# Patient Record
Sex: MALE | Race: BLACK OR AFRICAN AMERICAN | Employment: FULL TIME | URBAN - METROPOLITAN AREA
[De-identification: names, ages, dates, MRNs, and addresses within clinical notes are randomized per-mention and may not be internally consistent; named-entity substitution may affect disease eponyms.]

---

## 2017-03-30 DIAGNOSIS — B00.9 HSV-1 INFECTION: ICD-10-CM

## 2017-03-30 DIAGNOSIS — E55.9 VITAMIN D DEFICIENCY: ICD-10-CM

## 2017-03-30 RX ORDER — VALACYCLOVIR HYDROCHLORIDE 500 MG/1
500 TABLET, FILM COATED ORAL 2 TIMES DAILY
Qty: 30 TAB | Refills: 1 | Status: CANCELLED | OUTPATIENT
Start: 2017-03-30

## 2017-03-30 RX ORDER — ERGOCALCIFEROL 1.25 MG/1
50000 CAPSULE ORAL
Qty: 12 CAP | Refills: 2 | Status: CANCELLED | OUTPATIENT
Start: 2017-03-30

## 2017-03-30 NOTE — TELEPHONE ENCOUNTER
Requested Prescriptions     Pending Prescriptions Disp Refills    ergocalciferol (ERGOCALCIFEROL) 50,000 unit capsule 12 Cap 2     Sig: Take 1 Cap by mouth every seven (7) days.  valACYclovir (VALTREX) 500 mg tablet 30 Tab 1     Sig: Take 1 Tab by mouth two (2) times a day. As needed during outbreaks.    Last Filled 1/27/16  Last office visit was  9/06/2016  Next office visit is     None schedule     Please assist.

## 2017-03-30 NOTE — TELEPHONE ENCOUNTER
Please call and notify pt that no further refills will be granted unless he makes a f/u appointment.

## 2017-06-01 LAB
25(OH)D3 SERPL-MCNC: 26.3 NG/ML (ref 32–100)
A-G RATIO,AGRAT: 1.7 RATIO (ref 1.1–2.6)
ABSOLUTE LYMPHOCYTE COUNT, 10803: 1.8 K/UL (ref 1–4.8)
ALBUMIN SERPL-MCNC: 4.6 G/DL (ref 3.5–5)
ALP SERPL-CCNC: 64 U/L (ref 25–115)
ALT SERPL-CCNC: 35 U/L (ref 5–40)
ANION GAP SERPL CALC-SCNC: 15 MMOL/L
AST SERPL W P-5'-P-CCNC: 21 U/L (ref 10–37)
BASOPHILS # BLD: 0 K/UL (ref 0–0.2)
BASOPHILS NFR BLD: 1 % (ref 0–2)
BILIRUB SERPL-MCNC: 0.2 MG/DL (ref 0.2–1.2)
BUN SERPL-MCNC: 10 MG/DL (ref 6–22)
CALCIUM SERPL-MCNC: 9.5 MG/DL (ref 8.4–10.4)
CHLORIDE SERPL-SCNC: 99 MMOL/L (ref 98–110)
CHOLEST SERPL-MCNC: 170 MG/DL (ref 110–200)
CO2 SERPL-SCNC: 29 MMOL/L (ref 20–32)
CREAT SERPL-MCNC: 1 MG/DL (ref 0.5–1.2)
EOSINOPHIL # BLD: 0.1 K/UL (ref 0–0.5)
EOSINOPHIL NFR BLD: 3 % (ref 0–6)
ERYTHROCYTE [DISTWIDTH] IN BLOOD BY AUTOMATED COUNT: 14.2 % (ref 10–16)
GFRAA, 66117: >60
GFRNA, 66118: >60
GLOBULIN,GLOB: 2.7 G/DL (ref 2–4)
GLUCOSE SERPL-MCNC: 115 MG/DL (ref 65–99)
GRANULOCYTES,GRANS: 45 % (ref 40–75)
HCT VFR BLD AUTO: 44.7 % (ref 36.6–51.9)
HDLC SERPL-MCNC: 47 MG/DL (ref 40–59)
HGB BLD-MCNC: 14.1 G/DL (ref 13.2–17.3)
LDLC SERPL CALC-MCNC: 106 MG/DL (ref 50–99)
LYMPHOCYTES, LYMLT: 46 % (ref 27–45)
MCH RBC QN AUTO: 31 PG (ref 26–34)
MCHC RBC AUTO-ENTMCNC: 32 G/DL (ref 32–36)
MCV RBC AUTO: 97 FL (ref 80–95)
MONOCYTES # BLD: 0.2 K/UL (ref 0.1–0.9)
MONOCYTES NFR BLD: 5 % (ref 3–9)
NEUTROPHILS # BLD AUTO: 1.8 K/UL (ref 1.8–7.7)
PLATELET # BLD AUTO: 216 K/UL (ref 140–440)
PMV BLD AUTO: 9.7 FL (ref 6–10.8)
POTASSIUM SERPL-SCNC: 4.3 MMOL/L (ref 3.5–5.5)
PROT SERPL-MCNC: 7.3 G/DL (ref 6.4–8.3)
RBC # BLD AUTO: 4.61 M/UL (ref 3.8–5.8)
SODIUM SERPL-SCNC: 143 MMOL/L (ref 133–145)
T4 FREE SERPL-MCNC: 1 NG/DL (ref 0.9–1.8)
TRIGL SERPL-MCNC: 83 MG/DL (ref 40–149)
TSH SERPL DL<=0.005 MIU/L-ACNC: 1.88 MCU/ML (ref 0.27–4.2)
VLDLC SERPL CALC-MCNC: 17 MG/DL (ref 8–30)
WBC # BLD AUTO: 3.9 K/UL (ref 4–11)

## 2017-06-05 ENCOUNTER — OFFICE VISIT (OUTPATIENT)
Dept: INTERNAL MEDICINE CLINIC | Age: 32
End: 2017-06-05

## 2017-06-05 VITALS
WEIGHT: 228.6 LBS | SYSTOLIC BLOOD PRESSURE: 110 MMHG | TEMPERATURE: 98.3 F | HEART RATE: 69 BPM | BODY MASS INDEX: 34.65 KG/M2 | HEIGHT: 68 IN | RESPIRATION RATE: 18 BRPM | DIASTOLIC BLOOD PRESSURE: 78 MMHG | OXYGEN SATURATION: 98 %

## 2017-06-05 DIAGNOSIS — E55.9 VITAMIN D DEFICIENCY: ICD-10-CM

## 2017-06-05 DIAGNOSIS — E66.9 OBESITY (BMI 30.0-34.9): ICD-10-CM

## 2017-06-05 DIAGNOSIS — G47.33 OBSTRUCTIVE SLEEP APNEA: ICD-10-CM

## 2017-06-05 DIAGNOSIS — B00.9 HSV-1 INFECTION: ICD-10-CM

## 2017-06-05 DIAGNOSIS — Z79.899 ENCOUNTER FOR LONG-TERM (CURRENT) USE OF MEDICATIONS: ICD-10-CM

## 2017-06-05 DIAGNOSIS — E78.5 DYSLIPIDEMIA: Primary | ICD-10-CM

## 2017-06-05 RX ORDER — ERGOCALCIFEROL 1.25 MG/1
50000 CAPSULE ORAL
Qty: 12 CAP | Refills: 5 | Status: SHIPPED | OUTPATIENT
Start: 2017-06-05 | End: 2017-11-21 | Stop reason: SDUPTHER

## 2017-06-05 RX ORDER — VALACYCLOVIR HYDROCHLORIDE 500 MG/1
500 TABLET, FILM COATED ORAL 2 TIMES DAILY
Qty: 30 TAB | Refills: 1 | Status: SHIPPED | OUTPATIENT
Start: 2017-06-05 | End: 2018-10-23

## 2017-06-05 NOTE — PROGRESS NOTES
Chief Complaint   Patient presents with    Insomnia   Patient is her today for F/U for Insomnia and snoring. Pt sts that he has a CPAP machine and is now sleeping better. Pt also sts that he has right hand pain in the fifth digit finger. 1. Have you been to the ER, urgent care clinic since your last visit? Hospitalized since your last visit? Yes In [facility] Stomach virus. 2. Have you seen or consulted any other health care providers outside of the [address] [place] [address] since your last visit? Include any pap smears or colon screening.  No

## 2017-06-05 NOTE — PATIENT INSTRUCTIONS
Snoring: Care Instructions  Your Care Instructions  Snoring is a noise that you may make while breathing during sleep. You snore when the flow of air from your mouth or nose to your lungs makes the tissues of your throat vibrate while you sleep. This usually is caused by a blockage or narrowing in your nose, mouth, or throat (airway). Snoring can be soft, loud, raspy, harsh, hoarse, or fluttering. Your bed partner may notice that you sleep with your mouth open and that you are restless while sleeping. If snoring interferes with your or your bed partner's sleep, either or both of you may feel tired during the day. You may be able to help reduce your snoring by making changes in your activities and in the way you sleep. Follow-up care is a key part of your treatment and safety. Be sure to make and go to all appointments, and call your doctor if you are having problems. It's also a good idea to know your test results and keep a list of the medicines you take. How can you care for yourself at home? · Lose weight, if needed. Many people who snore are overweight. Weight loss can help reduce the narrowing of the airway and might reduce or stop snoring. · Limit the use of alcohol and medicines. Drinking a lot of alcohol or taking certain medicines, especially sleeping pills or tranquilizers, before sleep may make snoring worse. · Go to bed at the same time each night, and get plenty of sleep. You may snore more when you have not had enough sleep. · Sleep on your side. Sleeping on your side may stop snoring. Try sewing a pocket in the middle of the back of your [employer] top, putting a tennis ball into the pocket, and stitching it closed. This will help keep you from sleeping on your back. · Treat breathing problems. Breathing problems caused by colds or allergies can disturb airflow. This can lead to snoring. · Use a device that helps keep your airway open during sleep.  This could be a device that you put in your mouth. Other examples include strips or disks that you use on your nose. · Do not smoke. Smoking can make snoring worse. If you need help quitting, talk to your doctor about stop-smoking programs and medicines. These can increase your chances of quitting for good. · Raise the head of your bed 4 to 6 inches by putting bricks under the legs of the bed. This may prevent your tongue from falling toward the back of the throat, which can make a blocked or narrow airway worse. Putting pillows under your head will not help. When should you call for help? Watch closely for changes in your health, and be sure to contact your doctor if:  · You snore, and you feel sleepy during the day. · Your sleeping partner or you notice that you gasp, choke, or stop breathing during sleep. · You do not get better as expected. Where can you learn more? Go to http://paulina-cale.info/. Enter V090 in the search box to learn more about \"Snoring: Care Instructions. \"  Current as of: May 23, 2016  Content Version: 11.2  © 5514-7177 [employer]. Care instructions adapted under license by [employer] (which disclaims liability or warranty for this information). If you have questions about a medical condition or this instruction, always ask your healthcare professional. [address] any warranty or liability for your use of this information. Hyperlipidemia: After Your Visit  Your Care Instructions  Hyperlipidemia is too much fat in your blood. The body has several kinds of fat, including cholesterol and triglycerides. Your body needs fat for many things, such as making new cells. But too much fat in your blood increases your chances of having a heart attack or stroke. You may be able to lower your cholesterol and triglycerides with a heart-healthy diet, exercise, and if needed, medicine.  Your doctor may want you to try lifestyle changes first to see whether they lower the fat in your blood. You may need to take medicine if lifestyle changes do not lower the fat in your blood enough. Follow-up care is a key part of your treatment and safety. Be sure to make and go to all appointments, and call your doctor if you are having problems. Its also a good idea to know your test results and keep a list of the medicines you take. How can you care for yourself at home? Take your medicines  · Take your medicines exactly as prescribed. Call your doctor if you think you are having a problem with your medicine. · If you take medicine to lower your cholesterol, go to follow-up visits. You will need to have blood tests. · Do not take large doses of niacin, which is a B vitamin, while taking medicine called statins. It may increase the chance of muscle pain and liver problems. · Talk to your doctor about avoiding grapefruit juice if you are taking statins. Grapefruit juice can raise the level of this medicine in your blood. This could increase side effects. Eat more fruits, vegetables, and fiber  · Fruits and vegetables have lots of nutrients that help protect against heart disease, and they have little--if any--fat. Try to eat at least five servings a day. Dark green, deep orange, or yellow fruits and vegetables are healthy choices. · Keep carrots, celery, and other veggies handy for snacks. Buy fruit that is in season and store it where you can see it so that you will be tempted to eat it. Cook dishes that have a lot of veggies in them, such as stir-fries and soups. · Foods high in fiber may reduce your cholesterol and provide important vitamins and minerals. High-fiber foods include whole-grain cereals and breads, oatmeal, beans, brown rice, citrus fruits, and apples. · Buy whole-grain breads and cereals instead of white bread and pastries. Limit saturated fat  · Read food labels and try to avoid saturated fat and trans fat. They increase your risk of heart disease.   · Use olive or canola oil when you cook. Try cholesterol-lowering spreads, such as Benecol or Take Control. · Bake, broil, grill, or steam foods instead of frying them. · Limit the amount of high-fat meats you eat, including hot dogs and sausages. Cut out all visible fat when you prepare meat. · Eat fish, skinless poultry, and soy products such as tofu instead of high-fat meats. Soybeans may be especially good for your heart. Eat at least two servings of fish a week. Certain fish, such as salmon, contain omega-3 fatty acids, which may help reduce your risk of heart attack. · Choose low-fat or fat-free milk and dairy products. Get exercise, limit alcohol, and quit smoking  · Get more exercise. Work with your doctor to set up an exercise program. Even if you can do only a small amount, exercise will help you get stronger, have more energy, and manage your weight and your stress. Walking is an easy way to get exercise. Gradually increase the amount you walk every day. Aim for at least 30 minutes on most days of the week. You also may want to swim, bike, or do other activities. · Limit alcohol to no more than 2 drinks a day for men and 1 drink a day for women. · Do not smoke. If you need help quitting, talk to your doctor about stop-smoking programs and medicines. These can increase your chances of quitting for good. When should you call for help? Call 911 anytime you think you may need emergency care. For example, call if:  · You have symptoms of a heart attack. These may include:  ¨ Chest pain or pressure, or a strange feeling in the chest.  ¨ Sweating. ¨ Shortness of breath. ¨ Nausea or vomiting. ¨ Pain, pressure, or a strange feeling in the back, neck, jaw, or upper belly or in one or both shoulders or arms. ¨ Lightheadedness or sudden weakness. ¨ A fast or irregular heartbeat. After you call 911, the  may tell you to chew 1 adult-strength or 2 to 4 low-dose aspirin. Wait for an ambulance.  Do not try to drive yourself. · You have signs of a stroke. These may include:  ¨ Sudden numbness, paralysis, or weakness in your face, arm, or leg, especially on only one side of your body. ¨ New problems with walking or balance. ¨ Sudden vision changes. ¨ Drooling or slurred speech. ¨ New problems speaking or understanding simple statements, or feeling confused. ¨ A sudden, severe headache that is different from past headaches. · You passed out (lost consciousness). Call your doctor now or seek immediate medical care if:  · You have muscle pain or weakness. Watch closely for changes in your health, and be sure to contact your doctor if:  · You are very tired. · You have an upset stomach, gas, constipation, or belly pain or cramps. Where can you learn more? Go to [employer].be  Enter C406 in the search box to learn more about \"Hyperlipidemia: After Your Visit. \"   © 5968-0751 [employer]. Care instructions adapted under license by [employer] (which disclaims liability or warranty for this information). This care instruction is for use with your licensed healthcare professional. If you have questions about a medical condition or this instruction, always ask your healthcare professional. [place] [address] any warranty or liability for your use of this information. Content Version: 2.2.487059; Last Revised: October 13, 2011                 Starting a Weight Loss Plan: Care Instructions  Your Care Instructions  If you are thinking about losing weight, it can be hard to know where to start. Your doctor can help you set up a weight loss plan that best meets your needs. You may want to take a class on nutrition or exercise, or join a weight loss support group. If you have questions about how to make changes to your eating or exercise habits, ask your doctor about seeing a registered dietitian or an exercise specialist.  It can be a big challenge to lose weight.  But you do not have to make huge changes at once. Make small changes, and stick with them. When those changes become habit, add a few more changes. If you do not think you are ready to make changes right now, try to pick a date in the future. Make an appointment to see your doctor to discuss whether the time is right for you to start a plan. Follow-up care is a key part of your treatment and safety. Be sure to make and go to all appointments, and call your doctor if you are having problems. Its also a good idea to know your test results and keep a list of the medicines you take. How can you care for yourself at home? · Set realistic goals. Many people expect to lose much more weight than is likely. A weight loss of 5% to 10% of your body weight may be enough to improve your health. · Get family and friends involved to provide support. Talk to them about why you are trying to lose weight, and ask them to help. They can help by participating in exercise and having meals with you, even if they may be eating something different. · Find what works best for you. If you do not have time or do not like to cook, a program that offers meal replacement bars or shakes may be better for you. Or if you like to prepare meals, finding a plan that includes daily menus and recipes may be best.  · Ask your doctor about other health professionals who can help you achieve your weight loss goals. ¨ A dietitian can help you make healthy changes in your diet. ¨ An exercise specialist or  can help you develop a safe and effective exercise program.  ¨ A counselor or psychiatrist can help you cope with issues such as depression, anxiety, or family problems that can make it hard to focus on weight loss. · Consider joining a support group for people who are trying to lose weight. Your doctor can suggest groups in your area. Where can you learn more? Go to http://paulina-cale.info/.   Enter M379 in the search box to learn more about \"Starting a Weight Loss Plan: Care Instructions. \"  Current as of: October 13, 2016  Content Version: 11.2  © 3139-8935 [employer]. Care instructions adapted under license by [employer] (which disclaims liability or warranty for this information). If you have questions about a medical condition or this instruction, always ask your healthcare professional. [place] [address] any warranty or liability for your use of this information.

## 2017-06-05 NOTE — PROGRESS NOTES
Chief Complaint   Patient presents with    F/U Vit D Def and Dyslipidemia         HPI:  Patient is a 36year old  male with medical problems listed below presents today for follow up of Dyslipidemia and Vit D def. He has been feeling well and voices no complaints today. He is complaint with his medications with no adverse effects reported. He has obstructive sleep apnea and endorsed that sleep has been improved with CPAP use when sleeping. He has been eating more and not exercising and has gained 14 pounds in the last year. He is requesting refill of Valtrex that he takes for Herpetic infection. Past Medical History:   Diagnosis Date    Condyloma acuminatum     Environmental allergies     Groin pain     Plantar fasciitis     Tinea pedis        Allergies   Allergen Reactions    Penicillins Rash, Swelling and Hives    Mold Extracts Rash    Peanut Rash and Swelling       Current Outpatient Prescriptions   Medication Sig Dispense Refill    traZODone (DESYREL) 50 mg tablet Take 1 Tab by mouth nightly. 30 Tab 2    naproxen (NAPROSYN) 500 mg tablet Take 1 Tab by mouth two (2) times daily (with meals). 28 Tab 1    ergocalciferol (ERGOCALCIFEROL) 50,000 unit capsule Take 1 Cap by mouth every seven (7) days. 12 Cap 2    valACYclovir (VALTREX) 500 mg tablet Take 1 Tab by mouth two (2) times a day. As needed during outbreaks. 30 Tab 1    clotrimazole (LOTRIMIN) 1 % topical cream Apply  to affected area two (2) times a day. 15 g 3    diphenhydrAMINE (BENADRYL) 25 mg capsule Take 25 mg by mouth every six (6) hours as needed.  Indications: INSOMNIA            ROS:  Constitutional: Negative for fever, chills, or fatigue  Neurological: Negative for headache, dizziness, visual disturbance, or loss of consciousness  Respiratory: Negative for SOB, hemoptysis, or wheezing  Cardiovascular: Negative for chest pain, palpitation, or leg swelling  Gastrointestinal: Negative for abdominal pain, nausea, vomiting, diarrhea, blood in stool, melena, or heartburn  Musculoskeletal: Negative for falls        Physical Exam:  Visit Vitals    /78 (BP 1 Location: Left arm, BP Patient Position: Sitting)    Pulse 69    Temp 98.3 °F (36.8 °C) (Oral)    Resp 18    Ht 5' 8\" (1.727 m)    Wt 228 lb 9.6 oz (103.7 kg)    SpO2 98%    BMI 34.76 kg/m2     General: a & o x 3, afebrile, well-nourished, interacting appropriately, in no acute distress  Skin: warm and dry, no rashes , no bruises  Neck: supple, symmetrical, no thyromegaly  Respiratory: symmetrical chest expansion, lung sounds clear bilaterally, good air entry, good respiratory effort, no wheezes or crackles  Cardiovascular: normal S1S2, regular rate and rhythm, no murmurs, pulses palpable, no thrill, no carotid or abdominal bruits, no peripheral edema, no JVD  Abdomen: non-distended, normoactive bowel sounds x 4 quadrants, soft, non-tender to palpation  Musculoskeletal: normal ROM on all joints, no swelling or deformity, no perilumbar tenderness, steady gait      Assessment/Plan:    ICD-10-CM ICD-9-CM    1. Dyslipidemia E78.5 272.4 Recent lipid panel done 5/31/17 reviewed with pt and revealed Cho 170 and  - he was counseled on low fat diet. LIPID PANEL   2. Vitamin D deficiency E55.9 268.9 Recent Vit D is low at 26.3  ergocalciferol (ERGOCALCIFEROL) 50,000 unit capsule Q week sent to pharmacy. VITAMIN D, 25 HYDROXY   3. Obesity (BMI 30.0-34. 9) E66.9 278.00 I have reviewed/discussed the above normal BMI with the patient. I have recommended the following interventions: dietary management education, guidance, and counseling, encourage exercise and monitor weight . .     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 Encounter for long-term (current) use of medications Z79.899 V58.69 CBC WITH AUTOMATED DIFF      HEMOGLOBIN A1C W/O EAG      METABOLIC PANEL, COMPREHENSIVE      T4, FREE      TSH 3RD GENERATION   6.  Obstructive sleep apnea G47.33 327.23 He was advised to continue use of CPAP when sleeping. Orders Placed This Encounter    CBC WITH AUTOMATED DIFF     Standing Status:   Future     Standing Expiration Date:   1/1/2018    HEMOGLOBIN A1C W/O EAG     Standing Status:   Future     Standing Expiration Date:   6/6/2018    LIPID PANEL     Standing Status:   Future     Standing Expiration Date:   5/7/5087    METABOLIC PANEL, COMPREHENSIVE     Standing Status:   Future     Standing Expiration Date:   1/1/2018    T4, FREE     Standing Status:   Future     Standing Expiration Date:   1/1/2018    TSH 3RD GENERATION     Standing Status:   Future     Standing Expiration Date:   1/1/2018    VITAMIN D, 25 HYDROXY     Standing Status:   Future     Standing Expiration Date:   12/8/2017    valACYclovir (VALTREX) 500 mg tablet     Sig: Take 1 Tab by mouth two (2) times a day. As needed during outbreaks. Dispense:  30 Tab     Refill:  1    ergocalciferol (ERGOCALCIFEROL) 50,000 unit capsule     Sig: Take 1 Cap by mouth every seven (7) days. Dispense:  12 Cap     Refill:  5         Recent labs reviewed with pt      Additional Notes: Discussed today's diagnosis, treatment plans. Discussed medication indications and side effects. After Visit Summary: Discussed provided printed patient instructions. Answered questions accordingly. Follow-up Disposition:  In 6 months with labs 1 week prior        Angela Strong DO, MPH  Internal Medicine

## 2017-06-05 NOTE — MR AVS SNAPSHOT
Visit Information Date & Time Provider Department Dept. Phone Encounter #  
 6/5/2017  7:45  Nisa Strong DO [facility]ists at [facility]  Follow-up Instructions Return in about 6 months (around 12/5/2017) for Labs 1 week before. Upcoming Health Maintenance Date Due DTaP/Tdap/Td series (1 - Tdap) 9/2/2014 INFLUENZA AGE 9 TO ADULT 8/1/2017 Allergies as of 6/5/2017  Review Complete On: 6/5/2017 By: Fritz Wilks LPN Severity Noted Reaction Type Reactions Penicillins High 02/13/2017    Rash, Swelling, Hives Mold Extracts  01/16/2015    Rash Peanut  01/16/2015    Rash, Swelling Current Immunizations  Never Reviewed Name Date Influenza Vaccine 9/1/2015 Td 9/1/2014 Not reviewed this visit You Were Diagnosed With   
  
 Codes Comments Obesity (BMI 30.0-34.9)    -  Primary ICD-10-CM: W45.2 ICD-9-CM: 278.00 HSV-1 infection     ICD-10-CM: B00.9 ICD-9-CM: 054.9 Vitamin D deficiency     ICD-10-CM: E55.9 ICD-9-CM: 268.9 Dyslipidemia     ICD-10-CM: E78.5 ICD-9-CM: 272.4 Encounter for long-term (current) use of medications     ICD-10-CM: Z79.899 ICD-9-CM: V58.69 Vitals BP Pulse Temp Resp Height(growth percentile) Weight(growth percentile) 110/78 (BP 1 Location: Left arm, BP Patient Position: Sitting) 69 98.3 °F (36.8 °C) (Oral) 18 5' 8\" (1.727 m) 228 lb 9.6 oz (103.7 kg) SpO2 BMI Smoking Status 98% 34.76 kg/m2 Never Smoker Vitals History BMI and BSA Data Body Mass Index Body Surface Area 34.76 kg/m 2 2.23 m 2 Preferred Pharmacy Pharmacy Name Phone [id number] [phone] Your Updated Medication List  
  
   
This list is accurate as of: 6/5/17  8:21 AM.  Always use your most recent med list.  
  
  
  
  
 clotrimazole 1 % topical cream  
Commonly known as:  Carmita Vicente  
 Apply  to affected area two (2) times a day. diphenhydrAMINE 25 mg capsule Commonly known as:  BENADRYL Take 25 mg by mouth every six (6) hours as needed. Indications: INSOMNIA  
  
 ergocalciferol 50,000 unit capsule Commonly known as:  ERGOCALCIFEROL Take 1 Cap by mouth every seven (7) days. naproxen 500 mg tablet Commonly known as:  NAPROSYN Take 1 Tab by mouth two (2) times daily (with meals). traZODone 50 mg tablet Commonly known as:  Mordecai Amado Take 1 Tab by mouth nightly. valACYclovir 500 mg tablet Commonly known as:  VALTREX Take 1 Tab by mouth two (2) times a day. As needed during outbreaks. Prescriptions Sent to Pharmacy Refills  
 valACYclovir (VALTREX) 500 mg tablet 1 Sig: Take 1 Tab by mouth two (2) times a day. As needed during outbreaks. Class: Normal  
 Pharmacy: [address] [place] [address] Ph #: [phone] Route: Oral  
 ergocalciferol (ERGOCALCIFEROL) 50,000 unit capsule 5 Sig: Take 1 Cap by mouth every seven (7) days. Class: Normal  
 Pharmacy: [address] [place] [address] Ph #: [phone] Route: Oral  
  
Follow-up Instructions Return in about 6 months (around 12/5/2017) for Labs 1 week before. To-Do List   
 12/02/2017 Lab:  CBC WITH AUTOMATED DIFF   
  
 12/02/2017 Lab:  HEMOGLOBIN A1C W/O EAG   
  
 12/02/2017 Lab:  LIPID PANEL   
  
 12/02/2017 Lab:  METABOLIC PANEL, COMPREHENSIVE   
  
 12/02/2017 Lab:  T4, FREE   
  
 12/02/2017 Lab:  TSH 3RD GENERATION   
  
 12/05/2017 Lab:  VITAMIN D, 25 HYDROXY Patient Instructions Snoring: Care Instructions Your Care Instructions Snoring is a noise that you may make while breathing during sleep. You snore when the flow of air from your mouth or nose to your lungs makes the tissues of your throat vibrate while you sleep.  This usually is caused by a blockage or narrowing in your nose, mouth, or throat (airway). Snoring can be soft, loud, raspy, harsh, hoarse, or fluttering. Your bed partner may notice that you sleep with your mouth open and that you are restless while sleeping. If snoring interferes with your or your bed partner's sleep, either or both of you may feel tired during the day. You may be able to help reduce your snoring by making changes in your activities and in the way you sleep. Follow-up care is a key part of your treatment and safety. Be sure to make and go to all appointments, and call your doctor if you are having problems. It's also a good idea to know your test results and keep a list of the medicines you take. How can you care for yourself at home? · Lose weight, if needed. Many people who snore are overweight. Weight loss can help reduce the narrowing of the airway and might reduce or stop snoring. · Limit the use of alcohol and medicines. Drinking a lot of alcohol or taking certain medicines, especially sleeping pills or tranquilizers, before sleep may make snoring worse. · Go to bed at the same time each night, and get plenty of sleep. You may snore more when you have not had enough sleep. · Sleep on your side. Sleeping on your side may stop snoring. Try sewing a pocket in the middle of the back of your pajama top, putting a tennis ball into the pocket, and stitching it closed. This will help keep you from sleeping on your back. · Treat breathing problems. Breathing problems caused by colds or allergies can disturb airflow. This can lead to snoring. · Use a device that helps keep your airway open during sleep. This could be a device that you put in your mouth. Other examples include strips or disks that you use on your nose. · Do not smoke. Smoking can make snoring worse. If you need help quitting, talk to your doctor about stop-smoking programs and medicines. These can increase your chances of quitting for good. · Raise the head of your bed 4 to 6 inches by putting bricks under the legs of the bed. This may prevent your tongue from falling toward the back of the throat, which can make a blocked or narrow airway worse. Putting pillows under your head will not help. When should you call for help? Watch closely for changes in your health, and be sure to contact your doctor if: 
· You snore, and you feel sleepy during the day. · Your sleeping partner or you notice that you gasp, choke, or stop breathing during sleep. · You do not get better as expected. Where can you learn more? Go to http://paulina-cale.info/. Enter J570 in the search box to learn more about \"Snoring: Care Instructions. \" Current as of: May 23, 2016 Content Version: 11.2 © 9664-4138 [employer]. Care instructions adapted under license by [employer] (which disclaims liability or warranty for this information). If you have questions about a medical condition or this instruction, always ask your healthcare professional. [place] [address] any warranty or liability for your use of this information. Hyperlipidemia: After Your Visit Your Care Instructions Hyperlipidemia is too much fat in your blood. The body has several kinds of fat, including cholesterol and triglycerides. Your body needs fat for many things, such as making new cells. But too much fat in your blood increases your chances of having a heart attack or stroke. You may be able to lower your cholesterol and triglycerides with a heart-healthy diet, exercise, and if needed, medicine. Your doctor may want you to try lifestyle changes first to see whether they lower the fat in your blood. You may need to take medicine if lifestyle changes do not lower the fat in your blood enough. Follow-up care is a key part of your treatment and safety.  Be sure to make and go to all appointments, and call your doctor if you are having problems. Its also a good idea to know your test results and keep a list of the medicines you take. How can you care for yourself at home? Take your medicines · Take your medicines exactly as prescribed. Call your doctor if you think you are having a problem with your medicine. · If you take medicine to lower your cholesterol, go to follow-up visits. You will need to have blood tests. · Do not take large doses of niacin, which is a B vitamin, while taking medicine called statins. It may increase the chance of muscle pain and liver problems. · Talk to your doctor about avoiding grapefruit juice if you are taking statins. Grapefruit juice can raise the level of this medicine in your blood. This could increase side effects. Eat more fruits, vegetables, and fiber · Fruits and vegetables have lots of nutrients that help protect against heart disease, and they have littleif anyfat. Try to eat at least five servings a day. Dark green, deep orange, or yellow fruits and vegetables are healthy choices. · Keep carrots, celery, and other veggies handy for snacks. Buy fruit that is in season and store it where you can see it so that you will be tempted to eat it. Cook dishes that have a lot of veggies in them, such as stir-fries and soups. · Foods high in fiber may reduce your cholesterol and provide important vitamins and minerals. High-fiber foods include whole-grain cereals and breads, oatmeal, beans, brown rice, citrus fruits, and apples. · Buy whole-grain breads and cereals instead of white bread and pastries. Limit saturated fat · Read food labels and try to avoid saturated fat and trans fat. They increase your risk of heart disease. · Use olive or canola oil when you cook. Try cholesterol-lowering spreads, such as Benecol or Take Control. · Bake, broil, grill, or steam foods instead of frying them.  
· Limit the amount of high-fat meats you eat, including hot dogs and sausages. Cut out all visible fat when you prepare meat. · Eat fish, skinless poultry, and soy products such as tofu instead of high-fat meats. Soybeans may be especially good for your heart. Eat at least two servings of fish a week. Certain fish, such as salmon, contain omega-3 fatty acids, which may help reduce your risk of heart attack. · Choose low-fat or fat-free milk and dairy products. Get exercise, limit alcohol, and quit smoking · Get more exercise. Work with your doctor to set up an exercise program. Even if you can do only a small amount, exercise will help you get stronger, have more energy, and manage your weight and your stress. Walking is an easy way to get exercise. Gradually increase the amount you walk every day. Aim for at least 30 minutes on most days of the week. You also may want to swim, bike, or do other activities. · Limit alcohol to no more than 2 drinks a day for men and 1 drink a day for women. · Do not smoke. If you need help quitting, talk to your doctor about stop-smoking programs and medicines. These can increase your chances of quitting for good. When should you call for help? Call 911 anytime you think you may need emergency care. For example, call if: 
· You have symptoms of a heart attack. These may include: ¨ Chest pain or pressure, or a strange feeling in the chest. 
¨ Sweating. ¨ Shortness of breath. ¨ Nausea or vomiting. ¨ Pain, pressure, or a strange feeling in the back, neck, jaw, or upper belly or in one or both shoulders or arms. ¨ Lightheadedness or sudden weakness. ¨ A fast or irregular heartbeat. After you call 911, the  may tell you to chew 1 adult-strength or 2 to 4 low-dose aspirin. Wait for an ambulance. Do not try to drive yourself. · You have signs of a stroke. These may include: 
¨ Sudden numbness, paralysis, or weakness in your face, arm, or leg, especially on only one side of your body. ¨ New problems with walking or balance. ¨ Sudden vision changes. ¨ Drooling or slurred speech. ¨ New problems speaking or understanding simple statements, or feeling confused. ¨ A sudden, severe headache that is different from past headaches. · You passed out (lost consciousness). Call your doctor now or seek immediate medical care if: 
· You have muscle pain or weakness. Watch closely for changes in your health, and be sure to contact your doctor if: 
· You are very tired. · You have an upset stomach, gas, constipation, or belly pain or cramps. Where can you learn more? Go to Level[employer]be Enter C406 in the search box to learn more about \"Hyperlipidemia: After Your Visit. \"  
© 7330-5790 [employer]. Care instructions adapted under license by [employer] (which disclaims liability or warranty for this information). This care instruction is for use with your licensed healthcare professional. If you have questions about a medical condition or this instruction, always ask your healthcare professional. [place] [address] any warranty or liability for your use of this information. Content Version: 1.2.288544; Last Revised: October 13, 2011 Starting a Weight Loss Plan: Care Instructions Your Care Instructions If you are thinking about losing weight, it can be hard to know where to start. Your doctor can help you set up a weight loss plan that best meets your needs. You may want to take a class on nutrition or exercise, or join a weight loss support group. If you have questions about how to make changes to your eating or exercise habits, ask your doctor about seeing a registered dietitian or an exercise specialist. 
It can be a big challenge to lose weight. But you do not have to make huge changes at once. Make small changes, and stick with them. When those changes become habit, add a few more changes.  
If you do not think you are ready to make changes right now, try to pick a date in the future. Make an appointment to see your doctor to discuss whether the time is right for you to start a plan. Follow-up care is a key part of your treatment and safety. Be sure to make and go to all appointments, and call your doctor if you are having problems. Its also a good idea to know your test results and keep a list of the medicines you take. How can you care for yourself at home? · Set realistic goals. Many people expect to lose much more weight than is likely. A weight loss of 5% to 10% of your body weight may be enough to improve your health. · Get family and friends involved to provide support. Talk to them about why you are trying to lose weight, and ask them to help. They can help by participating in exercise and having meals with you, even if they may be eating something different. · Find what works best for you. If you do not have time or do not like to cook, a program that offers meal replacement bars or shakes may be better for you. Or if you like to prepare meals, finding a plan that includes daily menus and recipes may be best. 
· Ask your doctor about other health professionals who can help you achieve your weight loss goals. ¨ A dietitian can help you make healthy changes in your diet. ¨ An exercise specialist or  can help you develop a safe and effective exercise program. 
¨ A counselor or psychiatrist can help you cope with issues such as depression, anxiety, or family problems that can make it hard to focus on weight loss. · Consider joining a support group for people who are trying to lose weight. Your doctor can suggest groups in your area. Where can you learn more? Go to http://paulina-cale.info/. Enter I176 in the search box to learn more about \"Starting a Weight Loss Plan: Care Instructions. \" Current as of: October 13, 2016 Content Version: 11.2 © 5286-0366 [employer].  Care instructions adapted under license by [address] (which disclaims liability or warranty for this information). If you have questions about a medical condition or this instruction, always ask your healthcare professional. [address] any warranty or liability for your use of this information. Introducing [facility]! Juan Baltazar introduces [employer] patient portal. Now you can access parts of your medical record, email your doctor's office, and request medication refills online. 1. In your internet browser, go to https://[employer]. [employer] 2. Click on the First Time User? Click Here link in the Sign In box. You will see the New Member Sign Up page. 3. Enter your [employer] Access Code exactly as it appears below. You will not need to use this code after youve completed the sign-up process. If you do not sign up before the expiration date, you must request a new code. · [employer] Access Code: [email or web address] Expires: 9/3/2017  8:20 AM 
 
4. Enter the last four digits of your Social Security Number (xxxx) and Date of Birth (mm/dd/yyyy) as indicated and click Submit. You will be taken to the next sign-up page. 5. Create a [employer]pe ID. This will be your [employer] login ID and cannot be changed, so think of one that is secure and easy to remember. 6. Create a [employer] password. You can change your password at any time. 7. Enter your Password Reset Question and Answer. This can be used at a later time if you forget your password. 8. Enter your e-mail address. You will receive e-mail notification when new information is available in [address] 9. Click Sign Up. You can now view and download portions of your medical record. 10. Click the Download Summary menu link to download a portable copy of your medical information. If you have questions, please visit the Frequently Asked Questions section of the [employer]pe website.  Remember, [employer] is NOT to be used for urgent needs. For medical emergencies, dial 911. Now available from your iPhone and Android! Please provide this summary of care documentation to your next provider. Your primary care clinician is listed as Florin Corrales. If you have any questions after today's visit, please call [phone].

## 2017-11-21 DIAGNOSIS — E55.9 VITAMIN D DEFICIENCY: ICD-10-CM

## 2017-11-22 RX ORDER — ERGOCALCIFEROL 1.25 MG/1
50000 CAPSULE ORAL
Qty: 12 CAP | Refills: 3 | Status: SHIPPED | OUTPATIENT
Start: 2017-11-22 | End: 2019-01-07 | Stop reason: SDUPTHER

## 2017-12-02 DIAGNOSIS — E78.5 DYSLIPIDEMIA: ICD-10-CM

## 2017-12-02 DIAGNOSIS — Z79.899 ENCOUNTER FOR LONG-TERM (CURRENT) USE OF MEDICATIONS: ICD-10-CM

## 2017-12-05 DIAGNOSIS — E55.9 VITAMIN D DEFICIENCY: ICD-10-CM

## 2018-08-15 ENCOUNTER — OFFICE VISIT (OUTPATIENT)
Dept: FAMILY MEDICINE CLINIC | Age: 33
End: 2018-08-15

## 2018-08-15 VITALS
BODY MASS INDEX: 36.43 KG/M2 | RESPIRATION RATE: 18 BRPM | OXYGEN SATURATION: 97 % | TEMPERATURE: 98.5 F | HEART RATE: 79 BPM | WEIGHT: 240.4 LBS | SYSTOLIC BLOOD PRESSURE: 121 MMHG | DIASTOLIC BLOOD PRESSURE: 77 MMHG | HEIGHT: 68 IN

## 2018-08-15 DIAGNOSIS — R51.9 HEADACHE, UNSPECIFIED HEADACHE TYPE: ICD-10-CM

## 2018-08-15 DIAGNOSIS — R11.0 NAUSEA: Primary | ICD-10-CM

## 2018-08-15 DIAGNOSIS — E78.5 DYSLIPIDEMIA: ICD-10-CM

## 2018-08-15 DIAGNOSIS — Z79.899 ENCOUNTER FOR LONG-TERM (CURRENT) USE OF MEDICATIONS: ICD-10-CM

## 2018-08-15 DIAGNOSIS — E55.9 VITAMIN D DEFICIENCY: ICD-10-CM

## 2018-08-15 DIAGNOSIS — E66.9 OBESITY (BMI 30.0-34.9): ICD-10-CM

## 2018-08-15 RX ORDER — BUTALBITAL, ACETAMINOPHEN, CAFFEINE AND CODEINE PHOSPHATE 50; 325; 40; 30 MG/1; MG/1; MG/1; MG/1
1 CAPSULE ORAL
Qty: 30 CAP | Refills: 0 | Status: SHIPPED | OUTPATIENT
Start: 2018-08-15 | End: 2018-10-23

## 2018-08-15 RX ORDER — ONDANSETRON 4 MG/1
4 TABLET, ORALLY DISINTEGRATING ORAL
Qty: 30 TAB | Refills: 0 | Status: SHIPPED | OUTPATIENT
Start: 2018-08-15 | End: 2018-10-23

## 2018-08-15 NOTE — PATIENT INSTRUCTIONS
Learning About the [facility] Diet  What is the Mediterranean diet? The Mediterranean diet is a style of eating rather than a diet plan. It features foods eaten in [place], [place], [place], and other countries along the [facility]. It emphasizes eating foods like fish, fruits, vegetables, beans, high-fiber breads and whole grains, nuts, and olive oil. This style of eating includes limited red meat, cheese, and sweets. Why choose the Mediterranean diet? A Mediterranean-style diet may improve heart health. It contains more fat than other heart-healthy diets. But the fats are mainly from nuts, unsaturated oils (such as fish oils and olive oil), and certain nut or seed oils (such as canola, soybean, or flaxseed oil). These fats may help protect the heart and blood vessels. How can you get started on the Mediterranean diet? Here are some things you can do to switch to a more Mediterranean way of eating. What to eat  · Eat a variety of fruits and vegetables each day, such as grapes, blueberries, tomatoes, broccoli, peppers, figs, olives, spinach, eggplant, beans, lentils, and chickpeas. · Eat a variety of whole-grain foods each day, such as oats, brown rice, and whole wheat bread, pasta, and couscous. · Eat fish at least 2 times a week. Try tuna, salmon, mackerel, lake trout, herring, or sardines. · Eat moderate amounts of low-fat dairy products, such as milk, cheese, or yogurt. · Eat moderate amounts of poultry and eggs. · Choose healthy (unsaturated) fats, such as nuts, olive oil, and certain nut or seed oils like canola, soybean, and flaxseed. · Limit unhealthy (saturated) fats, such as butter, palm oil, and coconut oil. And limit fats found in animal products, such as meat and dairy products made with whole milk. Try to eat red meat only a few times a month in very small amounts. · Limit sweets and desserts to only a few times a week.  This includes sugar-sweetened drinks like soda. The Mediterranean diet may also include red wine with your meal-1 glass each day for women and up to 2 glasses a day for men. Tips for eating at home  · Use herbs, spices, garlic, lemon zest, and citrus juice instead of salt to add flavor to foods. · Add avocado slices to your sandwich instead of barnett. · Have fish for lunch or dinner instead of red meat. Brush the fish with olive oil, and broil or grill it. · Sprinkle your salad with seeds or nuts instead of cheese. · Cook with olive or canola oil instead of butter or oils that are high in saturated fat. · Switch from 2% milk or whole milk to 1% or fat-free milk. · Dip raw vegetables in a vinaigrette dressing or hummus instead of dips made from mayonnaise or sour cream.  · Have a piece of fruit for dessert instead of a piece of cake. Try baked apples, or have some dried fruit. Tips for eating out  · Try broiled, grilled, baked, or poached fish instead of having it fried or breaded. · Ask your  to have your meals prepared with olive oil instead of butter. · Order dishes made with marinara sauce or sauces made from olive oil. Avoid sauces made from cream or mayonnaise. · Choose whole-grain breads, whole wheat pasta and pizza crust, brown rice, beans, and lentils. · Cut back on butter or margarine on bread. Instead, you can dip your bread in a small amount of olive oil. · Ask for a side salad or grilled vegetables instead of french fries or chips. Where can you learn more? Go to http://paulina-cale.info/. Enter [phone] in the search box to learn more about \"Learning About the Mediterranean Diet. \"  Current as of: May 12, 2017  Content Version: 11.7  © 2048-1462 [employer]. Care instructions adapted under license by [employer] (which disclaims liability or warranty for this information).  If you have questions about a medical condition or this instruction, always ask your healthcare professional. [employer], [facility] disclaims any warranty or liability for your use of this information. Hyperlipidemia: After Your Visit  Your Care Instructions  Hyperlipidemia is too much fat in your blood. The body has several kinds of fat, including cholesterol and triglycerides. Your body needs fat for many things, such as making new cells. But too much fat in your blood increases your chances of having a heart attack or stroke. You may be able to lower your cholesterol and triglycerides with a heart-healthy diet, exercise, and if needed, medicine. Your doctor may want you to try lifestyle changes first to see whether they lower the fat in your blood. You may need to take medicine if lifestyle changes do not lower the fat in your blood enough. Follow-up care is a key part of your treatment and safety. Be sure to make and go to all appointments, and call your doctor if you are having problems. Its also a good idea to know your test results and keep a list of the medicines you take. How can you care for yourself at home? Take your medicines  · Take your medicines exactly as prescribed. Call your doctor if you think you are having a problem with your medicine. · If you take medicine to lower your cholesterol, go to follow-up visits. You will need to have blood tests. · Do not take large doses of niacin, which is a B vitamin, while taking medicine called statins. It may increase the chance of muscle pain and liver problems. · Talk to your doctor about avoiding grapefruit juice if you are taking statins. Grapefruit juice can raise the level of this medicine in your blood. This could increase side effects. Eat more fruits, vegetables, and fiber  · Fruits and vegetables have lots of nutrients that help protect against heart disease, and they have littleif anyfat. Try to eat at least five servings a day. Dark green, deep orange, or yellow fruits and vegetables are healthy choices.   · Keep carrots, celery, and other veggies handy for snacks. Buy fruit that is in season and store it where you can see it so that you will be tempted to eat it. Cook dishes that have a lot of veggies in them, such as stir-fries and soups. · Foods high in fiber may reduce your cholesterol and provide important vitamins and minerals. High-fiber foods include whole-grain cereals and breads, oatmeal, beans, brown rice, citrus fruits, and apples. · Buy whole-grain breads and cereals instead of white bread and pastries. Limit saturated fat  · Read food labels and try to avoid saturated fat and trans fat. They increase your risk of heart disease. · Use olive or canola oil when you cook. Try cholesterol-lowering spreads, such as Benecol or Take Control. · Bake, broil, grill, or steam foods instead of frying them. · Limit the amount of high-fat meats you eat, including hot dogs and sausages. Cut out all visible fat when you prepare meat. · Eat fish, skinless poultry, and soy products such as tofu instead of high-fat meats. Soybeans may be especially good for your heart. Eat at least two servings of fish a week. Certain fish, such as salmon, contain omega-3 fatty acids, which may help reduce your risk of heart attack. · Choose low-fat or fat-free milk and dairy products. Get exercise, limit alcohol, and quit smoking  · Get more exercise. Work with your doctor to set up an exercise program. Even if you can do only a small amount, exercise will help you get stronger, have more energy, and manage your weight and your stress. Walking is an easy way to get exercise. Gradually increase the amount you walk every day. Aim for at least 30 minutes on most days of the week. You also may want to swim, bike, or do other activities. · Limit alcohol to no more than 2 drinks a day for men and 1 drink a day for women. · Do not smoke. If you need help quitting, talk to your doctor about stop-smoking programs and medicines.  These can increase your chances of quitting for good. When should you call for help? Call 911 anytime you think you may need emergency care. For example, call if:  · You have symptoms of a heart attack. These may include:  ¨ Chest pain or pressure, or a strange feeling in the chest.  ¨ Sweating. ¨ Shortness of breath. ¨ Nausea or vomiting. ¨ Pain, pressure, or a strange feeling in the back, neck, jaw, or upper belly or in one or both shoulders or arms. ¨ Lightheadedness or sudden weakness. ¨ A fast or irregular heartbeat. After you call 911, the  may tell you to chew 1 adult-strength or 2 to 4 low-dose aspirin. Wait for an ambulance. Do not try to drive yourself. · You have signs of a stroke. These may include:  ¨ Sudden numbness, paralysis, or weakness in your face, arm, or leg, especially on only one side of your body. ¨ New problems with walking or balance. ¨ Sudden vision changes. ¨ Drooling or slurred speech. ¨ New problems speaking or understanding simple statements, or feeling confused. ¨ A sudden, severe headache that is different from past headaches. · You passed out (lost consciousness). Call your doctor now or seek immediate medical care if:  · You have muscle pain or weakness. Watch closely for changes in your health, and be sure to contact your doctor if:  · You are very tired. · You have an upset stomach, gas, constipation, or belly pain or cramps. Where can you learn more? Go to Chairish.be  Enter C406 in the search box to learn more about \"Hyperlipidemia: After Your Visit. \"   © 4186-9147 [employer]. Care instructions adapted under license by [employer] (which disclaims liability or warranty for this information).  This care instruction is for use with your licensed healthcare professional. If you have questions about a medical condition or this instruction, always ask your healthcare professional. [place] [address] any warranty or liability for your use of this information.   Content Version: 0.1.218482; Last Revised: October 13, 2011

## 2018-08-15 NOTE — MR AVS SNAPSHOT
[facility] 
 
 
 [address], [place] [address] Berta Cornelius [address] 
[phone] Patient: Micheal Vail MRN: [id number] DYN:[id number] Visit Information Date & Time Provider Department Dept. Phone Encounter #  
 8/15/2018 10:30  [address] [phone] [id number] Follow-up Instructions Return in about 1 week (around 8/22/2018) for to review labs. Upcoming Health Maintenance Date Due DTaP/Tdap/Td series (1 - Tdap) 9/2/2014 Influenza Age 5 to Adult 8/1/2018 Allergies as of 8/15/2018  Review Complete On: 8/15/2018 By: Stanislaw Cruz Severity Noted Reaction Type Reactions Penicillins High 02/13/2017    Rash, Swelling, Hives Mold Extracts  01/16/2015    Rash Peanut  01/16/2015    Rash, Swelling Current Immunizations  Never Reviewed Name Date Influenza Vaccine 9/1/2015 Td 9/1/2014 Not reviewed this visit You Were Diagnosed With   
  
 Codes Comments Nausea    -  Primary ICD-10-CM: R11.0 ICD-9-CM: 787.02 Vitamin D deficiency     ICD-10-CM: E55.9 ICD-9-CM: 268.9 Dyslipidemia     ICD-10-CM: E78.5 ICD-9-CM: 272.4 Obesity (BMI 30.0-34.9)     ICD-10-CM: U31.6 ICD-9-CM: 278.00 Headache, unspecified headache type     ICD-10-CM: R51 ICD-9-CM: 784.0 Encounter for long-term (current) use of medications     ICD-10-CM: Z79.899 ICD-9-CM: V58.69 Vitals BP Pulse Temp Resp Height(growth percentile) Weight(growth percentile) 121/77 79 98.5 °F (36.9 °C) (Oral) 18 5' 8\" (1.727 m) 240 lb 6.4 oz (109 kg) SpO2 BMI Smoking Status 97% 36.55 kg/m2 Never Smoker BMI and BSA Data Body Mass Index Body Surface Area  
 36.55 kg/m 2 2.29 m 2 Preferred Pharmacy Pharmacy Name Phone [phone] [facility] [place] [address] [phone] Your Updated Medication List  
  
   
 This list is accurate as of 8/15/18 11:18 AM.  Always use your most recent med list.  
  
  
  
  
 clotrimazole 1 % topical cream  
Commonly known as:  Adrianne Cash Apply  to affected area two (2) times a day. codeine-butalbital-acetaminophen-caffeine -86-30 mg capsule Commonly known as:  FIORICET WITH CODEINE Take 1 Cap by mouth every six (6) hours as needed for Headache. Max Daily Amount: 4 Caps. diphenhydrAMINE 25 mg capsule Commonly known as:  BENADRYL Take 25 mg by mouth every six (6) hours as needed. Indications: INSOMNIA  
  
 ergocalciferol 50,000 unit capsule Commonly known as:  ERGOCALCIFEROL Take 1 Cap by mouth every seven (7) days. naproxen 500 mg tablet Commonly known as:  NAPROSYN Take 1 Tab by mouth two (2) times daily (with meals). ondansetron 4 mg disintegrating tablet Commonly known as:  ZOFRAN ODT Take 1 Tab by mouth every eight (8) hours as needed for Nausea. traZODone 50 mg tablet Commonly known as:  Vincent Oh Take 1 Tab by mouth nightly. valACYclovir 500 mg tablet Commonly known as:  VALTREX Take 1 Tab by mouth two (2) times a day. As needed during outbreaks. Prescriptions Printed Refills  
 codeine-butalbital-acetaminophen-caffeine (FIORICET WITH CODEINE) -77-30 mg capsule 0 Sig: Take 1 Cap by mouth every six (6) hours as needed for Headache. Max Daily Amount: 4 Caps. Class: Print Route: Oral  
  
Prescriptions Sent to Pharmacy Refills  
 ondansetron (ZOFRAN ODT) 4 mg disintegrating tablet 0 Sig: Take 1 Tab by mouth every eight (8) hours as needed for Nausea. Class: Normal  
 Pharmacy: [address] [place] [address] #: [phone] Route: Oral  
  
Follow-up Instructions Return in about 1 week (around 8/22/2018) for to review labs. To-Do List   
 08/15/2018 Lab:  CBC WITH AUTOMATED DIFF   
  
 08/15/2018   Lab:  LIPID PANEL   
  
 08/15/2018 Lab:  METABOLIC PANEL, COMPREHENSIVE   
  
 08/15/2018 Lab:  TSH 3RD GENERATION   
  
 08/15/2018 Lab:  VITAMIN D, 25 HYDROXY   
  
 11/13/2018 Lab:  HEMOGLOBIN A1C W/O EAG Patient Instructions Learning About the [address] Diet What is the Mediterranean diet? The Mediterranean diet is a style of eating rather than a diet plan. It features foods eaten in [place], [place], [place] and [place], and other countries along the [facility]. It emphasizes eating foods like fish, fruits, vegetables, beans, high-fiber breads and whole grains, nuts, and olive oil. This style of eating includes limited red meat, cheese, and sweets. Why choose the Mediterranean diet? A Mediterranean-style diet may improve heart health. It contains more fat than other heart-healthy diets. But the fats are mainly from nuts, unsaturated oils (such as fish oils and olive oil), and certain nut or seed oils (such as canola, soybean, or flaxseed oil). These fats may help protect the heart and blood vessels. How can you get started on the Mediterranean diet? Here are some things you can do to switch to a more Mediterranean way of eating. What to eat · Eat a variety of fruits and vegetables each day, such as grapes, blueberries, tomatoes, broccoli, peppers, figs, olives, spinach, eggplant, beans, lentils, and chickpeas. · Eat a variety of whole-grain foods each day, such as oats, brown rice, and whole wheat bread, pasta, and couscous. · Eat fish at least 2 times a week. Try tuna, salmon, mackerel, lake trout, herring, or sardines. · Eat moderate amounts of low-fat dairy products, such as milk, cheese, or yogurt. · Eat moderate amounts of poultry and eggs. · Choose healthy (unsaturated) fats, such as nuts, olive oil, and certain nut or seed oils like canola, soybean, and flaxseed.  
· Limit unhealthy (saturated) fats, such as butter, palm oil, and coconut oil. And limit fats found in animal products, such as meat and dairy products made with whole milk. Try to eat red meat only a few times a month in very small amounts. · Limit sweets and desserts to only a few times a week. This includes sugar-sweetened drinks like soda. The Mediterranean diet may also include red wine with your meal-1 glass each day for women and up to 2 glasses a day for men. Tips for eating at home · Use herbs, spices, garlic, lemon zest, and citrus juice instead of salt to add flavor to foods. · Add avocado slices to your sandwich instead of barnett. · Have fish for lunch or dinner instead of red meat. Brush the fish with olive oil, and broil or grill it. · Sprinkle your salad with seeds or nuts instead of cheese. · Cook with olive or canola oil instead of butter or oils that are high in saturated fat. · Switch from 2% milk or whole milk to 1% or fat-free milk. · Dip raw vegetables in a vinaigrette dressing or hummus instead of dips made from mayonnaise or sour cream. 
· Have a piece of fruit for dessert instead of a piece of cake. Try baked apples, or have some dried fruit. Tips for eating out · Try broiled, grilled, baked, or poached fish instead of having it fried or breaded. · Ask your  to have your meals prepared with olive oil instead of butter. · Order dishes made with marinara sauce or sauces made from olive oil. Avoid sauces made from cream or mayonnaise. · Choose whole-grain breads, whole wheat pasta and pizza crust, brown rice, beans, and lentils. · Cut back on butter or margarine on bread. Instead, you can dip your bread in a small amount of olive oil. · Ask for a side salad or grilled vegetables instead of french fries or chips. Where can you learn more? Go to http://paulina-cale.info/. Enter 103-670-3117 in the search box to learn more about \"Learning About the Mediterranean Diet. \" Current as of: May 12, 2017 Content Version: 11.7 © 3004-7891 Healthwise, [employer]. Care instructions adapted under license by [employer] (which disclaims liability or warranty for this information). If you have questions about a medical condition or this instruction, always ask your healthcare professional. [address] any warranty or liability for your use of this information. Hyperlipidemia: After Your Visit Your Care Instructions Hyperlipidemia is too much fat in your blood. The body has several kinds of fat, including cholesterol and triglycerides. Your body needs fat for many things, such as making new cells. But too much fat in your blood increases your chances of having a heart attack or stroke. You may be able to lower your cholesterol and triglycerides with a heart-healthy diet, exercise, and if needed, medicine. Your doctor may want you to try lifestyle changes first to see whether they lower the fat in your blood. You may need to take medicine if lifestyle changes do not lower the fat in your blood enough. Follow-up care is a key part of your treatment and safety. Be sure to make and go to all appointments, and call your doctor if you are having problems. Its also a good idea to know your test results and keep a list of the medicines you take. How can you care for yourself at home? Take your medicines · Take your medicines exactly as prescribed. Call your doctor if you think you are having a problem with your medicine. · If you take medicine to lower your cholesterol, go to follow-up visits. You will need to have blood tests. · Do not take large doses of niacin, which is a B vitamin, while taking medicine called statins. It may increase the chance of muscle pain and liver problems. · Talk to your doctor about avoiding grapefruit juice if you are taking statins. Grapefruit juice can raise the level of this medicine in your blood. This could increase side effects. Eat more fruits, vegetables, and fiber · Fruits and vegetables have lots of nutrients that help protect against heart disease, and they have littleif anyfat. Try to eat at least five servings a day. Dark green, deep orange, or yellow fruits and vegetables are healthy choices. · Keep carrots, celery, and other veggies handy for snacks. Buy fruit that is in season and store it where you can see it so that you will be tempted to eat it. Cook dishes that have a lot of veggies in them, such as stir-fries and soups. · Foods high in fiber may reduce your cholesterol and provide important vitamins and minerals. High-fiber foods include whole-grain cereals and breads, oatmeal, beans, brown rice, citrus fruits, and apples. · Buy whole-grain breads and cereals instead of white bread and pastries. Limit saturated fat · Read food labels and try to avoid saturated fat and trans fat. They increase your risk of heart disease. · Use olive or canola oil when you cook. Try cholesterol-lowering spreads, such as Benecol or Take Control. · Bake, broil, grill, or steam foods instead of frying them. · Limit the amount of high-fat meats you eat, including hot dogs and sausages. Cut out all visible fat when you prepare meat. · Eat fish, skinless poultry, and soy products such as tofu instead of high-fat meats. Soybeans may be especially good for your heart. Eat at least two servings of fish a week. Certain fish, such as salmon, contain omega-3 fatty acids, which may help reduce your risk of heart attack. · Choose low-fat or fat-free milk and dairy products. Get exercise, limit alcohol, and quit smoking · Get more exercise. Work with your doctor to set up an exercise program. Even if you can do only a small amount, exercise will help you get stronger, have more energy, and manage your weight and your stress. Walking is an easy way to get exercise. Gradually increase the amount you walk every day. Aim for at least 30 minutes on most days of the week.  You also may want to swim, bike, or do other activities. · Limit alcohol to no more than 2 drinks a day for men and 1 drink a day for women. · Do not smoke. If you need help quitting, talk to your doctor about stop-smoking programs and medicines. These can increase your chances of quitting for good. When should you call for help? Call 911 anytime you think you may need emergency care. For example, call if: 
· You have symptoms of a heart attack. These may include: ¨ Chest pain or pressure, or a strange feeling in the chest. 
¨ Sweating. ¨ Shortness of breath. ¨ Nausea or vomiting. ¨ Pain, pressure, or a strange feeling in the back, neck, jaw, or upper belly or in one or both shoulders or arms. ¨ Lightheadedness or sudden weakness. ¨ A fast or irregular heartbeat. After you call 911, the  may tell you to chew 1 adult-strength or 2 to 4 low-dose aspirin. Wait for an ambulance. Do not try to drive yourself. · You have signs of a stroke. These may include: 
¨ Sudden numbness, paralysis, or weakness in your face, arm, or leg, especially on only one side of your body. ¨ New problems with walking or balance. ¨ Sudden vision changes. ¨ Drooling or slurred speech. ¨ New problems speaking or understanding simple statements, or feeling confused. ¨ A sudden, severe headache that is different from past headaches. · You passed out (lost consciousness). Call your doctor now or seek immediate medical care if: 
· You have muscle pain or weakness. Watch closely for changes in your health, and be sure to contact your doctor if: 
· You are very tired. · You have an upset stomach, gas, constipation, or belly pain or cramps. Where can you learn more? Go to Apieron.be Enter C406 in the search box to learn more about \"Hyperlipidemia: After Your Visit. \"  
© 3311-9195 Healthwise, Incorporated.  Care instructions adapted under license by [employer] (which disclaims liability or warranty for this information). This care instruction is for use with your licensed healthcare professional. If you have questions about a medical condition or this instruction, always ask your healthcare professional. [address] any warranty or liability for your use of this information. Content Version: 9.5.313045; Last Revised: October 13, 2011 Introducing [facility]! [employer] introduces B[employer]eno patient portal. Now you can access parts of your medical record, email your doctor's office, and request medication refills online. 1. In your internet browser, go to [email or web address] 2. Click on the First Time User? Click Here link in the Sign In box. You will see the New Member Sign Up page. 3. Enter your Bueeno Access Code exactly as it appears below. You will not need to use this code after youve completed the sign-up process. If you do not sign up before the expiration date, you must request a new code. · B[employer]eno Access Code: [email or web address] Expires: 11/13/2018 10:52 AM 
 
4. Enter the last four digits of your Social Security Number (xxxx) and Date of Birth (mm/dd/yyyy) as indicated and click Submit. You will be taken to the next sign-up page. 5. Create a Bueeno ID. This will be your Bueeno login ID and cannot be changed, so think of one that is secure and easy to remember. 6. Create a Bueeno password. You can change your password at any time. 7. Enter your Password Reset Question and Answer. This can be used at a later time if you forget your password. 8. Enter your e-mail address. You will receive e-mail notification when new information is available in [address] 9. Click Sign Up. You can now view and download portions of your medical record. 10. Click the Download Summary menu link to download a portable copy of your medical information. If you have questions, please visit the Frequently Asked Questions section of the [facility] website. Remember, [facility] is NOT to be used for urgent needs. For medical emergencies, dial 911. Now available from your iPhone and Android! Please provide this summary of care documentation to your next provider. Your primary care clinician is listed as [address] If you have any questions after today's visit, please call [phone].

## 2018-08-15 NOTE — PROGRESS NOTES
Rubin Fuentes is a  [address] [facility] presents today for office visit for possible HTN and weight gain. 1. Have you been to the ER, urgent care clinic or hospitalized since your last visit? YES [facility] 13 Aug 2018    2. Have you seen or consulted any other health care providers outside of the [address] [place] [address] since your last visit (Include any pap smears or colon screening)? NO

## 2018-08-15 NOTE — PROGRESS NOTES
Chief Complaint   Patient presents with    Hypertension     patient concern father had a stroke high readings at home    Weight Gain     patient concern of weight gain         HPI:  Patient is a 34year old obese  male with medical problems listed below presents today for follow up with concern of hypertension. He was seen at [facility] on Monday for headache, mild dizziness, body aches, and had blood work and EKG done that was okay. Overall he feels better but still has mild nausea and headache, but denies vomiting, CP, or SOB. He has gained 12 pounds in the last two months and blood pressure is well controlled at the office today. Past Medical History:   Diagnosis Date    Condyloma acuminatum     Environmental allergies     Groin pain     Plantar fasciitis     Tinea pedis        Allergies   Allergen Reactions    Penicillins Rash, Swelling and Hives    Mold Extracts Rash    Peanut Rash and Swelling       Current Outpatient Prescriptions   Medication Sig Dispense Refill    ergocalciferol (ERGOCALCIFEROL) 50,000 unit capsule Take 1 Cap by mouth every seven (7) days. 12 Cap 3    traZODone (DESYREL) 50 mg tablet Take 1 Tab by mouth nightly. 30 Tab 2    valACYclovir (VALTREX) 500 mg tablet Take 1 Tab by mouth two (2) times a day. As needed during outbreaks. 30 Tab 1    naproxen (NAPROSYN) 500 mg tablet Take 1 Tab by mouth two (2) times daily (with meals). 28 Tab 1    clotrimazole (LOTRIMIN) 1 % topical cream Apply  to affected area two (2) times a day. 15 g 3    diphenhydrAMINE (BENADRYL) 25 mg capsule Take 25 mg by mouth every six (6) hours as needed. Indications: INSOMNIA            ROS:  Constitutional: Negative for fever, chills, or fatigue  Neurological: Positive for mild headache.  Negative for dizziness, visual disturbance, or loss of consciousness  Respiratory: Negative for SOB, hemoptysis, or wheezing  Cardiovascular: Negative for chest pain, palpitation, or leg swelling  Gastrointestinal: Positive for nausea. Negative for abdominal pain, vomiting, diarrhea, blood in stool, melena, or heartburn  Musculoskeletal: Negative for falls       Physical Exam:  Visit Vitals    /77    Pulse 79    Temp 98.5 °F (36.9 °C) (Oral)    Resp 18    Ht 5' 8\" (1.727 m)    Wt 240 lb 6.4 oz (109 kg)    SpO2 97%    BMI 36.55 kg/m2     General: a & o x 3, afebrile, well-nourished, interacting appropriately, in no acute distress  Skin: warm and dry, no rashes , no bruises  Neck: supple, symmetrical, no thyromegaly  Respiratory: symmetrical chest expansion, lung sounds clear bilaterally, good air entry, good respiratory effort, no wheezes or crackles  Cardiovascular: normal S1S2, regular rate and rhythm, no murmurs, pulses palpable, no thrill, no carotid or abdominal bruits, no peripheral edema, no JVD  Abdomen: non-distended, normoactive bowel sounds x 4 quadrants, soft, non-tender to palpation  Musculoskeletal: normal ROM on all joints, no swelling or deformity, no perilumbar tenderness, steady gait      Assessment/Plan:    ICD-10-CM ICD-9-CM    1. Nausea R11.0 787.02 ondansetron (ZOFRAN ODT) 4 mg disintegrating tablet   2. Vitamin D deficiency E55.9 268.9 VITAMIN D, 25 HYDROXY   3. Dyslipidemia E78.5 272.4 He was counseled on low fat diet. LIPID PANEL   4. Obesity (BMI 30.0-34. 9) E66.9 278.00 I have reviewed/discussed the above normal BMI with the patient. I have recommended the following interventions: dietary management education, guidance, and counseling, encourage exercise and monitor weight . .     5. Headache, unspecified headache type R51 784.0 codeine-butalbital-acetaminophen-caffeine (FIORICET WITH CODEINE) -59-30 mg capsule   6.  Encounter for long-term (current) use of medications O82.752 Q24.34 METABOLIC PANEL, COMPREHENSIVE      CBC WITH AUTOMATED DIFF      TSH 3RD GENERATION      HEMOGLOBIN A1C W/O EAG      METABOLIC PANEL, COMPREHENSIVE      CBC WITH AUTOMATED DIFF      TSH 3RD GENERATION      HEMOGLOBIN A1C W/O EAG         Orders Placed This Encounter    METABOLIC PANEL, COMPREHENSIVE     Standing Status:   Future     Number of Occurrences:   1     Standing Expiration Date:   8/16/2019    CBC WITH AUTOMATED DIFF     Standing Status:   Future     Number of Occurrences:   1     Standing Expiration Date:   8/16/2019    LIPID PANEL     Standing Status:   Future     Number of Occurrences:   1     Standing Expiration Date:   8/16/2019    VITAMIN D, 25 HYDROXY     Standing Status:   Future     Number of Occurrences:   1     Standing Expiration Date:   8/10/2019    TSH 3RD GENERATION     Standing Status:   Future     Number of Occurrences:   1     Standing Expiration Date:   8/16/2019    HEMOGLOBIN A1C W/O EAG     Standing Status:   Future     Number of Occurrences:   1     Standing Expiration Date:   8/16/2019    LIPID PANEL     Standing Status:   Future     Standing Expiration Date:   2/11/2019    VITAMIN D, 25 HYDROXY     Standing Status:   Future     Standing Expiration Date:   2/11/2019    ondansetron (ZOFRAN ODT) 4 mg disintegrating tablet     Sig: Take 1 Tab by mouth every eight (8) hours as needed for Nausea. Dispense:  30 Tab     Refill:  0    codeine-butalbital-acetaminophen-caffeine (FIORICET WITH CODEINE) -73-30 mg capsule     Sig: Take 1 Cap by mouth every six (6) hours as needed for Headache. Max Daily Amount: 4 Caps. Dispense:  30 Cap     Refill:  0         Additional Notes: Discussed today's diagnosis, treatment plans. Discussed medication indications and side effects. After Visit Summary: Discussed provided printed patient instructions. Answered questions accordingly. Follow-up Disposition:  In 3 months with labs 1 week prior        Angela Strong DO, MPH  Internal Medicine

## 2018-08-16 LAB
25(OH)D3 SERPL-MCNC: 48 NG/ML (ref 32–100)
A-G RATIO,AGRAT: 1.5 RATIO (ref 1.1–2.6)
ABSOLUTE LYMPHOCYTE COUNT, 10803: 2 K/UL (ref 1–4.8)
ALBUMIN SERPL-MCNC: 4.7 G/DL (ref 3.5–5)
ALP SERPL-CCNC: 73 U/L (ref 25–115)
ALT SERPL-CCNC: 36 U/L (ref 5–40)
ANION GAP SERPL CALC-SCNC: 20 MMOL/L
AST SERPL W P-5'-P-CCNC: 27 U/L (ref 10–37)
AVG GLU, 10930: 120 MG/DL (ref 91–123)
BASOPHILS # BLD: 0 K/UL (ref 0–0.2)
BASOPHILS NFR BLD: 1 % (ref 0–2)
BILIRUB SERPL-MCNC: 0.4 MG/DL (ref 0.2–1.2)
BUN SERPL-MCNC: 13 MG/DL (ref 6–22)
CALCIUM SERPL-MCNC: 10.2 MG/DL (ref 8.4–10.4)
CHLORIDE SERPL-SCNC: 96 MMOL/L (ref 98–110)
CHOLEST SERPL-MCNC: 229 MG/DL (ref 110–200)
CO2 SERPL-SCNC: 26 MMOL/L (ref 20–32)
CREAT SERPL-MCNC: 1.1 MG/DL (ref 0.5–1.2)
EOSINOPHIL # BLD: 0.1 K/UL (ref 0–0.5)
EOSINOPHIL NFR BLD: 2 % (ref 0–6)
ERYTHROCYTE [DISTWIDTH] IN BLOOD BY AUTOMATED COUNT: 13.2 % (ref 10–15.5)
GFRAA, 66117: >60
GFRNA, 66118: >60
GLOBULIN,GLOB: 3.1 G/DL (ref 2–4)
GLUCOSE SERPL-MCNC: 104 MG/DL (ref 70–99)
GRANULOCYTES,GRANS: 41 % (ref 40–75)
HBA1C MFR BLD HPLC: 5.8 % (ref 4.8–5.9)
HCT VFR BLD AUTO: 44.1 % (ref 36.6–51.9)
HDLC SERPL-MCNC: 37 MG/DL (ref 40–59)
HDLC SERPL-MCNC: 6.2 MG/DL (ref 0–5)
HGB BLD-MCNC: 14.8 G/DL (ref 13.2–17.3)
LDLC SERPL CALC-MCNC: 165 MG/DL (ref 50–99)
LYMPHOCYTES, LYMLT: 47 % (ref 20–45)
MCH RBC QN AUTO: 31 PG (ref 26–34)
MCHC RBC AUTO-ENTMCNC: 34 G/DL (ref 31–36)
MCV RBC AUTO: 93 FL (ref 80–95)
MONOCYTES # BLD: 0.4 K/UL (ref 0.1–1)
MONOCYTES NFR BLD: 10 % (ref 3–12)
NEUTROPHILS # BLD AUTO: 1.7 K/UL (ref 1.8–7.7)
PLATELET # BLD AUTO: 218 K/UL (ref 140–440)
PMV BLD AUTO: 9.9 FL (ref 9–13)
POTASSIUM SERPL-SCNC: 4.9 MMOL/L (ref 3.5–5.5)
PROT SERPL-MCNC: 7.8 G/DL (ref 6.4–8.3)
RBC # BLD AUTO: 4.77 M/UL (ref 3.8–5.8)
SODIUM SERPL-SCNC: 142 MMOL/L (ref 133–145)
TRIGL SERPL-MCNC: 137 MG/DL (ref 40–149)
TSH SERPL DL<=0.005 MIU/L-ACNC: 2.04 MCU/ML (ref 0.27–4.2)
VLDLC SERPL CALC-MCNC: 27 MG/DL (ref 8–30)
WBC # BLD AUTO: 4.2 K/UL (ref 4–11)

## 2018-10-23 ENCOUNTER — HOSPITAL ENCOUNTER (EMERGENCY)
Age: 33
Discharge: HOME | End: 2018-10-23
Attending: EMERGENCY MEDICINE
Payer: COMMERCIAL

## 2018-10-23 ENCOUNTER — APPOINTMENT (OUTPATIENT)
Dept: GENERAL RADIOLOGY | Age: 33
End: 2018-10-23
Attending: EMERGENCY MEDICINE
Payer: COMMERCIAL

## 2018-10-23 VITALS
DIASTOLIC BLOOD PRESSURE: 74 MMHG | WEIGHT: 220 LBS | TEMPERATURE: 97.8 F | HEIGHT: 69 IN | OXYGEN SATURATION: 99 % | SYSTOLIC BLOOD PRESSURE: 132 MMHG | RESPIRATION RATE: 18 BRPM | BODY MASS INDEX: 32.58 KG/M2 | HEART RATE: 88 BPM

## 2018-10-23 DIAGNOSIS — S93.402A SPRAIN OF LEFT ANKLE, UNSPECIFIED LIGAMENT, INITIAL ENCOUNTER: Primary | ICD-10-CM

## 2018-10-23 DIAGNOSIS — S83.92XA SPRAIN OF LEFT KNEE, UNSPECIFIED LIGAMENT, INITIAL ENCOUNTER: ICD-10-CM
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
## 2018-10-23 PROCEDURE — 73564 X-RAY EXAM KNEE 4 OR MORE: CPT

## 2018-10-23 PROCEDURE — 99284 EMERGENCY DEPT VISIT MOD MDM: CPT

## 2018-10-23 PROCEDURE — 74011250637 HC RX REV CODE- 250/637: Performed by: EMERGENCY MEDICINE

## 2018-10-23 PROCEDURE — L4350 ANKLE CONTROL ORTHO PRE OTS: HCPCS

## 2018-10-23 PROCEDURE — 73610 X-RAY EXAM OF ANKLE: CPT

## 2018-10-23 RX ORDER — IBUPROFEN 600 MG/1
600 TABLET ORAL ONCE
Status: COMPLETED | OUTPATIENT
Start: 2018-10-23 | End: 2018-10-23

## 2018-10-23 RX ORDER — IBUPROFEN 600 MG/1
600 TABLET ORAL
Qty: 12 TAB | Refills: 0 | Status: SHIPPED | OUTPATIENT
Start: 2018-10-23 | End: 2020-01-25

## 2018-10-23 RX ADMIN — IBUPROFEN 600 MG: 600 TABLET ORAL at 08:50

## 2018-10-23 NOTE — ED PROVIDER NOTES
EMERGENCY DEPARTMENT HISTORY AND PHYSICAL EXAM 
 
8:16 AM 
 
 
Date: 10/23/2018 Patient Name: Cathie Mendoza History of Presenting Illness Chief Complaint Patient presents with  Knee Injury  
  left  Ankle Injury  
  left History Provided By: Patient Chief Complaint: Ankle Pain Duration: \"this morning\" Hours Timing:  Acute Location: Left Ankle Quality: Aching Severity: Severe Modifying Factors: Worse after fall at work this morning Associated Symptoms: knee pain Additional History (Context): Cathie Mendoza is a 34 y.o. male with no PMH who presents with an acute onset of severe aching left ankle pain that started \"this morning\" after a fall while at work. Associated Sx include left knee pain. Pt states he was at his work complex when he slipped on the steps. States his ankle hit the steps during the fall. He is unable to place weight on the ankle. Denies any further complaints or symptoms at the moment. PCP: Aryan Nelson, DO 
 
Current Outpatient Medications Medication Sig Dispense Refill  ibuprofen (MOTRIN) 600 mg tablet Take 1 Tab by mouth every six (6) hours as needed for Pain. 12 Tab 0  
 ergocalciferol (ERGOCALCIFEROL) 50,000 unit capsule Take 1 Cap by mouth every seven (7) days. 12 Cap 3 Past History Past Medical History: 
Past Medical History:  
Diagnosis Date  Condyloma acuminatum  Environmental allergies  Groin pain  Plantar fasciitis  Tinea pedis Past Surgical History: 
Past Surgical History:  
Procedure Laterality Date  HX OTHER SURGICAL    
 dental  
 
 
Family History: 
Family History Problem Relation Age of Onset  Hypertension Mother  Diabetes Mother  Hypertension Father Social History: 
Social History Tobacco Use  Smoking status: Never Smoker  Smokeless tobacco: Never Used Substance Use Topics  Alcohol use: No  
 Drug use: No  
 
 
Allergies: Allergies Allergen Reactions  Penicillins Rash, Swelling and Hives  Mold Extracts Rash  Peanut Rash and Swelling Review of Systems Review of Systems Constitutional: Negative for fever. Respiratory: Negative for shortness of breath. Cardiovascular: Negative for chest pain. Musculoskeletal:  
     Positive for knee pain Positive for ankle pain Neurological: Negative for weakness, light-headedness and headaches. All other systems reviewed and are negative. Physical Exam  
 
Visit Vitals /74 (BP 1 Location: Left arm, BP Patient Position: At rest) Pulse 88 Temp 97.8 °F (36.6 °C) Resp 18 Ht 5' 9\" (1.753 m) Wt 99.8 kg (220 lb) SpO2 99% BMI 32.49 kg/m² Physical Exam  
Constitutional: He appears well-developed and well-nourished. No distress. HENT:  
Head: Normocephalic and atraumatic. Neck: Normal range of motion. Neck supple. Abdominal: He exhibits no distension. There is no tenderness. Musculoskeletal:  
Positive for left knee tenderness with no effusion or deformity  
positive for tenderness to medial anterior joint No laxity Positive for tenderness, with manipulation over medial meniscus Positive for left ankle swelling over lateral malleolus, and tenderness over lateral malleolus 
no deformity  
pulses and sensation  intact Negative Roger's test  
Neurological: He is alert. Skin: Skin is warm and dry. No rash noted. Psychiatric: He has a normal mood and affect. Diagnostic Study Results Labs - No results found for this or any previous visit (from the past 12 hour(s)). Radiologic Studies -  
XR KNEE LT MIN 4 V Final Result IMPRESSION: 
 
No acute bony injuries or joint effusion. XR ANKLE LT MIN 3 V Final Result IMPRESSION:   No acute bony injuries. Medical Decision Making I am the first provider for this patient.  
 
I reviewed the vital signs, available nursing notes, past medical history, past surgical history, family history and social history. Vital Signs-Reviewed the patient's vital signs. Pulse Oximetry Analysis - 99 % on RA, normal  
 
Records Reviewed: Nursing Notes (Time of Review: 8:16 AM) 
 
ED Course: Progress Notes, Reevaluation, and Consults: 
 
Provider Notes (Medical Decision Making): Suspect ankle sprain and possible meniscal injury to left knee. No evidence of fracture, dislocation, or tendon/ligament tear. Provided brace, crutches, NSAID and refer to ortho for further eval outpatient Diagnosis Clinical Impression: 1. Sprain of left ankle, unspecified ligament, initial encounter 2. Sprain of left knee, unspecified ligament, initial encounter 3. Fall, initial encounter Disposition: discharged. Follow-up Information Follow up With Specialties Details Why Contact Info Steve Fowler MD Orthopedic Surgery In 3 days  [address] [place] [address] [address] [facility] 
[phone] [address] [facility] EMERGENCY DEPT Emergency Medicine Go to As needed, If symptoms worsen [address] [place] [address] Natalee Sai [address] 
[phone] Medication List  
  
START taking these medications   
ibuprofen 600 mg tablet Commonly known as:  MOTRIN Take 1 Tab by mouth every six (6) hours as needed for Pain. ASK your doctor about these medications   
ergocalciferol 50,000 unit capsule Commonly known as:  ERGOCALCIFEROL Take 1 Cap by mouth every seven (7) days. Where to Get Your Medications Information about where to get these medications is not yet available Ask your nurse or doctor about these medications · ibuprofen 600 mg tablet 
  
 
_______________________________ Attestations: 
Scribe Attestation Emily Ortiz (Aj) acting as a scribe for and in the presence of Domitila Gomez MD     
October 23, 2018 at 8:16 AM 
    
Provider Attestation: I personally performed the services described in the documentation, reviewed the documentation, as recorded by the scribe in my presence, and it accurately and completely records my words and actions. October 23, 2018 at 8:16 AM - Nate Long MD   
_______________________________

## 2018-10-23 NOTE — LETTER
[address] [place] [address] Dr [facility] EMERGENCY DEPT 
[address] [facility] [address] [phone] Work/School Note Date: 10/23/2018 To Whom It May concern: 
 
Fronie Mohs was seen and treated today in the emergency room by the following provider(s): 
Attending Provider: Becka Quiñonez MD. Fronie Mohs may return to work on 10/25/18.  
 
Sincerely, 
 
 
 
 
Becka Quiñonez MD

## 2018-10-23 NOTE — ED TRIAGE NOTES
Patient slipped going down the stairs (approximately 13) this morning. States his left leg \"got stuck behind me\". C/o left knee and left ankle pain and swelling. No obvious deformity. No head or neck injury.

## 2018-10-23 NOTE — DISCHARGE INSTRUCTIONS
Ankle Sprain: Care Instructions  Your Care Instructions    An ankle sprain can happen when you twist your ankle. The ligaments that support the ankle can get stretched and torn. Often the ankle is swollen and painful. Ankle sprains may take from several weeks to several months to heal. Usually, the more pain and swelling you have, the more severe your ankle sprain is and the longer it will take to heal. You can heal faster and regain strength in your ankle with good home treatment. It is very important to give your ankle time to heal completely, so that you do not easily hurt your ankle again. Follow-up care is a key part of your treatment and safety. Be sure to make and go to all appointments, and call your doctor if you are having problems. It's also a good idea to know your test results and keep a list of the medicines you take. How can you care for yourself at home? · Prop up your foot on pillows as much as possible for the next 3 days. Try to keep your ankle above the level of your heart. This will help reduce the swelling. · Follow your doctor's directions for wearing a splint or elastic bandage. Wrapping the ankle may help reduce or prevent swelling. · Your doctor may give you a splint, a brace, an air stirrup, or another form of ankle support to protect your ankle until it is healed. Wear it as directed while your ankle is healing. Do not remove it unless your doctor tells you to. After your ankle has healed, ask your doctor whether you should wear the brace when you exercise. · Put ice or cold packs on your injured ankle for 10 to 20 minutes at a time. Try to do this every 1 to 2 hours for the next 3 days (when you are awake) or until the swelling goes down. Put a thin cloth between the ice and your skin. · You may need to use crutches until you can walk without pain. If you do use crutches, try to bear some weight on your injured ankle if you can do so without pain.  This helps the ankle heal.  · Take pain medicines exactly as directed. ? If the doctor gave you a prescription medicine for pain, take it as prescribed. ? If you are not taking a prescription pain medicine, ask your doctor if you can take an over-the-counter medicine. · If you have been given ankle exercises to do at home, do them exactly as instructed. These can promote healing and help prevent lasting weakness. When should you call for help? Call your doctor now or seek immediate medical care if:    · Your pain is getting worse.     · Your swelling is getting worse.     · Your splint feels too tight or you are unable to loosen it.    Watch closely for changes in your health, and be sure to contact your doctor if:    · You are not getting better after 1 week. Where can you learn more? Go to http://paulina-cale.info/. Enter G304 in the search box to learn more about \"Ankle Sprain: Care Instructions. \"  Current as of: November 29, 2017  Content Version: 11.8  © 7076-1731 [employer]. Care instructions adapted under license by [employer] (which disclaims liability or warranty for this information). If you have questions about a medical condition or this instruction, always ask your healthcare professional. [place] [address] any warranty or liability for your use of this information. Knee Sprain: Care Instructions  Your Care Instructions    A knee sprain is one or more stretched, partly torn, or completely torn knee ligaments. Ligaments are bands of ropelike tissue that connect bone to bone and make the knee stable. The knee has four main ligaments. Knee sprains often happen because of a twisting or bending injury from sports such as skiing, basketball, soccer, or football. The knee turns one way while the lower or upper leg goes another way. A sprain also can happen when the knee is hit from the side or the front.   If a knee ligament is slightly stretched, you will probably need only home treatment. You may need a splint or brace (immobilizer) for a partly torn ligament. A complete tear may need surgery. A minor knee sprain may take up to 6 weeks to heal, while a severe sprain may take months. Follow-up care is a key part of your treatment and safety. Be sure to make and go to all appointments, and call your doctor if you are having problems. It's also a good idea to know your test results and keep a list of the medicines you take. How can you care for yourself at home? · Follow instructions about how much weight you can put on your leg and how to walk with crutches. · Prop up your leg on a pillow when you ice it or anytime you sit or lie down for the next 3 days. Try to keep it above the level of your heart. This will help reduce swelling. · Put ice or a cold pack on your knee for 10 to 20 minutes at a time. Try to do this every 1 to 2 hours for the next 3 days (when you are awake) or until the swelling goes down. Put a thin cloth between the ice and your skin. Do not get the splint wet. · If you have an elastic bandage, make sure it is snug but not so tight that your leg is numb, tingles, or swells below the bandage. You can loosen the bandage if it is too tight. · Your doctor may recommend a brace (immobilizer) to support your knee while it heals. Wear it as directed. · Ask your doctor if you can take an over-the-counter pain medicine, such as acetaminophen (Tylenol), ibuprofen (Advil, Motrin), or naproxen (Aleve). Be safe with medicines. Read and follow all instructions on the label. When should you call for help? Call 911 anytime you think you may need emergency care. For example, call if:    · You have sudden chest pain and shortness of breath, or you cough up blood.    Call your doctor now or seek immediate medical care if:    · You have increased or severe pain.     · You cannot move your toes or ankle.     · Your foot is cool or pale or changes color.   · You have tingling, weakness, or numbness in your foot or leg.     · Your splint or brace feels too tight.     · You are unable to straighten the knee, or the knee \"locks. \"     · You have signs of a blood clot in your leg, such as:  ? Pain in your calf, back of the knee, thigh, or groin. ? Redness and swelling in your leg.    Watch closely for changes in your health, and be sure to contact your doctor if:    · Your pain is not getting better or is getting worse. Where can you learn more? Go to http://paulinaAnalytics Enginescale.info/. Enter N406 in the search box to learn more about \"Knee Sprain: Care Instructions. \"  Current as of: November 29, 2017  Content Version: 11.8  © 1777-4018 [employer] Care instructions adapted under license by [employer] (which disclaims liability or warranty for this information). If you have questions about a medical condition or this instruction, always ask your healthcare professional. [place] [address] any warranty or liability for your use of this information. Learning About RICE (Rest, Ice, Compression, and Elevation)  What is RICE? RICE is a way to care for an injury. RICE helps relieve pain and swelling. It may also help with healing and flexibility. RICE stands for:  · Rest and protect the injured or sore area. · Ice or a cold pack used as soon as possible. · Compression, or wrapping the injured or sore area with an elastic bandage. · Elevation (propping up) the injured or sore area. How do you do RICE? You can use RICE for home treatment when you have general aches and pains or after an injury or surgery. Rest  · Do not put weight on the injury for at least 24 to 48 hours. · Use crutches for a badly sprained knee or ankle. · Support a sprained wrist, elbow, or shoulder with a sling. Ice  · Put ice or a cold pack on the injury right away to reduce pain and swelling.  Frozen vegetables will also work as an ice pack. Put a thin cloth between the ice or cold pack and your skin. The cloth protects the injured area from getting too cold. · Use ice for 10 to 15 minutes at a time for the first 48 to 72 hours. Compression  · Use compression for sprains, strains, and surgeries of the arms and legs. · Wrap the injured area with an elastic bandage or compression sleeve to reduce swelling. · Don't wrap it too tightly. If the area below it feels numb, tingles, or feels cool, loosen the wrap. Elevation  · Use elevation for areas of the body that can be propped up, such as arms and legs. · Prop up the injured area on pillows whenever you use ice. Keep it propped up anytime you sit or lie down. · Try to keep the injured area at or above the level of your heart. This will help reduce swelling and bruising. Where can you learn more? Go to http://paulina-cale.info/. Enter Z801 in the search box to learn more about \"Learning About RICE (Rest, Ice, Compression, and Elevation). \"  Current as of: November 29, 2017  Content Version: 11.8  © 4226-6769 [employer]. Care instructions adapted under license by [employer] (which disclaims liability or warranty for this information). If you have questions about a medical condition or this instruction, always ask your healthcare professional. [place] [address] any warranty or liability for your use of this information.

## 2018-10-30 ENCOUNTER — OFFICE VISIT (OUTPATIENT)
Dept: ORTHOPEDIC SURGERY | Age: 33
End: 2018-10-30

## 2018-10-30 VITALS
SYSTOLIC BLOOD PRESSURE: 133 MMHG | TEMPERATURE: 97.4 F | BODY MASS INDEX: 32.58 KG/M2 | OXYGEN SATURATION: 99 % | HEIGHT: 69 IN | RESPIRATION RATE: 16 BRPM | HEART RATE: 69 BPM | WEIGHT: 220 LBS | DIASTOLIC BLOOD PRESSURE: 90 MMHG

## 2018-10-30 DIAGNOSIS — S93.402A SPRAIN OF LEFT ANKLE, UNSPECIFIED LIGAMENT, INITIAL ENCOUNTER: Primary | ICD-10-CM

## 2018-10-30 DIAGNOSIS — S83.92XA SPRAIN OF LEFT KNEE, UNSPECIFIED LIGAMENT, INITIAL ENCOUNTER: ICD-10-CM

## 2018-10-30 NOTE — PROGRESS NOTES
Edel Lynch  1985   Chief Complaint   Patient presents with    Knee Pain     Left knee pain    Ankle Pain     left ankle pain        HISTORY OF PRESENT ILLNESS  Edel Lynch is a 34 y.o. male who presents today for evaluation of left knee and left ankle  injuries and subsequent pain. He injured his left knee while stepping in his cat's throw up on 10/23/18. He reports coming down and striking his left ankle and left knee. He has been experiencing swelling and pain since the injury. He rates his pain 10/10 today. Pain has been present for the past week since injury on 10/23/18. Patient describes the pain as sharp that is Intermittent in nature. Symptoms are worse with Activity, Exercise, Work and is better with  Rest, Ice, Elevation. Associated symptoms include Swelling. Since problem started, it: is unchanged. Pain does wake patient up at night. Has taken no meds for the problem.    Has tried following treatments: Injections:NO; Brace:YES; Therapy:NO; Cane/Crutch:YES       Allergies   Allergen Reactions    Penicillins Rash, Swelling and Hives    Mold Extracts Rash    Peanut Rash and Swelling        Past Medical History:   Diagnosis Date    Condyloma acuminatum     Environmental allergies     Groin pain     Plantar fasciitis     Tinea pedis       Social History     Socioeconomic History    Marital status:      Spouse name: Not on file    Number of children: Not on file    Years of education: Not on file    Highest education level: Not on file   Social Needs    Financial resource strain: Not on file    Food insecurity - worry: Not on file    Food insecurity - inability: Not on file   Obviousidea needs - medical: Not on file   Obviousidea needs - non-medical: Not on file   Occupational History    Not on file   Tobacco Use    Smoking status: Never Smoker    Smokeless tobacco: Never Used   Substance and Sexual Activity    Alcohol use: No    Drug use: No    Sexual activity: Not on file   Other Topics Concern    Not on file   Social History Narrative    Not on file      Past Surgical History:   Procedure Laterality Date    HX OTHER SURGICAL      dental      Family History   Problem Relation Age of Onset    Hypertension Mother     Diabetes Mother     Hypertension Father       Current Outpatient Medications   Medication Sig    ibuprofen (MOTRIN) 600 mg tablet Take 1 Tab by mouth every six (6) hours as needed for Pain.  ergocalciferol (ERGOCALCIFEROL) 50,000 unit capsule Take 1 Cap by mouth every seven (7) days. No current facility-administered medications for this visit. REVIEW OF SYSTEM   Patient denies: Weight loss, Fever/Chills, HA, Visual changes, Fatigue, Chest pain, SOB, Abdominal pain, N/V/D/C, Blood in stool or urine, Edema. Pertinent positive as above in HPI. All others were negative    PHYSICAL EXAM:   Visit Vitals  /90   Pulse 69   Temp 97.4 °F (36.3 °C) (Oral)   Resp 16   Ht 5' 9\" (1.753 m)   Wt 220 lb (99.8 kg)   SpO2 99%   BMI 32.49 kg/m²     The patient is a well-developed, well-nourished male   in no acute distress. The patient is alert and oriented times three. The patient is alert and oriented times three. Mood and affect are normal.  LYMPHATIC: lymph nodes are not enlarged and are within normal limits  SKIN: normal in color and non tender to palpation. There are no bruises or abrasions noted. NEUROLOGICAL: Motor sensory exam is within normal limits. Reflexes are equal bilaterally.  There is normal sensation to pinprick and light touch  MUSCULOSKELETAL:    Examination Right Ankle/Foot   Skin Intact   Swelling +   Dorsiflexion -5   Plantarflexion 20   Deformity -   Inversion laxity -   Anterior drawer -   Medial malleolus tenderness -   Lateral malleolus tenderness -   Heel cord Intact   Heel cord Tightness -   Roger's Test -   Kenyetta's Test -   Sensation Intact   Bunion -   Capillary refill Normal IMAGING: left ankle dated 10/23/18 was reviewed and read: IMPRESSION:   No acute bony injuries. XR LEFT KNEE dated 10/23/18 was reviewed and read: IMPRESSION:  No acute bony injuries or joint effusion. IMPRESSION:      ICD-10-CM ICD-9-CM    1. Sprain of left ankle, unspecified ligament, initial encounter S93.402A 845.00 REFERRAL TO PHYSICAL THERAPY      AMB SUPPLY ORDER   2. Sprain of left knee, unspecified ligament, initial encounter S83. 92XA 844.9 REFERRAL TO PHYSICAL THERAPY        PLAN:  1. Patient presents with left ankle and knee pain related to left ankle and left knee injuries. I think it will benefit him to use a long fracture walker to ambulate. Risk factors include: n/a  2. No ultrasound exam indicated today  3. No cortisone injection indicated today   4. Yes Physical/Occupational Therapy indicated today LEFT ANKLE LEFT KNEE  5. No diagnostic test indicated today:   6. Yes durable medical equipment indicated today LONG FRACTURE BOOT  7. No referral indicated today   8. No medications indicated today:   9. No Narcotic indicated today     RTC 4 weeks PRN  Follow-up Disposition: Not on File    Scribed by Emmy Bauman [address]) as dictated by Amee Zarate MD    I, Dr. Amee Zarate, confirm that all documentation is accurate.     Amee Zarate M.D.   [facility]

## 2018-11-05 ENCOUNTER — HOSPITAL ENCOUNTER (OUTPATIENT)
Dept: PHYSICAL THERAPY | Age: 33
Discharge: HOME | End: 2018-11-05
Payer: COMMERCIAL

## 2018-11-05 PROCEDURE — 97530 THERAPEUTIC ACTIVITIES: CPT

## 2018-11-05 PROCEDURE — 97161 PT EVAL LOW COMPLEX 20 MIN: CPT

## 2018-11-05 PROCEDURE — 97110 THERAPEUTIC EXERCISES: CPT

## 2018-11-05 NOTE — PROGRESS NOTES
PT DAILY TREATMENT NOTE/KNEE EVAL 10-18    Patient Name: Elvira Covarrubias  Date:2018  : 1985  [x]  Patient  Verified  Payor: Cande Irwin / Plan: [address] [employer] [employer] / Product Type: HMO /    In time:8:40  Out time:9:30  Total Treatment Time (min): 50  Visit #: 1 of     Treatment Area: Sprain of unspecified ligament of left ankle, initial encounter [B85.643I]  Sprain of unspecified site of left knee, initial encounter [S83. 92XA]    SUBJECTIVE  Pain Level (0-10 scale): 0/10  []constant []intermittent []improving []worsening []no change since onset    Any medication changes, allergies to medications, adverse drug reactions, diagnosis change, or new procedure performed?: [x] No    [] Yes (see summary sheet for update)  Subjective functional status/changes:        Pt is a 35 yo M who presents with left knee/ankle pain after falling down the stairs at his apartment complex 2 weeks ago after he slipped on his cat's vomit. Pt fell on his right side, inverting and then everting his ankle. He had an xray that was negative for fractures. He has not had an MRI. Pt had an ankle brace and B axillary crutches initially. Knee pain is on the medial aspect of knee and is increased with lifting leg from a sidelying position for positioning in bed and with pivoting as well as mild discomfort with standing/ambulation. Subjective reports of ankle pain with walking/standing initially.         Barriers: []pain [x]financial []time []transportation []other  Motivation: high  Substance use: []Alcohol []Tobacco []other:   FABQ Score: []low []elevate  Cognition: A & O x 4    Other:    OBJECTIVE/EXAMINATION      28 min [x]Eval                  []Re-Eval       12 min Therapeutic Exercise:  [x] See flow sheet : see HEP   Rationale: increase ROM and increase strength to improve the patients ability to return to work    10 min Therapeutic Activity:  []  See flow sheet :   Rationale: Educated patient regarding findings of evaluation, BP norms, importance of lowering BP, salt and dehydration contribution to BP, advised monitoring BP 4x per day and keeping a BP log, ice/heat use 10-15 minutes, new therex technique and purpose, purpose of therapy, and therapy plan in order to ensure pt understanding/compliance with therapy             With   [] TE   [] TA   [] neuro   [] other: Patient Education: [x] Review HEP    [] Progressed/Changed HEP based on:   [] positioning   [] body mechanics   [] transfers   [] heat/ice application    [] other:      Other Objective/Functional Measures:     /96 taken manually prior to therapy   /90 taken manually prior to therapy     Physical Therapy Evaluation - Knee    Posture: [] Varus    [] Valgus    [] Recurvatum        [] Tibial Torsion    [] Foot Supination    [] Foot Pronation    Describe:    Gait:  [] Normal    [x] Abnormal    [] Antalgic    [] NWB    Device:    Describe:    ROM / Strength  [] Unable to assess                  AROM                      PROM                   Strength (1-5)    Left Right Left Right Left Right   Hip Flexion     4+ 4+    Extension     4 4    Abduction     4 4    Adduction         Knee Flexion [facility] WFL   4 p! 4+    Extension 2 WFL   4 p! 4+   Ankle Plantarflexion 42 WFL        Dorsiflexion 11 WLF   4 p! 4+     Left Ankle AROM:  Inversion: 21 dgrs  Eversion: 14 dgrs    Right Ankle AROM WFL    Ankle MMT:  Inversion: Right 4+/5, Left 4/5 p! Eversion: Right 4+/5, Left 4/5 p!     Flexibility: [] Unable to assess at this time  Hamstrings:    (L) Tightness= [] WNL   [x] Min   [] Mod   [] Severe    (R) Tightness= [] WNL   [] Min   [] Mod   [] Severe  Soleus:    (L) Tightness= [] WNL   [x] Min   [] Mod   [] Severe    (R) Tightness= [] WNL   [] Min   [] Mod   [] Severe    Palpation:   Neg/Pos  Neg/Pos  Neg/Pos   Joint Line pos Quad tendon neg Patellar ligament neg   Patella neg Fibular head neg Pes Anserinus neg   Tibial tubercle neg Hamstring tendons neg Infrapatellar fat pad neg     Optional Tests:  Lachmans  [] Neg    [] Pos Posterior Drawer [x] Neg    [] Pos  Pivot Shift  [] Neg    [] Pos Posterior Sag  [] Neg    [] Pos  FRANCHESCA   [] Neg    [] Pos Diana's Test [] Neg    [] Pos  ALRI   [] Neg    [] Pos Squat   [] Neg    [] Pos  Valgus@ 0 Degrees [] Neg    [x] Pos Aba-Bijan [] Neg    [x] Pos  Valgus@ 30 Degrees [] Neg    [x] Pos Patellar Apprehension [] Neg    [] Pos  Varus@ 0 Degrees [x] Neg    [] Pos Bates's Compression [] Neg    [] Pos  Varus@ 30 Degrees [x] Neg    [] Pos Ely's Test  [] Neg    [] Pos  Apley's Compression [] Neg    [] Pos Gricelda's Test  [] Neg    [] Pos  Apley's Distraction [] Neg    [] Pos Stroke Test  [] Neg    [] Pos   Anterior Drawer [x] Neg    [] Pos Fluctuation Test [] Neg    [] Pos  Other:                  [] Neg    [] Pos                  Other tests/comments: Ankle  Anterior Drawer: [x] Neg    [] Pos  Posterior Drawer:  [x] Neg    [] Pos  Inversion Stress:  [] Neg    [x] Pos  Talar Tilt:   [] Neg    [] Pos  Eversion Stress:  [x] Neg    [] Pos    Discomfort with quad set, hamstring stretch, and gastroc/soleus stretch during HEP  Cues to perform stretches in pain-free range  Increased pain always subsided immediately upon release        Pain Level (0-10 scale) post treatment: 0/10    ASSESSMENT/Changes in Function: see POC    Patient will continue to benefit from skilled PT services to modify and progress therapeutic interventions, address functional mobility deficits, address ROM deficits, address strength deficits, analyze and address soft tissue restrictions, analyze and cue movement patterns, assess and modify postural abnormalities and instruct in home and community integration to attain remaining goals.      [x]  See Plan of Care  []  See progress note/recertification  []  See Discharge Summary         Progress towards goals / Updated goals:  See POC    PLAN  [x]  Upgrade activities as tolerated     []  Continue plan of care  [x]  Update interventions per flow sheet       []  Discharge due to:_  []  Other:_      Mignon Coyle, PT 11/5/2018  8:41 AM

## 2018-11-05 NOTE — PROGRESS NOTES
[facility]  [address] [place] [address]  [phone] fax    Plan of Care/ Statement of Necessity for Physical Therapy Services    Patient name: Renae Trevino Start of Care: 2018   Referral source: Marshall Lee,* : 1985    Medical Diagnosis: Sprain of unspecified ligament of left ankle, initial encounter [S93.402A]  Sprain of unspecified site of left knee, initial encounter [S83. 92XA]  Payor: Derian Cleaning / Plan: [place] [employer] / Product Type: HMO /  Onset Date:2 weeks ago     Treatment Diagnosis: Left Knee/Ankle Pain   Prior Hospitalization: see medical history Provider#: [id number]   Medications: Verified on Patient summary List    Comorbidities: none   Prior Level of Function:  (running, pivoting, fighting), lives with family, Ind with ambulation      The Plan of Care and following information is based on the information from the initial evaluation. Assessment/ key information: Pt is a 35 yo M who presents with left knee/ankle pain after falling down the stairs at his apartment complex 2 weeks ago after he slipped on his cat's vomit. Pt fell on his right side, inverting and then everting his ankle. He had an xray that was negative for fractures. He has not had an MRI. Pt had an ankle brace and B axillary crutches initially and currently presents in CAM boot without AD. Knee pain is on the medial aspect of knee and is increased with lifting leg from a sidelying position for positioning in bed and with pivoting as well as mild discomfort with standing/ambulation. Subjective reports of lateral ankle pain with walking/standing immediately. Pt is TTP over medial joint line of left knee, left distal fibularis musculature, left anterior talofibular ligament, left Astrid's tendon, and left dorsal aspect of 5th metatarsal.  Left knee extension AROM was 2 dgrs, with pain with quad set and hamstring stretch. Minor AROM deficits were evident in left ankle, and left knee/ankle strength were decreased, limited by pain. (+) valgus stress test and (+)Carole's test are indicative of MCL and medial meniscus involvement respectively. Pain over lateral muscular/ligamentous structures and (+) inversion stress test at left ankle are consistent with lateral ankle sprain. Pt will benefit from skilled PT to address strength, ROM, and flexibility deficits in order to perform work tasks and return to [facility]. Evaluation Complexity History LOW Complexity : Zero comorbidities / personal factors that will impact the outcome / POC; Examination MEDIUM Complexity : 3 Standardized tests and measures addressing body structure, function, activity limitation and / or participation in recreation  ;Presentation MEDIUM Complexity : Evolving with changing characteristics  ; Clinical Decision Making MEDIUM Complexity : FOTO score of 26-74  Overall Complexity Rating: LOW   Problem List: pain affecting function, decrease ROM, decrease strength, edema affecting function, impaired gait/ balance, decrease ADL/ functional abilitiies, decrease activity tolerance, decrease flexibility/ joint mobility and decrease transfer abilities   Treatment Plan may include any combination of the following: Therapeutic exercise, Therapeutic activities, Neuromuscular re-education, Physical agent/modality, Gait/balance training, Manual therapy, Patient education, Self Care training, Functional mobility training, Home safety training and Stair training  Patient / Family readiness to learn indicated by: asking questions, trying to perform skills and interest  Persons(s) to be included in education: patient (P)  Barriers to Learning/Limitations: Financial - High copays limiting therapy attendance   Patient Goal (s): strengthen my ankle and knee and hopefully rid of pain  Patient Self Reported Health Status: fair  Rehabilitation Potential: good    Short Term Goals:  To be accomplished in 1 weeks:  1. Pt will be compliant with initial HEP to improve therapy outcomes   Long Term Goals: To be accomplished in 6 weeks:  1. Pt will improve FOTO by 24 points in order to demonstrate functional improvement. 2. Pt will improve left knee extension to 0 dgrs and left ankle DF to 15 dgrs in order to return to running  3. Pt will improve left knee/ankle strength to 4+/5 in order to complete work tasks  4. Pt will report 50% improvement in sx in order to improve QOL. Frequency / Duration: Patient to be seen 2-3 times per week for 6 weeks. Patient/ CarPatient/ Caregiver education and instruction: Diagnosis, prognosis, exercises   [x]  Plan of care has been reviewed with KIMO Rodríguez, PT 11/5/2018 8:40 AM    ________________________________________________________________________    I certify that the above Therapy Services are being furnished while the patient is under my care. I agree with the treatment plan and certify that this therapy is necessary.     Physician's Signature:____________Date:_________TIME:________    ** Signature, Date and Time must be completed for valid certification **    Please sign and return to [facility]  [address] [place] [address]  [phone] fax

## 2018-11-09 ENCOUNTER — DOCUMENTATION ONLY (OUTPATIENT)
Dept: ORTHOPEDIC SURGERY | Age: 33
End: 2018-11-09

## 2018-11-09 NOTE — PROGRESS NOTES
Pt dropped off forms at [facility] location for completion--FMLA FORMS 11PGS    Please fax to f# on forms and call pt to  originals when ready.     Pt p#[phone]

## 2018-11-13 ENCOUNTER — OFFICE VISIT (OUTPATIENT)
Dept: ORTHOPEDIC SURGERY | Age: 33
End: 2018-11-13

## 2018-11-13 VITALS
WEIGHT: 220 LBS | HEIGHT: 69 IN | RESPIRATION RATE: 16 BRPM | BODY MASS INDEX: 32.58 KG/M2 | SYSTOLIC BLOOD PRESSURE: 139 MMHG | OXYGEN SATURATION: 98 % | TEMPERATURE: 97.3 F | HEART RATE: 78 BPM | DIASTOLIC BLOOD PRESSURE: 86 MMHG

## 2018-11-13 DIAGNOSIS — E55.9 VITAMIN D DEFICIENCY: ICD-10-CM

## 2018-11-13 DIAGNOSIS — S93.402D SPRAIN OF LEFT ANKLE, UNSPECIFIED LIGAMENT, SUBSEQUENT ENCOUNTER: Primary | ICD-10-CM

## 2018-11-13 DIAGNOSIS — E78.5 DYSLIPIDEMIA: ICD-10-CM

## 2018-11-13 NOTE — LETTER
NOTIFICATION RETURN TO WORK / SCHOOL 
 
11/13/2018 12:08 PM 
 
Mr. Tonie Frankel [facility] [id number] To Whom It May Concern: 
 
Tonie Frankel is currently under the care of [address] [place], [place] [address]kyara Jean. He will return to work with the following restrictions: sitting work only If there are questions or concerns please have the patient contact our office. Sincerely, Wild Joshua MD

## 2018-11-13 NOTE — PROGRESS NOTES
Humphrey Carrera  1985   Chief Complaint   Patient presents with    Knee Pain     LEFT KNEE PAIN    Foot Pain     LEFT FOOT PAIN        HISTORY OF PRESENT ILLNESS  Humphrey Carrera is a 34 y.o. male who presents today for reevaluation of left knee and foot pain. Patient rates pain as 5/10 today. He injured his left knee while stepping in his cat's throw up on 10/23/18. He reports coming down and striking his left ankle and left knee. He has been experiencing swelling and pain since the injury. He has been wearing the boot, but d/c use for the last two days. Still some pain in the ankle. Patient denies any fever, chills, chest pain, shortness of breath or calf pain. The remainder of the review of systems is negative. There are no new illness or injuries to report since last seen in the office. There are no changes to medications, allergies, family or social history. PHYSICAL EXAM:   Visit Vitals  /86   Pulse 78   Temp 97.3 °F (36.3 °C) (Oral)   Resp 16   Ht 5' 9\" (1.753 m)   Wt 220 lb (99.8 kg)   SpO2 98%   BMI 32.49 kg/m²     The patient is a well-developed, well-nourished male   in no acute distress. The patient is alert and oriented times three. The patient is alert and oriented times three. Mood and affect are normal.  LYMPHATIC: lymph nodes are not enlarged and are within normal limits  SKIN: normal in color and non tender to palpation. There are no bruises or abrasions noted. NEUROLOGICAL: Motor sensory exam is within normal limits. Reflexes are equal bilaterally.  There is normal sensation to pinprick and light touch  MUSCULOSKELETAL:     Examination Right Ankle/Foot   Skin Intact   Swelling +   Dorsiflexion -5   Plantarflexion 20   Deformity -   Inversion laxity -   Anterior drawer -   Medial malleolus tenderness -   Lateral malleolus tenderness -   Heel cord Intact   Heel cord Tightness -   Roger's Test -   Kenyetta's Test -   Sensation Intact   Bunion -   Capillary refill Normal      IMAGING: left ankle dated 10/23/18 was reviewed and read: IMPRESSION:   No acute bony injuries.     XR LEFT KNEE dated 10/23/18 was reviewed and read: IMPRESSION:  No acute bony injuries or joint effusion.       IMPRESSION:      ICD-10-CM ICD-9-CM    1. Sprain of left ankle, unspecified ligament, subsequent encounter S93.402D V58.89 MRI ANKLE LT WO CONT     845.00 MRI KNEE LT WO CONT        PLAN:   1. The patient presents today with left ankle pain not improved since last OV. I would like him to get an MRI. Provided with a work note today. Risk factors include: n/a  2. No ultrasound exam indicated today  3. No cortisone injection indicated today   4. No Physical/Occupational Therapy indicated today  5. Yes diagnostic test indicated today: L ANKLE, L KNEE  6. No durable medical equipment indicated today  7. No referral indicated today   8. No medications indicated today:   9. No Narcotic indicated today      RTC following MRI  Follow-up Disposition: Not on File    Scribed by Lobo Tipton [address]) as dictated by Arnaldo Medina MD    I, Dr. Arnaldo Medina, confirm that all documentation is accurate.     Arnaldo Medina M.D.   [address] [place] [address] and Spine Specialist

## 2018-11-14 ENCOUNTER — DOCUMENTATION ONLY (OUTPATIENT)
Dept: ORTHOPEDIC SURGERY | Age: 33
End: 2018-11-14

## 2018-11-26 ENCOUNTER — HOSPITAL ENCOUNTER (OUTPATIENT)
Age: 33
Discharge: HOME | End: 2018-11-26
Attending: ORTHOPAEDIC SURGERY
Payer: COMMERCIAL

## 2018-11-26 ENCOUNTER — DOCUMENTATION ONLY (OUTPATIENT)
Dept: ORTHOPEDIC SURGERY | Age: 33
End: 2018-11-26

## 2018-11-26 DIAGNOSIS — S93.402D SPRAIN OF LEFT ANKLE, UNSPECIFIED LIGAMENT, SUBSEQUENT ENCOUNTER: ICD-10-CM

## 2018-11-26 PROCEDURE — 73721 MRI JNT OF LWR EXTRE W/O DYE: CPT

## 2018-11-26 NOTE — PROGRESS NOTES
11-26-18  Patient dropped off at [facility] Return to Duty forms from [address]. Patient will  paperwork at the [facility] location. Patient is aware of the 0-07 business day policy, but has an appt scheduled 11-29-18. Patient can be reached at [phone].

## 2018-11-29 ENCOUNTER — OFFICE VISIT (OUTPATIENT)
Dept: ORTHOPEDIC SURGERY | Age: 33
End: 2018-11-29

## 2018-11-29 ENCOUNTER — DOCUMENTATION ONLY (OUTPATIENT)
Dept: ORTHOPEDIC SURGERY | Age: 33
End: 2018-11-29

## 2018-11-29 VITALS
BODY MASS INDEX: 35.78 KG/M2 | DIASTOLIC BLOOD PRESSURE: 89 MMHG | HEIGHT: 69 IN | HEART RATE: 63 BPM | WEIGHT: 241.6 LBS | RESPIRATION RATE: 16 BRPM | TEMPERATURE: 98.5 F | SYSTOLIC BLOOD PRESSURE: 139 MMHG | OXYGEN SATURATION: 100 %

## 2018-11-29 DIAGNOSIS — S83.402A SPRAIN OF COLLATERAL LIGAMENT OF LEFT KNEE, INITIAL ENCOUNTER: ICD-10-CM

## 2018-11-29 DIAGNOSIS — S93.492A HIGH ANKLE SPRAIN, LEFT, INITIAL ENCOUNTER: Primary | ICD-10-CM

## 2018-11-29 PROBLEM — E66.01 SEVERE OBESITY (HCC): Status: ACTIVE | Noted: 2018-11-29

## 2018-11-29 NOTE — PROGRESS NOTES
Eliica Shearer  1985   Chief Complaint   Patient presents with    Knee Pain     left knee pain     Ankle Pain     left ankle pain         HISTORY OF PRESENT ILLNESS  Elicia Shearer is a 34 y.o. male who presents today for reevaluation of left knee and foot pain and to review MRI. Patient rates pain as 3/10 today. He injured his left knee while stepping in his cat's throw up on 10/23/18. He reports coming down and striking his left ankle and left knee. He has been experiencing swelling and pain since the injury. He has been wearing the boot, but d/c use for the last two days. Still some pain in the ankle. He states he has improved greatly since last OV. Some pain with certain movements. Patient denies any fever, chills, chest pain, shortness of breath or calf pain. The remainder of the review of systems is negative. There are no new illness or injuries to report since last seen in the office. There are no changes to medications, allergies, family or social history. PHYSICAL EXAM:   Visit Vitals  /89   Pulse 63   Temp 98.5 °F (36.9 °C)   Resp 16   Ht 5' 9\" (1.753 m)   Wt 241 lb 9.6 oz (109.6 kg)   SpO2 100%   BMI 35.68 kg/m²     The patient is a well-developed, well-nourished male   in no acute distress. The patient is alert and oriented times three. The patient is alert and oriented times three. Mood and affect are normal.  LYMPHATIC: lymph nodes are not enlarged and are within normal limits  SKIN: normal in color and non tender to palpation. There are no bruises or abrasions noted. NEUROLOGICAL: Motor sensory exam is within normal limits. Reflexes are equal bilaterally.  There is normal sensation to pinprick and light touch  MUSCULOSKELETAL:  Examination Left Ankle/Foot   Skin Intact   Swelling +   Dorsiflexion -5   Plantarflexion 20   Deformity -   Inversion laxity -   Anterior drawer -   Medial malleolus tenderness -   Lateral malleolus tenderness -   Heel cord Intact Heel cord Tightness -   Roger's Test -   Kenyetta's Test -   Sensation Intact   Bunion -   Capillary refill Normal      Examination Left knee   Skin Intact   Range of motion 0-130   Effusion -   Medial joint line tenderness -   Lateral joint line tenderness -   Tenderness Pes Bursa -   Tenderness insertion MCL -   Tenderness insertion LCL -   Abas -   Patella crepitus -   Patella grind -   Lachman -   Pivot shift -   Anterior drawer -   Posterior drawer -   Varus stress -   Valgus stress -   Neurovascular Intact   Calf Swelling and Tenderness to Palpation -   Kenyetta's Test -   Hamstring Cord Tightness -       IMAGING: MRI of left knee dated 11/26/18 was reviewed and read: IMPRESSION:  1.  Grade 2 injury of the proximal tibial collateral ligament with surrounding edema, as above. 2.  Small Baker cyst.  3.  Intact menisci, cruciate, and collateral ligaments. 4.  Grade 1/2 chondrosis in the lateral tibial plateau and grade 1 chondrosis in the patellofemoral compartment as above. 5.  Small likely ganglion or synovial cyst adjacent to the PCL insertion.     MRI of left ankle dated 11/26/18 was reviewed and read: IMPRESSION:  1.  Grade 2/3 injury of the anterior inferior tibiofibular ligament and grade 1/2 injury of the posterior inferior tibiofibular ligament (high ankle sprain). Indistinctness of the calcaneofibular ligament, likely reflecting grade 1/2 injury. Intact anterior and posterior talofibular ligaments. 2.  Small moderate-sized ankle joint effusion. 3.  No evidence of fracture or osteonecrosis. 4.  Small multilobulated T2 hyperintense structures as described above, favored to represent synovial or ganglion cysts. 5.  Mild chronic plantar fasciitis.     left ankle dated 10/23/18 was reviewed and read: IMPRESSION:   No acute bony injuries.     XR LEFT KNEE dated 10/23/18 was reviewed and read: IMPRESSION:  No acute bony injuries or joint effusion. IMPRESSION:      ICD-10-CM ICD-9-CM    1.  High ankle sprain, left, initial encounter S93.432A 845.03 AMB SUPPLY ORDER   2. Sprain of collateral ligament of left knee, initial encounter S83.402A 844.1         PLAN:   1. The patient presents today with left ankle and knee pain which has greatly improved since last OV. Provided with a work note today. Risk factors include: n/a  2. No ultrasound exam indicated today  3. No cortisone injection indicated today   4. No Physical/Occupational Therapy indicated today  5. No diagnostic test indicated today   6. Yes durable medical equipment indicated today L ANKLE BRACE  7. No referral indicated today   8. No medications indicated today:   9. No Narcotic indicated today      RTC 4 weeks  Follow-up Disposition: Not on File    Scribed by Mariana Sosa [facility]) as dictated by Camille Gallego MD    I, Dr. Camille Gallego, confirm that all documentation is accurate.     Camille Gallego M.D.   [facility] and Spine Specialist

## 2018-12-04 ENCOUNTER — APPOINTMENT (OUTPATIENT)
Dept: PHYSICAL THERAPY | Age: 33
End: 2018-12-04

## 2018-12-18 ENCOUNTER — APPOINTMENT (OUTPATIENT)
Dept: PHYSICAL THERAPY | Age: 33
End: 2018-12-18

## 2018-12-27 ENCOUNTER — OFFICE VISIT (OUTPATIENT)
Dept: ORTHOPEDIC SURGERY | Age: 33
End: 2018-12-27

## 2018-12-27 VITALS
OXYGEN SATURATION: 100 % | DIASTOLIC BLOOD PRESSURE: 99 MMHG | HEIGHT: 69 IN | HEART RATE: 71 BPM | BODY MASS INDEX: 35.73 KG/M2 | WEIGHT: 241.2 LBS | TEMPERATURE: 97.7 F | SYSTOLIC BLOOD PRESSURE: 143 MMHG | RESPIRATION RATE: 16 BRPM

## 2018-12-27 DIAGNOSIS — S93.492D HIGH ANKLE SPRAIN, LEFT, SUBSEQUENT ENCOUNTER: Primary | ICD-10-CM

## 2018-12-27 DIAGNOSIS — S83.402D SPRAIN OF COLLATERAL LIGAMENT OF LEFT KNEE, SUBSEQUENT ENCOUNTER: ICD-10-CM

## 2018-12-27 NOTE — PROGRESS NOTES
Anny Granados  1985   No chief complaint on file. HISTORY OF PRESENT ILLNESS  Anny Granados is a 34 y.o. male who presents today for reevaluation of left knee and foot pain. Patient rates pain as 2/10 today. He injured his left knee while stepping in his cat's throw up on 10/23/18. He reports coming down and striking his left ankle and left knee. He has been experiencing swelling and pain since the injury. He has been wearing the boot, but d/c use for the last two days. Still some pain in the ankle. He states he has improved greatly since last OV. Some pain with certain movements. He reports lingering soreness and sharp pain with walking. He has only attending 1 session of PT due to the cost. He has been doing dispatch at work. Patient denies any fever, chills, chest pain, shortness of breath or calf pain. The remainder of the review of systems is negative. There are no new illness or injuries to report since last seen in the office. There are no changes to medications, allergies, family or social history. PHYSICAL EXAM:   Visit Vitals  BP (!) 143/99   Pulse 71   Temp 97.7 °F (36.5 °C)   Resp 16   Ht 5' 9\" (1.753 m)   Wt 241 lb 3.2 oz (109.4 kg)   SpO2 100%   BMI 35.62 kg/m²     The patient is a well-developed, well-nourished male   in no acute distress. The patient is alert and oriented times three. The patient is alert and oriented times three. Mood and affect are normal.  LYMPHATIC: lymph nodes are not enlarged and are within normal limits  SKIN: normal in color and non tender to palpation. There are no bruises or abrasions noted. NEUROLOGICAL: Motor sensory exam is within normal limits. Reflexes are equal bilaterally.  There is normal sensation to pinprick and light touch  MUSCULOSKELETAL:  Examination Left Ankle/Foot   Skin Intact   Swelling +   Dorsiflexion -5   Plantarflexion 20   Deformity -   Inversion laxity -   Anterior drawer -   Medial malleolus tenderness - Lateral malleolus tenderness -   Heel cord Intact   Heel cord Tightness -   Roger's Test -   Kenyetta's Test -   Sensation Intact   Bunion -   Capillary refill Normal      Examination Left knee   Skin Intact   Range of motion 0-130   Effusion -   Medial joint line tenderness -   Lateral joint line tenderness -   Tenderness Pes Bursa -   Tenderness insertion MCL -   Tenderness insertion LCL -   Abas -   Patella crepitus -   Patella grind -   Lachman -   Pivot shift -   Anterior drawer -   Posterior drawer -   Varus stress -   Valgus stress -   Neurovascular Intact   Calf Swelling and Tenderness to Palpation -   Kenyetta's Test -   Hamstring Cord Tightness -       IMAGING: MRI of left knee dated 11/26/18 was reviewed and read: IMPRESSION:  1.  Grade 2 injury of the proximal tibial collateral ligament with surrounding edema, as above. 2.  Small Baker cyst.  3.  Intact menisci, cruciate, and collateral ligaments. 4.  Grade 1/2 chondrosis in the lateral tibial plateau and grade 1 chondrosis in the patellofemoral compartment as above. 5.  Small likely ganglion or synovial cyst adjacent to the PCL insertion.     MRI of left ankle dated 11/26/18 was reviewed and read: IMPRESSION:  1.  Grade 2/3 injury of the anterior inferior tibiofibular ligament and grade 1/2 injury of the posterior inferior tibiofibular ligament (high ankle sprain). Indistinctness of the calcaneofibular ligament, likely reflecting grade 1/2 injury. Intact anterior and posterior talofibular ligaments. 2.  Small moderate-sized ankle joint effusion. 3.  No evidence of fracture or osteonecrosis. 4.  Small multilobulated T2 hyperintense structures as described above, favored to represent synovial or ganglion cysts.   5.  Mild chronic plantar fasciitis.     left ankle dated 10/23/18 was reviewed and read: IMPRESSION: No acute bony injuries.     XR LEFT KNEE dated 10/23/18 was reviewed and read: IMPRESSION:  No acute bony injuries or joint effusion. IMPRESSION:      ICD-10-CM ICD-9-CM    1. High ankle sprain, left, subsequent encounter S93.432D V58.89      845.03    2. Sprain of collateral ligament of left knee, subsequent encounter S83.402D V58.89      844.1         PLAN:   1. The patient presents today with left ankle and knee pain which has improved since last OV, but still some lingering pain. Provided with a work note today. 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 Risk factors include: n/a  2. No ultrasound exam indicated today  3. No cortisone injection indicated today   4. No Physical/Occupational Therapy indicated today  5. No diagnostic test indicated today   6. No durable medical equipment indicated today   7. No referral indicated today   8. No medications indicated today:   9. No Narcotic indicated today      RTC 3 weeks  Follow-up Disposition: Not on File    Scribed by Lobo Rios[address]) as dictated by Arnaldo Medina MD    I, Dr. Arnaldo Medina, confirm that all documentation is accurate.     Arnaldo Medina M.D.   [facility] and Spine Specialist

## 2018-12-27 NOTE — LETTER
NOTIFICATION RETURN TO WORK / SCHOOL 
 
12/27/2018 8:52 AM 
 
[facility] [address] To Whom It May Concern: 
 
Corona Pope is currently under the care of [address] [place], [place] [address]kyara Jean. He will return to work with the following restrictions: sitting work (dispatch) only x 3 weeks. If there are questions or concerns please have the patient contact our office. Sincerely, Frannie De La Cruz MD

## 2018-12-27 NOTE — PROGRESS NOTES
1. Have you been to the ER, urgent care clinic since your last visit? Hospitalized since your last visit? No    2. Have you seen or consulted any other health care providers outside of the [address] [place] [address] since your last visit? Include any pap smears or colon screening.  No

## 2019-01-02 NOTE — PROGRESS NOTES
[facility] [place] [employer] [facility]  [address] [place] [address]  [phone] fax    Physical Therapy Discharge Summary    Patient name: Elvira Covarrubias Start of Care: 18   Referral source: Malgorzata Mosquera,* : 1985   Medical/Treatment Diagnosis: Sprain of unspecified ligament of left ankle, initial encounter [S93.402A]  Sprain of unspecified site of left knee, initial encounter [S83. 92XA]  Payor: Cande Irwin / Plan: [place] [employer] PPO / Product Type: PPO /  Onset Date:2 weeks ago     Prior Hospitalization: see medical history Provider#: [id number]   Medications: Verified on Patient Summary List    Comorbidities: none   Prior Level of Function:  (running, pivoting, fighting), lives with family, Ind with ambulation     Visits from Start of Care: 1   Missed Visits: 1    Reporting Period : 18 to 18    Summary of Care:  Short Term Goals: To be accomplished in 1 weeks:  1. Pt will be compliant with initial HEP to improve therapy outcomes   Long Term Goals: To be accomplished in 6 weeks:  1. Pt will improve FOTO by 24 points in order to demonstrate functional improvement. 2. Pt will improve left knee extension to 0 dgrs and left ankle DF to 15 dgrs in order to return to running  3. Pt will improve left knee/ankle strength to 4+/5 in order to complete work tasks  4. Pt will report 50% improvement in sx in order to improve QOL.     No goals met as pt did not return to therapy after initial evaluation     ASSESSMENT/RECOMMENDATIONS:  Pt called and self-DC. He did not give a reason when asked. DC at this time.       [x]Discontinue therapy: []Patient has reached or is progressing toward set goals      [x]Patient is non-compliant or has abdicated      []Due to lack of appreciable progress towards set goals    Leo Camacho, PT 2019 11:17 AM

## 2019-01-07 ENCOUNTER — TELEPHONE (OUTPATIENT)
Dept: FAMILY MEDICINE CLINIC | Age: 34
End: 2019-01-07

## 2019-01-07 ENCOUNTER — OFFICE VISIT (OUTPATIENT)
Dept: FAMILY MEDICINE CLINIC | Age: 34
End: 2019-01-07

## 2019-01-07 VITALS
SYSTOLIC BLOOD PRESSURE: 159 MMHG | HEART RATE: 83 BPM | WEIGHT: 244 LBS | RESPIRATION RATE: 20 BRPM | BODY MASS INDEX: 36.14 KG/M2 | OXYGEN SATURATION: 97 % | DIASTOLIC BLOOD PRESSURE: 98 MMHG | HEIGHT: 69 IN | TEMPERATURE: 98.6 F

## 2019-01-07 DIAGNOSIS — E55.9 VITAMIN D DEFICIENCY: ICD-10-CM

## 2019-01-07 DIAGNOSIS — J01.10 ACUTE NON-RECURRENT FRONTAL SINUSITIS: Primary | ICD-10-CM

## 2019-01-07 DIAGNOSIS — R03.0 ELEVATED BLOOD PRESSURE READING: ICD-10-CM

## 2019-01-07 DIAGNOSIS — R05.9 COUGH: ICD-10-CM

## 2019-01-07 DIAGNOSIS — G47.33 OBSTRUCTIVE SLEEP APNEA SYNDROME: ICD-10-CM

## 2019-01-07 RX ORDER — VALACYCLOVIR HYDROCHLORIDE 500 MG/1
TABLET, FILM COATED ORAL 2 TIMES DAILY
Status: CANCELLED | OUTPATIENT
Start: 2019-01-07

## 2019-01-07 RX ORDER — ERGOCALCIFEROL 1.25 MG/1
50000 CAPSULE ORAL
Qty: 12 CAP | Refills: 3 | Status: SHIPPED | OUTPATIENT
Start: 2019-01-07 | End: 2019-12-23 | Stop reason: SDUPTHER

## 2019-01-07 RX ORDER — VALACYCLOVIR HYDROCHLORIDE 500 MG/1
TABLET, FILM COATED ORAL 2 TIMES DAILY
COMMUNITY
End: 2020-02-10

## 2019-01-07 RX ORDER — CODEINE PHOSPHATE AND GUAIFENESIN 10; 100 MG/5ML; MG/5ML
10 SOLUTION ORAL
Qty: 120 ML | Refills: 0 | Status: SHIPPED | OUTPATIENT
Start: 2019-01-07 | End: 2019-12-28

## 2019-01-07 RX ORDER — MONTELUKAST SODIUM 10 MG/1
10 TABLET ORAL DAILY
Qty: 30 TAB | Refills: 5 | Status: SHIPPED | OUTPATIENT
Start: 2019-01-07 | End: 2020-01-25

## 2019-01-07 RX ORDER — AZITHROMYCIN 250 MG/1
TABLET, FILM COATED ORAL
Qty: 6 TAB | Refills: 0 | Status: SHIPPED | OUTPATIENT
Start: 2019-01-07 | End: 2019-01-12

## 2019-01-07 RX ORDER — VALACYCLOVIR HYDROCHLORIDE 1 G/1
1000 TABLET, FILM COATED ORAL DAILY
Qty: 60 TAB | Refills: 5 | Status: SHIPPED | OUTPATIENT
Start: 2019-01-07 | End: 2019-12-23 | Stop reason: SDUPTHER

## 2019-01-07 NOTE — PROGRESS NOTES
Patient is in the office today for cold. 1. Have you been to the ER, urgent care clinic since your last visit? Hospitalized since your last visit? No    2. Have you seen or consulted any other health care providers outside of the [facility] since your last visit? Include any pap smears or colon screening.  No

## 2019-01-07 NOTE — PATIENT INSTRUCTIONS

## 2019-01-07 NOTE — TELEPHONE ENCOUNTER
Pt is calling b/c he spoke with someone from sleep meds and they require him to get a script from the doctor for his cpap supplies. Pt also said that he may need a new machine because his current one may be contaminated. Please advise.    [phone]

## 2019-01-07 NOTE — PROGRESS NOTES
HISTORY OF PRESENT ILLNESS  Rose Garcia is a 34 y.o. male. Cold Symptoms   The history is provided by the patient. This is a new problem. The current episode started more than 1 week ago. The problem occurs constantly. The problem has not changed since onset. The cough is productive of sputum. There has been no fever. Associated symptoms include rhinorrhea. Pt has sleep apnea and needs to f/u with DME ? [employer] about his compiance and why insurance is not paying as well as CPAP  since he has difficulty cleaning it on his own and wonders if it is causing his sinus problems. Patient's blood pressure has been elevated over the last few months. He will try to control it with diet and weight loss. He will follow-up in a few months to see how it is improving. Review of Systems   HENT: Positive for rhinorrhea. Physical Exam   Constitutional: He appears well-developed. HENT:   Right Ear: Tympanic membrane and ear canal normal.   Left Ear: Tympanic membrane and ear canal normal.   Nose: Mucosal edema present. Mouth/Throat: Uvula is midline, oropharynx is clear and moist and mucous membranes are normal.   Cardiovascular: Normal rate, regular rhythm and normal heart sounds. Pulmonary/Chest: Effort normal and breath sounds normal.   Vitals reviewed. Visit Vitals  BP (!) 159/98 (BP 1 Location: Left arm, BP Patient Position: Sitting)   Pulse 83   Temp 98.6 °F (37 °C) (Oral)   Resp 20   Ht 5' 9\" (1.753 m)   Wt 244 lb (110.7 kg)   SpO2 97%   BMI 36.03 kg/m²         ASSESSMENT and PLAN    ICD-10-CM ICD-9-CM    1. Acute non-recurrent frontal sinusitis J01.10 461.1 Will treat with azithromycin (ZITHROMAX) 250 mg tablet      montelukast (SINGULAIR) 10 mg tablet   2. Cough R05 786.2 Treat with guaiFENesin-codeine (CHERATUSSIN AC) 100-10 mg/5 mL solution   3. Vitamin D deficiency E55.9 268.9 ergocalciferol (ERGOCALCIFEROL) 50,000 unit capsule   4.  Elevated blood pressure reading R03.0 796.2  patient will try and control this with diet and weight loss. 5. Obstructive sleep apnea syndrome G47.33 327.23  patient to follow-up with his DME company to make sure that he is being compliant with using the CPAP machine and see if he can get CPAP . Follow-up Disposition:  Return in about 3 months (around 4/7/2019) for BP check. Reviewed plan of care. Patient has provided input and agrees with goals.

## 2019-01-07 NOTE — TELEPHONE ENCOUNTER
Please call and notify pt that his CPAP supplies and new CPAP machine is to be provided by his sleep medicine physician.

## 2019-01-08 NOTE — TELEPHONE ENCOUNTER
Pt could not remember who he saw. Looked up referral & Pt was referred to Dr Candi Toth. Pt provided with phone number.

## 2019-01-17 ENCOUNTER — OFFICE VISIT (OUTPATIENT)
Dept: ORTHOPEDIC SURGERY | Age: 34
End: 2019-01-17

## 2019-01-17 VITALS
DIASTOLIC BLOOD PRESSURE: 92 MMHG | OXYGEN SATURATION: 99 % | HEART RATE: 67 BPM | WEIGHT: 240.6 LBS | TEMPERATURE: 96.6 F | HEIGHT: 69 IN | SYSTOLIC BLOOD PRESSURE: 142 MMHG | BODY MASS INDEX: 35.63 KG/M2 | RESPIRATION RATE: 16 BRPM

## 2019-01-17 DIAGNOSIS — S83.402D SPRAIN OF COLLATERAL LIGAMENT OF LEFT KNEE, SUBSEQUENT ENCOUNTER: Primary | ICD-10-CM

## 2019-01-17 DIAGNOSIS — S93.492D HIGH ANKLE SPRAIN, LEFT, SUBSEQUENT ENCOUNTER: ICD-10-CM

## 2019-01-17 NOTE — PROGRESS NOTES
Kaye Luke  1985   Chief Complaint   Patient presents with    Knee Pain     LEFT KNEE PAIN    Ankle Pain     LEFT ANKLE PAIN      HISTORY OF PRESENT ILLNESS  Kaye Luke is a 34 y.o. male who presents today for reevaluation of left knee and foot pain. Patient rates pain as 2/10 today. He injured his left knee while stepping in his cat's throw up on 10/23/18. He reports coming down and striking his left ankle and left knee. Still some pain in the ankle. He states he has improved since last OV but still some discomfort in the knee and ankle. He doesn't feel confident he can run without pain. He has only attending 1 session of PT due to the cost. He has been doing dispatch at work. Patient denies any fever, chills, chest pain, shortness of breath or calf pain. The remainder of the review of systems is negative. There are no new illness or injuries to report since last seen in the office. There are no changes to medications, allergies, family or social history. PHYSICAL EXAM:   Visit Vitals  BP (!) 142/92   Pulse 67   Temp 96.6 °F (35.9 °C) (Oral)   Resp 16   Ht 5' 9\" (1.753 m)   Wt 240 lb 9.6 oz (109.1 kg)   SpO2 99%   BMI 35.53 kg/m²     The patient is a well-developed, well-nourished male   in no acute distress. The patient is alert and oriented times three. The patient is alert and oriented times three. Mood and affect are normal.  LYMPHATIC: lymph nodes are not enlarged and are within normal limits  SKIN: normal in color and non tender to palpation. There are no bruises or abrasions noted. NEUROLOGICAL: Motor sensory exam is within normal limits. Reflexes are equal bilaterally.  There is normal sensation to pinprick and light touch  MUSCULOSKELETAL:  Examination Left Ankle/Foot   Skin Intact   Swelling +   Dorsiflexion -5   Plantarflexion 20   Deformity -   Inversion laxity -   Anterior drawer -   Medial malleolus tenderness -   Lateral malleolus tenderness -   Heel cord Intact   Heel cord Tightness -   Roger's Test -   Kenyetta's Test -   Sensation Intact   Bunion -   Capillary refill Normal      Examination Left knee   Skin Intact   Range of motion 0-130   Effusion -   Medial joint line tenderness -   Lateral joint line tenderness -   Tenderness Pes Bursa -   Tenderness insertion MCL +   Tenderness insertion LCL -   Abas -   Patella crepitus -   Patella grind -   Lachman -   Pivot shift -   Anterior drawer -   Posterior drawer -   Varus stress -   Valgus stress -   Neurovascular Intact   Calf Swelling and Tenderness to Palpation -   Kenyetta's Test -   Hamstring Cord Tightness -     IMAGING: MRI of left knee dated 11/26/18 was reviewed and read: IMPRESSION:  1.  Grade 2 injury of the proximal tibial collateral ligament with surrounding edema, as above. 2.  Small Baker cyst.  3.  Intact menisci, cruciate, and collateral ligaments. 4.  Grade 1/2 chondrosis in the lateral tibial plateau and grade 1 chondrosis in the patellofemoral compartment as above. 5.  Small likely ganglion or synovial cyst adjacent to the PCL insertion.     MRI of left ankle dated 11/26/18 was reviewed and read: IMPRESSION:  1.  Grade 2/3 injury of the anterior inferior tibiofibular ligament and grade 1/2 injury of the posterior inferior tibiofibular ligament (high ankle sprain). Indistinctness of the calcaneofibular ligament, likely reflecting grade 1/2 injury. Intact anterior and posterior talofibular ligaments. 2.  Small moderate-sized ankle joint effusion. 3.  No evidence of fracture or osteonecrosis. 4.  Small multilobulated T2 hyperintense structures as described above, favored to represent synovial or ganglion cysts. 5.  Mild chronic plantar fasciitis.     left ankle dated 10/23/18 was reviewed and read: IMPRESSION: No acute bony injuries.     XR LEFT KNEE dated 10/23/18 was reviewed and read: IMPRESSION:  No acute bony injuries or joint effusion. IMPRESSION:      ICD-10-CM ICD-9-CM    1. Sprain of collateral ligament of left knee, subsequent encounter S83.402D V58.89 AMB SUPPLY ORDER     844.1    2. High ankle sprain, left, subsequent encounter S93.432D V58.89      845.03         PLAN:   1. The patient presents today with left ankle and knee pain which has improved since last OV, but still some lingering pain. I would like him use an MCL brace. Provided with a work note today. 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 Risk factors include: n/a  2. No ultrasound exam indicated today  3. No cortisone injection indicated today   4. No Physical/Occupational Therapy indicated today  5. No diagnostic test indicated today   6. Yes durable medical equipment indicated today MCL BRACE L  7. No referral indicated today   8. No medications indicated today:   9. No Narcotic indicated today      RTC 6 weeks  Follow-up Disposition: Not on File    Scribed by Ron Bronson [address]) as dictated by Joceline Antonio MD    I, Dr. Joceline Antonio, confirm that all documentation is accurate.     Joceline Antonio M.D.   [facility] Specialist

## 2019-01-17 NOTE — LETTER
NOTIFICATION RETURN TO WORK / SCHOOL 
 
1/17/2019 8:46 AM 
 
Mr. Jesus Perez [facility] [address] To Whom It May Concern: 
 
Jesus Perez is currently under the care of [address] [place], [place] [address]kyara Jean. He will be on light duty x 6 weeks. If there are questions or concerns please have the patient contact our office. Sincerely, Arvis Spatz, MD

## 2019-01-17 NOTE — PROGRESS NOTES
1. Have you been to the ER, urgent care clinic since your last visit? Hospitalized since your last visit? No    2. Have you seen or consulted any other health care providers outside of the [address] [place] [address] since your last visit? Include any pap smears or colon screening.  No

## 2019-02-28 ENCOUNTER — OFFICE VISIT (OUTPATIENT)
Dept: ORTHOPEDIC SURGERY | Age: 34
End: 2019-02-28

## 2019-02-28 VITALS
RESPIRATION RATE: 16 BRPM | SYSTOLIC BLOOD PRESSURE: 147 MMHG | TEMPERATURE: 98.6 F | BODY MASS INDEX: 35.55 KG/M2 | HEART RATE: 66 BPM | OXYGEN SATURATION: 100 % | DIASTOLIC BLOOD PRESSURE: 88 MMHG | WEIGHT: 240 LBS | HEIGHT: 69 IN

## 2019-02-28 DIAGNOSIS — S93.492D HIGH ANKLE SPRAIN, LEFT, SUBSEQUENT ENCOUNTER: ICD-10-CM

## 2019-02-28 DIAGNOSIS — S83.402D SPRAIN OF COLLATERAL LIGAMENT OF LEFT KNEE, SUBSEQUENT ENCOUNTER: Primary | ICD-10-CM

## 2019-02-28 NOTE — PROGRESS NOTES
Beverly Lerner  1985   Chief Complaint   Patient presents with    Knee Pain     LEFT KNEE PAIN      HISTORY OF PRESENT ILLNESS  Beverly Lerner is a 34 y.o. male who presents today for reevaluation of left knee and foot pain. Patient rates pain as 4/10 today. He injured his left knee while stepping in his cat's throw up on 10/23/18. He reports coming down and striking his left ankle and left knee. He reports persistent discomfort in his ankle. He doesn't feel confident he can run without pain. He has only attending 1 session of PT due to the cost. He has been doing dispatch at work. Patient denies any fever, chills, chest pain, shortness of breath or calf pain. The remainder of the review of systems is negative. There are no new illness or injuries to report since last seen in the office. There are no changes to medications, allergies, family or social history. PHYSICAL EXAM:   Visit Vitals  /88   Pulse 66   Temp 98.6 °F (37 °C) (Oral)   Resp 16   Ht 5' 9\" (1.753 m)   Wt 240 lb (108.9 kg)   SpO2 100%   BMI 35.44 kg/m²     The patient is a well-developed, well-nourished male   in no acute distress. The patient is alert and oriented times three. The patient is alert and oriented times three. Mood and affect are normal.  LYMPHATIC: lymph nodes are not enlarged and are within normal limits  SKIN: normal in color and non tender to palpation. There are no bruises or abrasions noted. NEUROLOGICAL: Motor sensory exam is within normal limits. Reflexes are equal bilaterally.  There is normal sensation to pinprick and light touch  MUSCULOSKELETAL:  Examination Left Ankle/Foot   Skin Intact   Swelling +   Dorsiflexion -5   Plantarflexion 20   Deformity -   Inversion laxity -   Anterior drawer -   Medial malleolus tenderness -   Lateral malleolus tenderness -   Heel cord Intact   Heel cord Tightness -   Roger's Test -   Kenyetta's Test -   Sensation Intact   Bunion -   Capillary refill Normal      Examination Left knee   Skin Intact   Range of motion 0-130   Effusion -   Medial joint line tenderness -   Lateral joint line tenderness -   Tenderness Pes Bursa -   Tenderness insertion MCL +   Tenderness insertion LCL -   Abas -   Patella crepitus -   Patella grind -   Lachman -   Pivot shift -   Anterior drawer -   Posterior drawer -   Varus stress -   Valgus stress -   Neurovascular Intact   Calf Swelling and Tenderness to Palpation -   Kenyetta's Test -   Hamstring Cord Tightness -     IMAGING: MRI of left knee dated 11/26/18 was reviewed and read: IMPRESSION:  1.  Grade 2 injury of the proximal tibial collateral ligament with surrounding edema, as above. 2.  Small Baker cyst.  3.  Intact menisci, cruciate, and collateral ligaments. 4.  Grade 1/2 chondrosis in the lateral tibial plateau and grade 1 chondrosis in the patellofemoral compartment as above. 5.  Small likely ganglion or synovial cyst adjacent to the PCL insertion.     MRI of left ankle dated 11/26/18 was reviewed and read: IMPRESSION:  1.  Grade 2/3 injury of the anterior inferior tibiofibular ligament and grade 1/2 injury of the posterior inferior tibiofibular ligament (high ankle sprain). Indistinctness of the calcaneofibular ligament, likely reflecting grade 1/2 injury. Intact anterior and posterior talofibular ligaments. 2.  Small moderate-sized ankle joint effusion. 3.  No evidence of fracture or osteonecrosis. 4.  Small multilobulated T2 hyperintense structures as described above, favored to represent synovial or ganglion cysts. 5.  Mild chronic plantar fasciitis.     left ankle dated 10/23/18 was reviewed and read: IMPRESSION: No acute bony injuries.     XR LEFT KNEE dated 10/23/18 was reviewed and read: IMPRESSION:  No acute bony injuries or joint effusion. IMPRESSION:      ICD-10-CM ICD-9-CM    1. Sprain of collateral ligament of left knee, subsequent encounter S83.402D V58.89      844.1    2.  High ankle sprain, left, subsequent encounter S93.432D V58.89      845.03         PLAN:   1. The patient presents today with persistent left ankle and knee pain. I would like him to follow up with Dr. Anne Mosley in regards to the ankle and continue wearing the knee brace for 3 weeks. Risk factors include: n/a  2. No ultrasound exam indicated today  3. No cortisone injection indicated today   4. No Physical/Occupational Therapy indicated today  5. No diagnostic test indicated today   6. No durable medical equipment indicated today   7. Yes referral indicated today DR. HINES  8. No medications indicated today:   9. No Narcotic indicated today      RTC 3 weeks  Follow-up Disposition: Not on File    Scribed by Lois Rios[address]) as dictated by Mckay Paniagua MD    I, Dr. Mckay Paniagua, confirm that all documentation is accurate.     Mckay Paniagua M.D.   [facility]

## 2019-02-28 NOTE — PATIENT INSTRUCTIONS
Dr. Piper Espinal Knee Arthroscopy Surgery    What is the surgery? - This is an outpatient procedure at either [facility] [address] or [address] will be completely asleep for procedure. Dr. Piper Espinal will make 2 small incisions in your knee. He will take a tour of your knee with the camera and then address the meniscal tear(s). We will be able to evaluate if any arthritis in your knee but this surgery is not to treat your arthritis. - Total surgery takes about 25-30 mins     What can you expect after surgery? - You will have a bulky dressing on your knee that you can remove 2 days after surgery. You will be able to shower 2 days after surgery but no soaking in a bath, hot tub, ocean or pool x 2 weeks to allow for full wound healing. No special brace is needed. - You will be on crutches or a walker when you leave the hospital. You can place weight on your leg as tolerated starting immediately. You are usually on crutches or your walker for about 4-5 days.   - Even though you can place weight on your leg, we recommend no walking or standing longer than 10mins for the first week. We will gradually increase your activities after that point.    - Dr. Piper Espinal will start physical therapy for you when you return for your 1 week post op apt  - It will take your 6-8 weeks to fully recover from your surgery. When can I return to work? 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 We recommend no prolonged walking or standing, climbing, kneeling, or crawling x 6-8 weeks. Not all knee arthroscopies are the same. The specifics of your individual case will be discussed at length with you by Dr. Piper Espinal and his Physician Assistant. Jovan Batres  Surgical Coordinator  [address].  [address], [facility] Ligia  [email or web address]  P: [phone]  F: [phone]

## 2019-02-28 NOTE — PROGRESS NOTES
1. Have you been to the ER, urgent care clinic since your last visit? Hospitalized since your last visit? No    2. Have you seen or consulted any other health care providers outside of the [address] [place] [address] since your last visit? Include any pap smears or colon screening.  No

## 2019-03-19 ENCOUNTER — OFFICE VISIT (OUTPATIENT)
Dept: ORTHOPEDIC SURGERY | Age: 34
End: 2019-03-19

## 2019-03-19 VITALS
SYSTOLIC BLOOD PRESSURE: 147 MMHG | HEART RATE: 74 BPM | WEIGHT: 240 LBS | TEMPERATURE: 95.5 F | RESPIRATION RATE: 16 BRPM | OXYGEN SATURATION: 98 % | DIASTOLIC BLOOD PRESSURE: 88 MMHG | HEIGHT: 69 IN | BODY MASS INDEX: 35.55 KG/M2

## 2019-03-19 DIAGNOSIS — M25.572 ACUTE LEFT ANKLE PAIN: Primary | ICD-10-CM

## 2019-03-19 DIAGNOSIS — Z01.89 ENCOUNTER FOR LOWER EXTREMITY COMPARISON IMAGING STUDY: ICD-10-CM

## 2019-03-19 NOTE — PROGRESS NOTES
AMBULATORY PROGRESS NOTE      Patient: Babak Sanchez             MRN: [id number]     SSN: [id number] Body mass index is 35.44 kg/m². YOB: 1985     AGE: 34 y.o. EX: male    PCP: Art Murillo DO     IMPRESSION/DIAGNOSIS AND TREATMENT PLAN     DIAGNOSES  1. Acute left ankle pain    2. Encounter for lower extremity comparison imaging study        Orders Placed This Encounter    [23719] Ankle Min 3V    [[id number]] Ankle Min 3V    CT ANKLE LT WO CONT      Babak Sanchez understands his diagnoses and the proposed plan. In this individual who continues to have pain in the ankle, although he is improved, he had some weightbearing films of his ankle, and the weightbearing films of his left ankle, three views, with comparison right ankle films, in my interpretation, it looks like there may be more slight diastasis to the anterior inferior tib/fib region. When I externally rotate him, he has some mild discomfort anterolaterally and medially. There is no gross instability when performing anterior drawer and there is no gross clicking or popping when I do a varus stress test.  Recommendations are listed as below including a CT scan of the left ankle. If he is not any better, he may require an arthroscopy of the ankle to assess the syndesmosis and perform a stress test at the ankle. It shows dynamic instability of the syndesmosis, he may require syndesmosis repair or reconstruction, open incision cleaning out the syndesmotic area, clamping across here to make sure it is properly reduced and securing it with a tightrope type system. He is going to follow up with Dr. Ania Fuentes for his knee. Plan:    1) CT scan without IV Contrast of the left ankle. 2) Continue activity modification as directed. 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  He is still being worked up for a definitive treatment plan: possible custom brace, possible surgery on the left ankle, possible stress test, and a pending CT scan. RTO - after CT scan     HPI AND EXAMINATION     Jorge Prince IS A 34 y.o. male who presents to my outpatient office complaining of left foot pain. Mr. Chintan Hicks reports that he injured his left knee and ankle at the end of October. He recalls that he fell down a stairwell and that his left leg went behind him as he fell down. He saw Dr. Juliano Rhoades for this injury, who diagnosed the patient with a left knee and ankle sprain. He was placed in PT, however, he had to discontinue sessions due to financial reasons. He notes that since his injury, he has been experiencing weakness in his knee and sharp pains and discomfort in both his knee and his ankle. He denies any other injuries other than the initial injury in October 2018. He describes his pain today as dull, and rates it at a 2 out of 10, however, he notes that his pain does get worse than this. Mr. Chintan Hicks reports that he does experience some catching and locking of his ankle at times. He states that when he gets out of the car, he has to step out with one leg, then with the other, as opposed to stepping out and rotating, as he used to. He notes that he has been modifying this due to his knee pain. He presents to the office today with a brace on his left knee. Date of injury: 10/23/2018. 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 Visit Vitals  /88   Pulse 74   Temp 95.5 °F (35.3 °C) (Oral)   Resp 16   Ht 5' 9\" (1.753 m)   Wt 240 lb (108.9 kg)   SpO2 98%   BMI 35.44 kg/m²     Appearance: Alert, well appearing and pleasant patient who is in no distress, oriented to person, place/time, and who follows commands. This patient is accompanied in the examination room by his self. Dementia: no dementia  Psychiatric: Affect and mood are appropriate. Patient arrives to office via: with assistive device: knee brace.    HEENT: Head normocephalic & atraumatic. Both pupils are round, non icteric sclera   Eye: EOM are intact and sclera are clear    Neck: ROM WNL and JVD neck is not present     Hearings Intact, does not require hearing aid device  Respiratory: Breathing is unlabored without accessory chest muscle use  Cardiovascular/Peripheral Vascular: Normal Pulses to each hand and foot    ANKLE/FOOT left    Gait: Normal  Tenderness: Mild tenderness laterally with external rotation/stress test.   Cutaneous: Mild swelling to the anterolateral ankle. Joint Motion: Full PF, inversion, and eversion    Does not fully DF  Joint / Tendon Stability: 1+ anterior drawer, with pain                      No peroneal sublux ability or dislocation  Alignment: Forefoot, Midfoot, Hindfoot WNL. Neuro Motor/Sensory: NL/NL. Vascular: NL foot/ankle pulses. Lymphatics: No extremity lymphedema, No calf swelling, no tenderness to calf muscles. ANKLE/FOOT right    Tenderness: No tenderness to palpation. Cutaneous:  WNL. Joint Motion: WNL. Joint / Tendon Stability: 1+ anterior drawer with good end point. No peroneal sublux ability or dislocation  Alignment: Forefoot, Midfoot, Hindfoot WNL. Neuro Motor/Sensory: NL/NL. Vascular: NL foot/ankle pulses. Lymphatics: No extremity lymphedema, No calf swelling, no tenderness to calf muscles. CHART REVIEW     Past Medical History:   Diagnosis Date    Condyloma acuminatum     Environmental allergies     Groin pain     Plantar fasciitis     Tinea pedis      Current Outpatient Medications   Medication Sig    valACYclovir (VALTREX) 500 mg tablet Take  by mouth two (2) times a day.  ergocalciferol (ERGOCALCIFEROL) 50,000 unit capsule Take 1 Cap by mouth every seven (7) days.  guaiFENesin-codeine (CHERATUSSIN AC) 100-10 mg/5 mL solution Take 10 mL by mouth four (4) times daily as needed for Cough. Max Daily Amount: 40 mL.  valACYclovir (VALTREX) 1 gram tablet Take 1 Tab by mouth daily.     montelukast (SINGULAIR) 10 mg tablet Take 1 Tab by mouth daily.  ibuprofen (MOTRIN) 600 mg tablet Take 1 Tab by mouth every six (6) hours as needed for Pain. No current facility-administered medications for this visit. Allergies   Allergen Reactions    Penicillins Rash, Swelling and Hives    Mold Extracts Rash    Peanut Rash and Swelling     Past Surgical History:   Procedure Laterality Date    HX OTHER SURGICAL      dental     Social History     Occupational History    Not on file   Tobacco Use    Smoking status: Never Smoker    Smokeless tobacco: Never Used   Substance and Sexual Activity    Alcohol use: No    Drug use: No    Sexual activity: Not on file     Family History   Problem Relation Age of Onset    Hypertension Mother     Diabetes Mother     Hypertension Father         REVIEW OF SYSTEMS : 3/19/2019  ALL BELOW ARE Negative except : SEE HPI     Constitutional: Negative for fever, chills and weight loss. Neg Weight Loss  Cardiovascular: Negative for chest pain, claudication and leg swelling. SOB, HURD   Gastrointestinal/Urological: Negative for  pain, N/V/D/C, Blood in stool or urine,dysuria                         Hematuria, Incontinence, pelvic pain  Musculoskeletal: see HPI. Neurological: Negative for dizziness and weakness, headaches,Visual Changes             Confusion,  Or Seizures,   Psychiatric/Behavioral: Negative for depression, memory loss and substance abuse. Extremities:  Negative for hair changes, rash or skin lesion changes. Hematologic: Negative for Bleeding problems, bruising, pallor or swollen lymph nodes.   Peripheral Vascular: No calf pain, vascular vein tenderness to calf pain              No calf throbbing, posterior knee throbbing pain     DIAGNOSTIC IMAGING     No notes on file     Exam Information     Status Exam Begun  Exam Ended    Final [99] 11/26/2018 06:30 11/26/2018 07:07   Result Information     Status: Final result (Exam End: 11/26/2018 07:07) Provider Status: Reviewed   Study Result     Procedure: MRI of the left ankle without contrast.     History/Indications: 34 years Male. Ankle trauma, continued pain >1wk, first  xray negative. Additional History: Fall from stairs. Unable to bear weight.     Technique: Multisequence multiplanar MR imaging of the left ankle      Comparison: Left ankle radiograph 10/23/2018.     Findings:     OSSEOUS STRUCTURES/JOINTS:  Moderate sized tibiotalar joint effusion is present. No evidence of  osteochondral lesion of the talar dome. No evidence of high-grade chondrosis. No  significant degenerative change appreciated.     Mild marrow edema is noted within the posterior aspect of the tibial plafond.     TENDONS:  Medial flexors: Unremarkable  Peroneal tendons: Unremarkable  Anterior extensor tendons: Unremarkable  Achilles: Unremarkable        LIGAMENTS:  Lateral ligaments: There is indistinctness, intermediate signal, and thickening  within the anterior inferior tibiofibular ligament compatible with grade 2/3  injury. There is mild marrow edema within the posterior aspect of the tibial  plafond near the posterior inferior tibiofibular ligament attachment. The  posterior inferior tibiofibular ligament is indistinct in appearance, likely  reflecting grade 1/2 injury. The anterior and posterior talofibular ligaments  are intact. The calcaneofibular ligament is indistinct in appearance. Deltoid ligamentous complex: Unremarkable  Other:  The Lisfranc ligamentous complex is suboptimally evaluated due to exam  protocol under no gross abnormality is appreciated.     PLANTAR FASCIA:  Mild thickening and intermediate signal within the central cord of the plantar  fascia, compatible with chronic plantar fasciitis.     MUSCULATURE:  Unremarkable     TARSAL TUNNEL:  Unremarkable     OTHER:  There is a 6 mm multilobulated T2 hyperintense structure at the lower aspect of  the first tarsometatarsal joint, likely reflecting a synovial or ganglion cyst.  There is an additional multilobulated T2 hyperintense structure measuring 5 mm  at the volar aspect of the navicular bone, favored to represent a synovial or  ganglion cyst.        IMPRESSION  IMPRESSION[de-identified]    1.  Grade 2/3 injury of the anterior inferior tibiofibular ligament and grade  1/2 injury of the posterior inferior tibiofibular ligament (high ankle sprain). Indistinctness of the calcaneofibular ligament, likely reflecting grade 1/2  injury. Intact anterior and posterior talofibular ligaments. 2.  Small moderate-sized ankle joint effusion. 3.  No evidence of fracture or osteonecrosis. 4.  Small multilobulated T2 hyperintense structures as described above, favored  to represent synovial or ganglion cysts. 5.  Mild chronic plantar fasciitis.     Thank you for this referral.      Please see above section of this report. I have personally reviewed the results of the above study. The interpretation of this study is my professional opinion. Written by Ade Horn, as dictated by Dr. Seda Vinson. I, Dr. Seda Vinson, confirm that all documentation is accurate.

## 2019-03-19 NOTE — PATIENT INSTRUCTIONS
Please follow up after CT. You are advised to contact us if your condition worsens. An MRI or CT has been ordered for you. A [employer] will be contacting you to schedule the appointment as soon as it has been approved with your insurance company. Please schedule an appointment to follow up with the doctor or the physicians assistant after the MRI or CT has been conducted. Ankle Sprain: Care Instructions  Your Care Instructions    An ankle sprain can happen when you twist your ankle. The ligaments that support the ankle can get stretched and torn. Often the ankle is swollen and painful. Ankle sprains may take from several weeks to several months to heal. Usually, the more pain and swelling you have, the more severe your ankle sprain is and the longer it will take to heal. You can heal faster and regain strength in your ankle with good home treatment. It is very important to give your ankle time to heal completely, so that you do not easily hurt your ankle again. Follow-up care is a key part of your treatment and safety. Be sure to make and go to all appointments, and call your doctor if you are having problems. It's also a good idea to know your test results and keep a list of the medicines you take. How can you care for yourself at home? · Prop up your foot on pillows as much as possible for the next 3 days. Try to keep your ankle above the level of your heart. This will help reduce the swelling. · Follow your doctor's directions for wearing a splint or elastic bandage. Wrapping the ankle may help reduce or prevent swelling. · Your doctor may give you a splint, a brace, an air stirrup, or another form of ankle support to protect your ankle until it is healed. Wear it as directed while your ankle is healing. Do not remove it unless your doctor tells you to. After your ankle has healed, ask your doctor whether you should wear the brace when you exercise.   · Put ice or cold packs on your injured ankle for 10 to 20 minutes at a time. Try to do this every 1 to 2 hours for the next 3 days (when you are awake) or until the swelling goes down. Put a thin cloth between the ice and your skin. · You may need to use crutches until you can walk without pain. If you do use crutches, try to bear some weight on your injured ankle if you can do so without pain. This helps the ankle heal.  · Take pain medicines exactly as directed. ? If the doctor gave you a prescription medicine for pain, take it as prescribed. ? If you are not taking a prescription pain medicine, ask your doctor if you can take an over-the-counter medicine. · If you have been given ankle exercises to do at home, do them exactly as instructed. These can promote healing and help prevent lasting weakness. When should you call for help? Call your doctor now or seek immediate medical care if:    · Your pain is getting worse.     · Your swelling is getting worse.     · Your splint feels too tight or you are unable to loosen it.    Watch closely for changes in your health, and be sure to contact your doctor if:    · You are not getting better after 1 week. Where can you learn more? Go to http://paulina-cale.info/. Enter O027 in the search box to learn more about \"Ankle Sprain: Care Instructions. \"  Current as of: September 20, 2018  Content Version: 11.9  © 8724-6238 [employer]. Care instructions adapted under license by [employer] (which disclaims liability or warranty for this information). If you have questions about a medical condition or this instruction, always ask your healthcare professional. [address] any warranty or liability for your use of this information.

## 2019-03-19 NOTE — LETTER
NOTIFICATION RETURN TO WORK / SCHOOL 
 
3/19/2019 8:26 AM 
 
Mr. Kaye Luke [facility] [id number] To Whom It May Concern: 
 
Kaye Luke is currently under the care of [address] [place] [address] Francisco Jean. 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 He is still being worked up for a definitive treatment plan: possible custom brace, possible surgery on the left ankle, possible stress test, and a pending CT scan. If there are questions or concerns please have the patient contact our office.  
 
 
 
Sincerely, 
 
 
Huber Lomax MD

## 2019-03-27 ENCOUNTER — HOSPITAL ENCOUNTER (OUTPATIENT)
Dept: CT IMAGING | Age: 34
Discharge: HOME | End: 2019-03-27
Attending: ORTHOPAEDIC SURGERY
Payer: COMMERCIAL

## 2019-03-27 DIAGNOSIS — M25.572 ACUTE LEFT ANKLE PAIN: ICD-10-CM

## 2019-03-27 PROCEDURE — 73700 CT LOWER EXTREMITY W/O DYE: CPT

## 2019-04-15 ENCOUNTER — OFFICE VISIT (OUTPATIENT)
Dept: ORTHOPEDIC SURGERY | Age: 34
End: 2019-04-15

## 2019-04-15 VITALS
SYSTOLIC BLOOD PRESSURE: 148 MMHG | HEIGHT: 69 IN | RESPIRATION RATE: 16 BRPM | BODY MASS INDEX: 36.73 KG/M2 | DIASTOLIC BLOOD PRESSURE: 93 MMHG | WEIGHT: 248 LBS | TEMPERATURE: 97.4 F | HEART RATE: 75 BPM | OXYGEN SATURATION: 97 %

## 2019-04-15 DIAGNOSIS — M25.572 ACUTE LEFT ANKLE PAIN: ICD-10-CM

## 2019-04-15 DIAGNOSIS — S93.492D SPRAIN OF ANTERIOR TALOFIBULAR LIGAMENT OF LEFT ANKLE, SUBSEQUENT ENCOUNTER: Primary | ICD-10-CM

## 2019-04-15 NOTE — PATIENT INSTRUCTIONS
Ankle Sprain: Care Instructions  Your Care Instructions    An ankle sprain can happen when you twist your ankle. The ligaments that support the ankle can get stretched and torn. Often the ankle is swollen and painful. Ankle sprains may take from several weeks to several months to heal. Usually, the more pain and swelling you have, the more severe your ankle sprain is and the longer it will take to heal. You can heal faster and regain strength in your ankle with good home treatment. It is very important to give your ankle time to heal completely, so that you do not easily hurt your ankle again. Follow-up care is a key part of your treatment and safety. Be sure to make and go to all appointments, and call your doctor if you are having problems. It's also a good idea to know your test results and keep a list of the medicines you take. How can you care for yourself at home? · Prop up your foot on pillows as much as possible for the next 3 days. Try to keep your ankle above the level of your heart. This will help reduce the swelling. · Follow your doctor's directions for wearing a splint or elastic bandage. Wrapping the ankle may help reduce or prevent swelling. · Your doctor may give you a splint, a brace, an air stirrup, or another form of ankle support to protect your ankle until it is healed. Wear it as directed while your ankle is healing. Do not remove it unless your doctor tells you to. After your ankle has healed, ask your doctor whether you should wear the brace when you exercise. · Put ice or cold packs on your injured ankle for 10 to 20 minutes at a time. Try to do this every 1 to 2 hours for the next 3 days (when you are awake) or until the swelling goes down. Put a thin cloth between the ice and your skin. · You may need to use crutches until you can walk without pain. If you do use crutches, try to bear some weight on your injured ankle if you can do so without pain.  This helps the ankle heal.  · Take pain medicines exactly as directed. ? If the doctor gave you a prescription medicine for pain, take it as prescribed. ? If you are not taking a prescription pain medicine, ask your doctor if you can take an over-the-counter medicine. · If you have been given ankle exercises to do at home, do them exactly as instructed. These can promote healing and help prevent lasting weakness. When should you call for help? Call your doctor now or seek immediate medical care if:    · Your pain is getting worse.     · Your swelling is getting worse.     · Your splint feels too tight or you are unable to loosen it.    Watch closely for changes in your health, and be sure to contact your doctor if:    · You are not getting better after 1 week. Where can you learn more? Go to http://paulina-cale.info/. Enter E317 in the search box to learn more about \"Ankle Sprain: Care Instructions. \"  Current as of: September 20, 2018  Content Version: 11.9  © 7624-4074 [employer]. Care instructions adapted under license by [employer] (which disclaims liability or warranty for this information). If you have questions about a medical condition or this instruction, always ask your healthcare professional. [address] any warranty or liability for your use of this information. Ankle Sprain: Exercises      Complete at least 2 sessions of each exercise each day to get started (no days off). We suggest one session in the morning before work and one session in the evening before bed. If beneficial and able to fit into your schedule, more sessions are recommended. Nothing should cause an increase in pain or swelling. \"Alphabet\" exercise    1. Trace the alphabet with your big toe. This helps your ankle move in all directions and decrease swelling. For best results, perform with ankle elevated. 2. Use capital, lowercase, cursive, or whatever letters you prefer. You can also do ankle circles, going as far as you can in every direction. The point is to move the ankle! 3. Complete at least 5 rounds of the alphabet or 5 minutes timed. Towel curl    1. While sitting, place your foot on a towel on the floor. Scrunch the towel toward you with your toes. 2. Then use your toes to push the towel away from you. 3. To make this exercise more challenging you can put something on the other end of the towel. A can of soup is about the right weight for this. 4. Complete 10 repetitions. Towel stretch (calf)    1. Sit with your legs extended and knees straight. 2. Place a towel around your foot just under the toes. 3. Hold each end of the towel in each hand, with your hands above your knees. 4. Pull back with the towel so that your foot stretches toward you. 5. Hold the position for 30 seconds. 6. Repeat 3 times a session. 7. When 3 sets are completed, slightly bend the knee by placing a towel or pillow under it and completing 3 more sets per leg. Calf wall stretch    4. Stand facing a wall with your hands on the wall at about eye level. Put your affected leg about a step behind your other leg. 5. Keeping your back leg straight and your back heel on the floor, bend your front knee and gently bring your hip and chest toward the wall until you feel a stretch in the calf of your back leg. 6. Hold the stretch for 30 seconds. 7. Repeat 3 times per leg. 8. Repeat steps 1 through 4, but this time keep your back knee bent. Resisted ankle inversion    1. Sit on the floor with your good leg crossed over your other leg. 2. Hold both ends of an exercise band and loop the band around the inside of your affected foot. Then press your other foot against the band. 3. Keeping your legs crossed, slowly push your affected foot against the band so that foot moves away from your other foot. Then slowly relax. 4. Repeat 10 times. Complete 3 sets.       Resisted ankle eversion    1. Sit on the floor with your legs straight. 2. Hold both ends of an exercise band and loop the band around the outside of your affected foot. Then press your other foot against the band. 3. Keeping your leg straight, slowly push your affected foot outward against the band and away from your other foot without letting your leg rotate. Then slowly relax. 4. Repeat 10 times. Complete 3 sets. Resisted ankle dorsiflexion    1. Tie the ends of an exercise band together to form a loop. Attach one end of the loop to a secure object or shut a door on it to hold it in place. (Or you can have someone hold one end of the loop to provide resistance.)  2. While sitting on the floor or on a couch/bed, loop the other end of the band over the top of your affected foot. 3. Keeping your knee and leg straight, slowly flex your foot to pull back on the exercise band, and then slowly relax. 4. Repeat 10 times. Complete 3 sets. Resisted ankle plantar flexion    1. Sit with your affected leg straight and supported on the floor. Your other leg should be bent, with that foot flat on the floor. 2. Place an elastic band around your affected foot just under the toes. 3. Hold each end of the band in each hand, with your hands above your knees. 4. Keeping your affected leg straight, gently flex your foot downward so your toes are pointed away from your body. Then slowly relax your foot to the starting position. 5. Repeat 10 times per session. Single-leg balance    1. Stand on a flat surface with your arms stretched out to your sides like you are making the letter \"T. \" Then lift your good leg off the floor, bending it at the knee. If you are not steady on your feet, use one hand to hold on to a chair, counter, or wall. 2. Standing on the leg with your affected ankle, keep that knee straight. Try to balance on that leg for up to 30 seconds.   3. Repeat 3 times per leg, alternating legs each time. 4. When you can balance on your affected leg for 30 seconds with your eyes open, try to balance on it with your eyes closed. 5. When you can do this exercise with your eyes closed for 30 seconds and with ease and no pain, try standing on a pillow or piece of foam, and repeat steps 1 through 4.

## 2019-04-15 NOTE — PROGRESS NOTES
1. Have you been to the ER, urgent care clinic since your last visit? Hospitalized since your last visit? Yes, \[facility]\"" ER      2. Have you seen or consulted any other health care providers outside of the [address] [place] [address] since your last visit? Include any pap smears or colon screening. Yes, [facility].  In [place]

## 2019-04-15 NOTE — PROGRESS NOTES
AMBULATORY PROGRESS NOTE      Patient: Sunita Garcia             MRN: [id number]     SSN: [id number] Body mass index is 36.62 kg/m². YOB: 1985     AGE: 34 y.o. EX: male    PCP: Cordell Perez DO     IMPRESSION/DIAGNOSIS AND TREATMENT PLAN     DIAGNOSES  1. Sprain of anterior talofibular ligament of left ankle, subsequent encounter    2. Acute left ankle pain        No orders of the defined types were placed in this encounter. Sunita Garcia understands his diagnoses and the proposed plan. I had a lengthy discussion with him regarding his ankle. He does have evidence of a prior syndesmotic injury, which was confirmed along this prior MRI. He has weightbearing films I got last time, and in my impression, there is a slight widening, maybe about 1 millimeter distally. This nonweightbearing CT scan dated March 27, 2019, confirms findings consistent with a prior high ankle sprain with evidence of a prior injury to the anterior, posterior, inferior tib/fib ligaments with hyperostosis. There is no malalignment on these non-weightbearing, non-stressed images. I had a lengthy discussion with him. Additional recommendations are listed as below. Other options should the discomfort be too intolerable would be arthroscopic assessment of the ankle assessing the syndesmosis arthroscopically and also to perform an external rotation stress test under live fluoro, see whether he opens up anteroinferiorly. If he opens up anteroinferiorly with a stress test, then it would be an open incision/open debridement of the syndesmotic region, possible cadaveric tendon reconstruction of the anterior/inferior tib/fib ligament, possible plate fixation, and an Arthrex fiber tape to recreate the anterior/inferior tib/fib ligamentous complex region to augment this. I spoke to him about the surgery.   He wants to try the brace first.  He continues to see Dr. Blaze Wood for his left knee as well. Plan:    1) DME Order: Left AS/AC brace. 2) Continue activity modification as directed. RTO - 4 weeks     HPI AND EXAMINATION     Lauren Hernandez IS A 34 y.o. male who presents to my outpatient office for follow up of left ankle pain. At the last visit, I ordered a CT scan of the left ankle, provided a work note, and instructed the patient to continue activity modification as directed. Date of injury: 10/23/2018. Since we saw him last, Mr. Gertrude Richard reports that he is still experiencing pain in his left ankle. He rates his pain at a 3 out of 10, but he notes that this is with light activity. 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 He states that the ankle brace does not feel as though it is providing the support he needs for his ankle. He notes that he does experiencing popping and clicking in his ankle. CT results have been reviewed with the patient. He is currently seeing Dr. Felicitas Haji for his left knee. He believes that the knee brace is helping with his knee pain. 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 Visit Vitals  BP (!) 148/93 (BP 1 Location: Left arm, BP Patient Position: Sitting)   Pulse 75   Temp 97.4 °F (36.3 °C) (Oral)   Resp 16   Ht 5' 9\" (1.753 m)   Wt 248 lb (112.5 kg)   SpO2 97%   BMI 36.62 kg/m²     Appearance: Alert, well appearing and pleasant patient who is in no distress, oriented to person, place/time, and who follows commands. This patient is accompanied in the examination room by his self. Dementia: no dementia  Psychiatric: Affect and mood are appropriate. Patient arrives to office via: with assistive device: knee brace. HEENT: Head normocephalic & atraumatic.  Both pupils are round, non icteric sclera              Eye: EOM are intact and sclera are clear               Neck: ROM WNL and JVD neck is not present              Hearings Intact, does not require hearing aid device  Respiratory: Breathing is unlabored without accessory chest muscle use  Cardiovascular/Peripheral Vascular: Normal Pulses to each hand and foot     ANKLE/FOOT left     Gait: Normal  Tenderness: Mild tenderness laterally with external rotation/stress test.   Cutaneous: Mild swelling to the anterolateral ankle. Joint Motion: Full PF, inversion, and eversion                          Does not fully DF  Joint / Tendon Stability: 1+ anterior drawer, with pain                                            No peroneal sublux ability or dislocation  Alignment: Forefoot, Midfoot, Hindfoot WNL. Neuro Motor/Sensory: NL/NL. Vascular: NL foot/ankle pulses. Lymphatics: No extremity lymphedema, No calf swelling, no tenderness to calf muscles.     ANKLE/FOOT right     Tenderness: No tenderness to palpation. Cutaneous:  WNL. Joint Motion: WNL. Joint / Tendon Stability: 1+ anterior drawer with good end point. No peroneal sublux ability or dislocation  Alignment: Forefoot, Midfoot, Hindfoot WNL. Neuro Motor/Sensory: NL/NL. Vascular: NL foot/ankle pulses. Lymphatics: No extremity lymphedema, No calf swelling, no tenderness to calf muscles. CHART REVIEW     Past Medical History:   Diagnosis Date    Condyloma acuminatum     Environmental allergies     Groin pain     Plantar fasciitis     Tinea pedis      Current Outpatient Medications   Medication Sig    valACYclovir (VALTREX) 500 mg tablet Take  by mouth two (2) times a day.  ergocalciferol (ERGOCALCIFEROL) 50,000 unit capsule Take 1 Cap by mouth every seven (7) days.  valACYclovir (VALTREX) 1 gram tablet Take 1 Tab by mouth daily.  montelukast (SINGULAIR) 10 mg tablet Take 1 Tab by mouth daily.  guaiFENesin-codeine (CHERATUSSIN AC) 100-10 mg/5 mL solution Take 10 mL by mouth four (4) times daily as needed for Cough.  Max Daily Amount: 40 mL.  ibuprofen (MOTRIN) 600 mg tablet Take 1 Tab by mouth every six (6) hours as needed for Pain. No current facility-administered medications for this visit. Allergies   Allergen Reactions    Penicillins Rash, Swelling and Hives    Mold Extracts Rash    Peanut Rash and Swelling     Past Surgical History:   Procedure Laterality Date    HX OTHER SURGICAL      dental     Social History     Occupational History    Not on file   Tobacco Use    Smoking status: Never Smoker    Smokeless tobacco: Never Used   Substance and Sexual Activity    Alcohol use: No    Drug use: No    Sexual activity: Not on file     Family History   Problem Relation Age of Onset    Hypertension Mother     Diabetes Mother     Hypertension Father         REVIEW OF SYSTEMS : 4/15/2019  ALL BELOW ARE Negative except : SEE HPI     Constitutional: Negative for fever, chills and weight loss. Neg Weight Loss  Cardiovascular: Negative for chest pain, claudication and leg swelling. SOB, HURD   Gastrointestinal/Urological: Negative for  pain, N/V/D/C, Blood in stool or urine,dysuria                         Hematuria, Incontinence, pelvic pain  Musculoskeletal: see HPI. Neurological: Negative for dizziness and weakness, headaches,Visual Changes             Confusion,  Or Seizures,   Psychiatric/Behavioral: Negative for depression, memory loss and substance abuse. Extremities:  Negative for hair changes, rash or skin lesion changes. Hematologic: Negative for Bleeding problems, bruising, pallor or swollen lymph nodes.   Peripheral Vascular: No calf pain, vascular vein tenderness to calf pain              No calf throbbing, posterior knee throbbing pain     DIAGNOSTIC IMAGING     No notes on file     CT Results (most recent):  Results from Hospital Encounter encounter on 03/27/19   CT ANKLE LT WO CONT    Narrative CT Lower extremity without contrast    Indications: Left ankle CT; pain after falling down staircase    Technique: Contiguous 2.5 mm thickness axial images were obtained through the  left ankle. Sagittal and coronal MPR generated. CT scans at this facility are performed using dose optimization technique as  appropriate with performed exam, to include automated exposure control,  adjustment of mA and/or kV according to patient's size (including appropriate  matching for site-specific examinations), or use of iterative reconstruction  technique. Comparison: Plain films October 2018, MRI November 2018    Findings: Intact bony alignment. No acute fracture. [place] of hyperostosis  along the posterior lateral distal tibia, region of the posterior lateral  ligament complex. As seen on prior MRI. Also mild hyperostosis along the distal anterior fibula, level of the anterior  inferior tibiofibular ligament. Minor hyperostosis more caudad, area of the  anterior talofibular ligament. Tibial plafond and and talar dome as well as subtalar joint surfaces are  unremarkable. Partial bones and TMT joints are unremarkable. Some soft tissue edema overlying the anterolateral ankle. Some thickening of  the anterior talofibular ligament. Irregularity and distortion in the area of the anterior tibiofibular ligament. No focal finding of the deltoid ligament. Regional musculotendinous structures are normally situated, grossly intact. Impression IMPRESSION[de-identified]    1.  Findings consistent with a prior high ankle sprain, with evidence of prior  injury to the anterior and posterior inferior tibiofibular ligaments with  hyperostosis.   -No malalignment   -The soft tissue ligament detail is better delineated on the prior MRI    Thank you for this referral.      Please see above section of this report. I have personally reviewed the results of the above study. The interpretation of this study is my professional opinion. Written by Stalin Santamaria, as dictated by Dr. Kirstie Butcher. I, Dr. Carla Thakkar, confirm that all documentation is accurate.

## 2019-05-13 ENCOUNTER — OFFICE VISIT (OUTPATIENT)
Dept: ORTHOPEDIC SURGERY | Age: 34
End: 2019-05-13

## 2019-05-13 VITALS
WEIGHT: 254 LBS | BODY MASS INDEX: 37.62 KG/M2 | HEIGHT: 69 IN | SYSTOLIC BLOOD PRESSURE: 139 MMHG | DIASTOLIC BLOOD PRESSURE: 97 MMHG | OXYGEN SATURATION: 98 % | HEART RATE: 75 BPM | TEMPERATURE: 98 F

## 2019-05-13 DIAGNOSIS — S93.492D SPRAIN OF ANTERIOR TALOFIBULAR LIGAMENT OF LEFT ANKLE, SUBSEQUENT ENCOUNTER: Primary | ICD-10-CM

## 2019-05-13 DIAGNOSIS — S93.492D HIGH ANKLE SPRAIN, LEFT, SUBSEQUENT ENCOUNTER: ICD-10-CM

## 2019-05-13 DIAGNOSIS — S83.402D SPRAIN OF COLLATERAL LIGAMENT OF LEFT KNEE, SUBSEQUENT ENCOUNTER: ICD-10-CM

## 2019-05-13 NOTE — PROGRESS NOTES
AMBULATORY PROGRESS NOTE      Patient: Raghav Guidry             MRN: [id number]     SSN: [id number] Body mass index is 37.51 kg/m². YOB: 1985     AGE: 34 y.o. EX: male    PCP: Syl Abdi DO     IMPRESSION/DIAGNOSIS AND TREATMENT PLAN     DIAGNOSES  1. Sprain of anterior talofibular ligament of left ankle, subsequent encounter    2. High ankle sprain, left, subsequent encounter    3. Sprain of collateral ligament of left knee, subsequent encounter        Orders Placed This Encounter    REFERRAL TO PHYSICAL THERAPY   I would like to have at least 1 physical therapy session, have an independent set of eyes see him, watching him walk, stand, run, jump. I need to see really we can get a readiness to return to work assessment with a physical therapist.    Indeed, it is costly for him to see physical therapy. It costs him about $65 per session; he has gone to one. I certainly need independent eyes to determine whether he can go back to work. Clinically when I evaluate him, I see no instability of the left knee. Aba sign negative. Ankle, very slight tenderness of the peroneal tendons when stressing it when I the put foot in plantar inversion but no grind, popping, or clicking. No instability of the ankle or subtalar joint, no evidence of peroneal tendinitis. Slight discomfort when I palpate the lateral calcaneal wall firmly. Additional plans are listed as below. Plan:    1) Referral to Physical Therapy. Please perform a return to duty fitness evaluation. 2) Work Note: Please allow Mr. Leonor Dave to continue light duty work until Monday, June 3rd. He is anticipated to return to full duty work after this time, pending reassessment. 3) Continue using the AS/AC brace as needed. 4) Continue activity modification as directed.       RTO - 2 weeks on Wednesday     HPI AND EXAMINATION     Raghav Guidry IS A 34 y.o. male who presents to my outpatient office for follow up of sprain of the ATFL of the left ankle. At the last visit, we ordered a left AS/AC brace and instructed the patient to continue activity modification as directed. Date of injury: 10/23/2018. Since we saw him last, Mr. Beronica Muir states that he has been getting better. He notes that when he inverts while sitting, it reproduces his pain in a lateral distribution. He is able to walk up and down stairs, with minimal signs of weakness. He has been able to go without his ankle brace for a few days, however, he ends up having to go back to the ankle brace, due to pain. The patient states after extended periods of walking is when his pain is at its worst. The patient did not go to his physical therapy for his knee, as prescribed by another physician, due to the cost. He states that he feels 80% some days, stating that the pain shifts from his knees and ankle. The pain is not consistent. He states that his work would like him to be full-time. The patient denies frequent sparring sessions -- once every two years. He is accompanied by his wife today. He reports that he has not seen Dr. Sonny Davis since he has seen me, regarding his knee. 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 Visit Vitals  BP (!) 139/97 (BP 1 Location: Left arm, BP Patient Position: Sitting)   Pulse 75   Temp 98 °F (36.7 °C) (Oral)   Ht 5' 9\" (1.753 m)   Wt 254 lb (115.2 kg)   SpO2 98%   BMI 37.51 kg/m²      Appearance: Alert, well appearing and pleasant patient who is in no distress, oriented to person, place/time, and who follows commands. This patient is accompanied in the examination room by his wife. Dementia: no dementia  Psychiatric: Affect and mood are appropriate. Patient arrives to office via: without assistive device  HEENT: Head normocephalic & atraumatic.  Both pupils are round, non icteric sclera              Eye: EOM are intact and sclera are clear               Neck: ROM WNL and JVD neck is not present Hearings Intact, does not require hearing aid device  Respiratory: Breathing is unlabored without accessory chest muscle use  Cardiovascular/Peripheral Vascular: Normal Pulses to each hand and foot     ANKLE/FOOT left     Gait: Normal  Tenderness: Mild tenderness posterolaterally with external rotation/stress test.     Mild tenderness to the lateral calcaneal wall. Cutaneous: Slight swelling to the anterolateral ankle in comparison to right ankle. Joint Motion: Full PF, inversion, and eversion                          Does not fully DF  Joint / Tendon Stability: No ankle or subtalar instability noted. Negative Pucker's sign                                            No peroneal sublux ability or dislocation  Alignment: Forefoot, Midfoot, Hindfoot WNL. Neuro Motor/Sensory: NL/NL. Vascular: NL foot/ankle pulses. Lymphatics: No extremity lymphedema, No calf swelling, no tenderness to calf muscles.     ANKLE/FOOT right     Tenderness: No tenderness to palpation. Cutaneous:  WNL. Joint Motion: WNL. Joint / Tendon Stability: No instability noted. No peroneal sublux ability or dislocation  Alignment: Forefoot, Midfoot, Hindfoot WNL. Neuro Motor/Sensory: NL/NL. Vascular: NL foot/ankle pulses. Lymphatics: No extremity lymphedema, No calf swelling, no tenderness to calf muscles.      Knees:  Bilateral       Cutaneous: Skin intact, no abrasions, blisters, wounds, erythema       Effusion: Is not present       Crepitus:  no PF joint crepitus       Tenderness: Tenderness: Medial joint line        Alignment of Knee: neutral when standing       ROM: full range of motion       Fullness or swelling: None to popliteal fossa region       Stability: No instability to anterior, posterior, varus, valgus stress testing       Thrust:  No varus thrust with gait       Neuro: Extensor mechanism is intact    CHART REVIEW     Past Medical History:   Diagnosis Date    Condyloma acuminatum     Environmental allergies     Groin pain     Plantar fasciitis     Tinea pedis      Current Outpatient Medications   Medication Sig    valACYclovir (VALTREX) 500 mg tablet Take  by mouth two (2) times a day.  ergocalciferol (ERGOCALCIFEROL) 50,000 unit capsule Take 1 Cap by mouth every seven (7) days.  guaiFENesin-codeine (CHERATUSSIN AC) 100-10 mg/5 mL solution Take 10 mL by mouth four (4) times daily as needed for Cough. Max Daily Amount: 40 mL.  valACYclovir (VALTREX) 1 gram tablet Take 1 Tab by mouth daily.  montelukast (SINGULAIR) 10 mg tablet Take 1 Tab by mouth daily.  ibuprofen (MOTRIN) 600 mg tablet Take 1 Tab by mouth every six (6) hours as needed for Pain. No current facility-administered medications for this visit. Allergies   Allergen Reactions    Penicillins Rash, Swelling and Hives    Mold Extracts Rash    Peanut Rash and Swelling     Past Surgical History:   Procedure Laterality Date    HX OTHER SURGICAL      dental     Social History     Occupational History    Not on file   Tobacco Use    Smoking status: Never Smoker    Smokeless tobacco: Never Used   Substance and Sexual Activity    Alcohol use: No    Drug use: No    Sexual activity: Not on file     Family History   Problem Relation Age of Onset    Hypertension Mother     Diabetes Mother     Hypertension Father         REVIEW OF SYSTEMS : 5/13/2019  ALL BELOW ARE Negative except : SEE HPI     Constitutional: Negative for fever, chills and weight loss. Neg Weight Loss  Cardiovascular: Negative for chest pain, claudication and leg swelling. SOB, HURD   Gastrointestinal/Urological: Negative for  pain, N/V/D/C, Blood in stool or urine,dysuria                         Hematuria, Incontinence, pelvic pain  Musculoskeletal: see HPI.   Neurological: Negative for dizziness and weakness, headaches,Visual Changes             Confusion,  Or Seizures,   Psychiatric/Behavioral: Negative for depression, memory loss and substance abuse. Extremities:  Negative for hair changes, rash or skin lesion changes. Hematologic: Negative for Bleeding problems, bruising, pallor or swollen lymph nodes. Peripheral Vascular: No calf pain, vascular vein tenderness to calf pain              No calf throbbing, posterior knee throbbing pain     DIAGNOSTIC IMAGING     No notes on file     CT Results (most recent):  Results from Hospital Encounter encounter on 03/27/19   CT ANKLE LT WO CONT    Narrative CT Lower extremity without contrast    Indications: Left ankle CT; pain after falling down staircase    Technique: Contiguous 2.5 mm thickness axial images were obtained through the  left ankle. Sagittal and coronal MPR generated. CT scans at this facility are performed using dose optimization technique as  appropriate with performed exam, to include automated exposure control,  adjustment of mA and/or kV according to patient's size (including appropriate  matching for site-specific examinations), or use of iterative reconstruction  technique. Comparison: Plain films October 2018, MRI November 2018    Findings: Intact bony alignment. No acute fracture. [place] of hyperostosis  along the posterior lateral distal tibia, region of the posterior lateral  ligament complex. As seen on prior MRI. Also mild hyperostosis along the distal anterior fibula, level of the anterior  inferior tibiofibular ligament. Minor hyperostosis more caudad, area of the  anterior talofibular ligament. Tibial plafond and and talar dome as well as subtalar joint surfaces are  unremarkable. Partial bones and TMT joints are unremarkable. Some soft tissue edema overlying the anterolateral ankle. Some thickening of  the anterior talofibular ligament. Irregularity and distortion in the area of the anterior tibiofibular ligament. No focal finding of the deltoid ligament.      Regional musculotendinous structures are normally situated, grossly intact. Impression IMPRESSION[de-identified]    1.  Findings consistent with a prior high ankle sprain, with evidence of prior  injury to the anterior and posterior inferior tibiofibular ligaments with  hyperostosis.   -No malalignment   -The soft tissue ligament detail is better delineated on the prior MRI    Thank you for this referral.      Please see above section of this report. I have personally reviewed the results of the above study. The interpretation of this study is my professional opinion. Written by Raymon Ray, as dictated by Dr. Sharifa Pérez. I, Dr. Sharifa Pérez, confirm that all documentation is accurate.

## 2019-05-13 NOTE — LETTER
NOTIFICATION RETURN TO WORK / SCHOOL 
 
5/13/2019 9:54 AM 
 
Mr. Rylee Antonio [id number] To Whom It May Concern: 
 
Rylee Norris is currently under the care of [address] [place] [address] Francisco Jean. Please allow Mr. Jona Kyle to continue light duty work until Monday, June 3rd. He is anticipated to return to full duty work after this time, pending reassessment. If there are questions or concerns please have the patient contact our office.  
 
 
 
Sincerely, 
 
 
Segundo Galvin MD

## 2019-05-16 ENCOUNTER — HOSPITAL ENCOUNTER (OUTPATIENT)
Dept: PHYSICAL THERAPY | Age: 34
Discharge: HOME | End: 2019-05-16

## 2019-05-16 ENCOUNTER — TELEPHONE (OUTPATIENT)
Dept: ORTHOPEDIC SURGERY | Age: 34
End: 2019-05-16

## 2019-05-16 DIAGNOSIS — M17.12 OSTEOARTHRITIS OF LEFT KNEE, UNSPECIFIED OSTEOARTHRITIS TYPE: Primary | ICD-10-CM

## 2019-05-16 DIAGNOSIS — S83.512S SPRAIN OF ANTERIOR CRUCIATE LIGAMENT OF LEFT KNEE, SEQUELA: ICD-10-CM

## 2019-05-16 NOTE — TELEPHONE ENCOUNTER
[employer] called and is requesting a FCE order to be placed in [facility] per Dr. Anoop Cole last office note. Thanks!

## 2019-05-22 ENCOUNTER — APPOINTMENT (OUTPATIENT)
Dept: PHYSICAL THERAPY | Age: 34
End: 2019-05-22

## 2019-05-29 ENCOUNTER — OFFICE VISIT (OUTPATIENT)
Dept: ORTHOPEDIC SURGERY | Age: 34
End: 2019-05-29

## 2019-05-29 VITALS
HEIGHT: 69 IN | HEART RATE: 76 BPM | DIASTOLIC BLOOD PRESSURE: 100 MMHG | TEMPERATURE: 98.2 F | OXYGEN SATURATION: 99 % | WEIGHT: 251 LBS | SYSTOLIC BLOOD PRESSURE: 142 MMHG | BODY MASS INDEX: 37.18 KG/M2

## 2019-05-29 DIAGNOSIS — S83.402D SPRAIN OF COLLATERAL LIGAMENT OF LEFT KNEE, SUBSEQUENT ENCOUNTER: ICD-10-CM

## 2019-05-29 DIAGNOSIS — S93.492D SPRAIN OF ANTERIOR TALOFIBULAR LIGAMENT OF LEFT ANKLE, SUBSEQUENT ENCOUNTER: Primary | ICD-10-CM

## 2019-05-29 DIAGNOSIS — S93.492D HIGH ANKLE SPRAIN, LEFT, SUBSEQUENT ENCOUNTER: ICD-10-CM

## 2019-05-29 NOTE — LETTER
NOTIFICATION RETURN TO WORK / SCHOOL 
 
5/29/2019 9:49 AM 
 
[facility] [id number] To Whom It May Concern: 
 
Raghav Guidry is currently under the care of [address] [place] [address] Francisco Jean. Please allow Mr. Leonor Dave to continue his current work restrictions until at least August 1st. He is still under my care for a severe high ankle sprain. He is recommended to begin physical therapy, and he may need surgery at some point if his symptoms persist.  
 
If there are questions or concerns please have the patient contact our office.  
 
 
 
Sincerely, 
 
 
Rey Wolfe MD

## 2019-05-29 NOTE — PROGRESS NOTES
AMBULATORY PROGRESS NOTE Patient: Serenity Woodson             MRN: [id number]     SSN: [id number] There is no height or weight on file to calculate BMI. YOB: 1985     AGE: 34 y.o. EX: male PCP: Alli Bhardwaj DO IMPRESSION/DIAGNOSIS AND TREATMENT PLAN  
 
DIAGNOSES 1. Sprain of anterior talofibular ligament of left ankle, subsequent encounter 2. High ankle sprain, left, subsequent encounter No orders of the defined types were placed in this encounter. Plan: 
 
1)  
2) RTO -  
 HPI AND EXAMINATION Serenity Woodson IS A 34 y.o. male who presents to my outpatient office for follow up of sprain of the ATFL of the left ankle. At the last visit, I provided a referral to PT, provided a work note, instructed the patient to continue activity modification as directed, and to continue wearing the AS/AC brace. Date of injury: 10/23/2018. Since we saw him last, Mr. 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 There were no vitals taken for this visit. Appearance: Alert, well appearing and pleasant patient who is in no distress, oriented to person, place/time, and who follows commands. This patient is accompanied in the examination room by his wife. Dementia: no dementia Psychiatric: Affect and mood are appropriate. Patient arrives to office via: without assistive device HEENT: Head normocephalic & atraumatic. Both pupils are round, non icteric sclera Eye: EOM are intact and sclera are clear Neck: ROM WNL and JVD neck is not present Hearings Intact, does not require hearing aid device Respiratory: Breathing is unlabored without accessory chest muscle use Cardiovascular/Peripheral Vascular: Normal Pulses to each hand and foot 
  ANKLE/FOOT left 
  
Gait: Normal 
Tenderness: Mild tenderness posterolaterally with external rotation/stress test.  
 Mild tenderness to the lateral calcaneal wall. Cutaneous: Slight swelling to the anterolateral ankle in comparison to right ankle. Joint Motion: Full PF, inversion, and eversion Does not fully DF Joint / Tendon Stability: No ankle or subtalar instability noted. Negative Pucker's sign No peroneal sublux ability or dislocation Alignment: Forefoot, Midfoot, Hindfoot WNL. Neuro Motor/Sensory: NL/NL. Vascular: NL foot/ankle pulses. Lymphatics: No extremity lymphedema, No calf swelling, no tenderness to calf muscles. 
  
ANKLE/FOOT right 
  
Tenderness: No tenderness to palpation. Cutaneous:  WNL. Joint Motion: WNL. Joint / Tendon Stability: No instability noted. No peroneal sublux ability or dislocation Alignment: Forefoot, Midfoot, Hindfoot WNL. Neuro Motor/Sensory: NL/NL. Vascular: NL foot/ankle pulses. Lymphatics: No extremity lymphedema, No calf swelling, no tenderness to calf muscles. Knees:  Bilateral 
     Cutaneous: Skin intact, no abrasions, blisters, wounds, erythema Effusion: Is not present Crepitus:  no PF joint crepitus Tenderness: Tenderness: Medial joint line Alignment of Knee: neutral when standing ROM: full range of motion Fullness or swelling: None to popliteal fossa region Stability: No instability to anterior, posterior, varus, valgus stress testing Thrust:  No varus thrust with gait Neuro: Extensor mechanism is intact CHART REVIEW Past Medical History:  
Diagnosis Date  Condyloma acuminatum  Environmental allergies  Groin pain  Plantar fasciitis  Tinea pedis Current Outpatient Medications Medication Sig  
 valACYclovir (VALTREX) 500 mg tablet Take  by mouth two (2) times a day.   
 ergocalciferol (ERGOCALCIFEROL) 50,000 unit capsule Take 1 Cap by mouth every seven (7) days.  guaiFENesin-codeine (CHERATUSSIN AC) 100-10 mg/5 mL solution Take 10 mL by mouth four (4) times daily as needed for Cough. Max Daily Amount: 40 mL.  valACYclovir (VALTREX) 1 gram tablet Take 1 Tab by mouth daily.  montelukast (SINGULAIR) 10 mg tablet Take 1 Tab by mouth daily.  ibuprofen (MOTRIN) 600 mg tablet Take 1 Tab by mouth every six (6) hours as needed for Pain. No current facility-administered medications for this visit. Allergies Allergen Reactions  Penicillins Rash, Swelling and Hives  Mold Extracts Rash  Peanut Rash and Swelling Past Surgical History:  
Procedure Laterality Date  HX OTHER SURGICAL    
 dental  
 
Social History Occupational History  Not on file Tobacco Use  Smoking status: Never Smoker  Smokeless tobacco: Never Used Substance and Sexual Activity  Alcohol use: No  
 Drug use: No  
 Sexual activity: Not on file Family History Problem Relation Age of Onset  Hypertension Mother  Diabetes Mother  Hypertension Father REVIEW OF SYSTEMS : 5/29/2019  ALL BELOW ARE Negative except : SEE HPI Constitutional: Negative for fever, chills and weight loss. Neg Weight Loss Cardiovascular: Negative for chest pain, claudication and leg swelling. SOB, HURD Gastrointestinal/Urological: Negative for  pain, N/V/D/C, Blood in stool or urine,dysuria                         Hematuria, Incontinence, pelvic pain Musculoskeletal: see HPI. Neurological: Negative for dizziness and weakness, headaches,Visual Changes             Confusion,  Or Seizures, Psychiatric/Behavioral: Negative for depression, memory loss and substance abuse. Extremities:  Negative for hair changes, rash or skin lesion changes. Hematologic: Negative for Bleeding problems, bruising, pallor or swollen lymph nodes. Peripheral Vascular: No calf pain, vascular vein tenderness to calf pain No calf throbbing, posterior knee throbbing pain DIAGNOSTIC IMAGING No notes on file Please see above section of this report. I have personally reviewed the results of the above study. The interpretation of this study is my professional opinion. Written by Joe Galicia, as dictated by Dr. Kelsey Nunez. I, Dr. Kelsey Nunez, confirm that all documentation is accurate.

## 2019-05-29 NOTE — PROGRESS NOTES
AMBULATORY PROGRESS NOTE      Patient: Danny Gant             MRN: [id number]     SSN: [id number] Body mass index is 37.07 kg/m². YOB: 1985     AGE: 34 y.o. EX: male    PCP: Alicia Boston DO     IMPRESSION/DIAGNOSIS AND TREATMENT PLAN     DIAGNOSES  1. Sprain of anterior talofibular ligament of left ankle, subsequent encounter    2. High ankle sprain, left, subsequent encounter    3. Sprain of collateral ligament of left knee, subsequent encounter        Orders Placed This Encounter    [[id number]] Ankle Min 3V    REFERRAL TO PHYSICAL THERAPY   I had a lengthy discussion with him today. I did perform a stress test of the left ankle with him standing, and in looking at the distal tibia and fibula clear space, it looks very similar on the right side. It may be slightly wider, but the medial clear space is normal.  I did perform an external rotation stress test on the ankle, and it did not cause him severe discomfort or really any mild discomfort. He has discomfort when he walks, he states. So, he has never had any formal physical therapy. He reports having discomfort also to the Achilles tendon. My plan for him should he not improve would be arthroscopic surgery to assess the syndesmotic region, perform a live stress test under live fluoroscopy and see whether he is opening up, and if he opens up, then perform an ankle ligament syndesmotic reconstruction with cadaveric tendon, is what I suspect he will require. He was unable to afford the, at least one time, the return to work assessment for physical therapy, which he states costs $700. So, I think the better use of his money would be to get him into physical therapy and see if we can get his ankle strong, have the therapist perform dynamic tests on him, have him run, jump, walk, stand, and stoop and see what things he can and cannot do and where he is having discomfort, as he is having the discomfort.   Even if I need to witness some of his physical therapy sessions, it would be helpful. But in examining the knee, there is no gross instability of the knee. There is no erythema, no popping, and no grinding. There is negative pivot shift test, negative anterior drawer, and negative posterior drawer. Patellofemoral tracking is normal.  There is excellent straight leg strength against resistance. Because of his continued knee pain, we will have him see Dr. Erin Cadet as well for the knee, as Dr. Erin Cadet saw him in the past for his knee. Additional plans are listed as below. Plan:    1) Follow up with Dr. Jimenez Oglesby for knee. 2) Referral to PT. 3) Work Note: Please allow Mr. Ivy Champion to continue his current work restrictions until at least August 1st. He is still under my care for a severe high ankle sprain. He is recommended to begin physical therapy, and he may need surgery at some point if his symptoms persist.     RTO - 4 weeks     HPI AND EXAMINATION     Ivanna Kelly IS A 34 y.o. male who presents to my outpatient office for follow up of sprain of the ATFL of the left ankle and a sprain of the collateral ligament of the left knee. At the last visit, I provided a referral to PT, provided a work note, instructed the patient to continue activity modification as directed, and to continue wearing the AS/AC brace. Date of injury: 10/23/2018. Since we saw him last, Mr. Ivy Champion states that he is still experiencing pain in his left ankle and his left knee (for which he saw Dr. Jimenez Oglesby for initially). He reports that he was unable to go to PT, as they would have charged him $700 for an assessment. He states that he has been avoiding activity to allow his ankle time to heal. He recalls that he was instructed to come out of his AS/AC brace and that he tried to go without it, however, his pain worsened and he had to go back to wearing it regularly.  He notes that he went to the aquarium the other day and did lots of walking, and he felt as though there was too much pressure on his left knee. He finds that his ankle pain bothers him more than his knee. He states that he does have issues when going up and down stairs, but that he does not have major issues. He specifies that the pain is tolerable with his knee. He denies any pain in his right ankle or right knee. XR imaging has been reviewed with the patient. Possible surgical and non-surgical interventions have been discussed with the patient. 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 Visit Vitals  BP (!) 142/100 (BP 1 Location: Right arm, BP Patient Position: Sitting)   Pulse 76   Temp 98.2 °F (36.8 °C) (Oral)   Ht 5' 9\" (1.753 m)   Wt 251 lb (113.9 kg)   SpO2 99%   BMI 37.07 kg/m²      Appearance: Alert, well appearing and pleasant patient who is in no distress, oriented to person, place/time, and who follows commands. This patient is accompanied in the examination room by his self. Dementia: no dementia  Psychiatric: Affect and mood are appropriate. Patient arrives to office via: without assistive device  HEENT: Head normocephalic & atraumatic. Both pupils are round, non icteric sclera              Eye: EOM are intact and sclera are clear               Neck: ROM WNL and JVD neck is not present              Hearings Intact, does not require hearing aid device  Respiratory: Breathing is unlabored without accessory chest muscle use  Cardiovascular/Peripheral Vascular: Normal Pulses to each hand and foot     ANKLE/FOOT left     Gait: Normal  Tenderness: No tenderness posterolaterally with external rotation/stress test.     Mild tenderness to the lateral calcaneal wall. Cutaneous: Slight swelling to the anterolateral ankle in comparison to right ankle. Joint Motion: Full PF, inversion, and eversion                          Does not fully DF    Tenderness with inversion.    Joint / Tendon Stability: No ankle or subtalar instability noted, good end point.            Negative Pucker's sign                                            No peroneal sublux ability or dislocation  Alignment: Forefoot, Midfoot, Hindfoot WNL. Neuro Motor/Sensory: NL/NL. Can double stance heel rise with pain in his posterolateral ankle. Vascular: NL foot/ankle pulses. Lymphatics: No extremity lymphedema, No calf swelling, no tenderness to calf muscles.     Knees:  Left       Cutaneous: Skin intact, no abrasions, blisters, wounds, erythema       Effusion: Is not present       Crepitus:  no PF joint crepitus       Tenderness: Tenderness: Medial joint line        Alignment of Knee: neutral when standing       ROM: full range of motion       Fullness or swelling: None to popliteal fossa region       Stability: No instability to anterior, posterior, varus, valgus stress testing       Thrust:  No varus thrust with gait       Neuro: Extensor mechanism is intact    CHART REVIEW     Past Medical History:   Diagnosis Date    Condyloma acuminatum     Environmental allergies     Groin pain     Plantar fasciitis     Tinea pedis      Current Outpatient Medications   Medication Sig    valACYclovir (VALTREX) 500 mg tablet Take  by mouth two (2) times a day.  ergocalciferol (ERGOCALCIFEROL) 50,000 unit capsule Take 1 Cap by mouth every seven (7) days.  guaiFENesin-codeine (CHERATUSSIN AC) 100-10 mg/5 mL solution Take 10 mL by mouth four (4) times daily as needed for Cough. Max Daily Amount: 40 mL.  valACYclovir (VALTREX) 1 gram tablet Take 1 Tab by mouth daily.  montelukast (SINGULAIR) 10 mg tablet Take 1 Tab by mouth daily.  ibuprofen (MOTRIN) 600 mg tablet Take 1 Tab by mouth every six (6) hours as needed for Pain. No current facility-administered medications for this visit.       Allergies   Allergen Reactions    Penicillins Rash, Swelling and Hives    Mold Extracts Rash    Peanut Rash and Swelling     Past Surgical History:   Procedure Laterality Date    HX OTHER SURGICAL      dental     Social History     Occupational History    Not on file   Tobacco Use    Smoking status: Never Smoker    Smokeless tobacco: Never Used   Substance and Sexual Activity    Alcohol use: No    Drug use: No    Sexual activity: Not on file     Family History   Problem Relation Age of Onset    Hypertension Mother     Diabetes Mother     Hypertension Father         REVIEW OF SYSTEMS : 5/29/2019  ALL BELOW ARE Negative except : SEE HPI     Constitutional: Negative for fever, chills and weight loss. Neg Weight Loss  Cardiovascular: Negative for chest pain, claudication and leg swelling. SOB, HURD   Gastrointestinal/Urological: Negative for  pain, N/V/D/C, Blood in stool or urine,dysuria                         Hematuria, Incontinence, pelvic pain  Musculoskeletal: see HPI. Neurological: Negative for dizziness and weakness, headaches,Visual Changes             Confusion,  Or Seizures,   Psychiatric/Behavioral: Negative for depression, memory loss and substance abuse. Extremities:  Negative for hair changes, rash or skin lesion changes. Hematologic: Negative for Bleeding problems, bruising, pallor or swollen lymph nodes. Peripheral Vascular: No calf pain, vascular vein tenderness to calf pain              No calf throbbing, posterior knee throbbing pain     DIAGNOSTIC IMAGING     No notes on file      Please see above section of this report. I have personally reviewed the results of the above study. The interpretation of this study is my professional opinion. Written by Elvia Ellis, as dictated by Dr. Delfina Macdonald. I, Dr. Delfina Macdonald, confirm that all documentation is accurate.

## 2019-05-29 NOTE — PATIENT INSTRUCTIONS
Ankle Sprain: Care Instructions  Your Care Instructions    An ankle sprain can happen when you twist your ankle. The ligaments that support the ankle can get stretched and torn. Often the ankle is swollen and painful. Ankle sprains may take from several weeks to several months to heal. Usually, the more pain and swelling you have, the more severe your ankle sprain is and the longer it will take to heal. You can heal faster and regain strength in your ankle with good home treatment. It is very important to give your ankle time to heal completely, so that you do not easily hurt your ankle again. Follow-up care is a key part of your treatment and safety. Be sure to make and go to all appointments, and call your doctor if you are having problems. It's also a good idea to know your test results and keep a list of the medicines you take. How can you care for yourself at home? · Prop up your foot on pillows as much as possible for the next 3 days. Try to keep your ankle above the level of your heart. This will help reduce the swelling. · Follow your doctor's directions for wearing a splint or elastic bandage. Wrapping the ankle may help reduce or prevent swelling. · Your doctor may give you a splint, a brace, an air stirrup, or another form of ankle support to protect your ankle until it is healed. Wear it as directed while your ankle is healing. Do not remove it unless your doctor tells you to. After your ankle has healed, ask your doctor whether you should wear the brace when you exercise. · Put ice or cold packs on your injured ankle for 10 to 20 minutes at a time. Try to do this every 1 to 2 hours for the next 3 days (when you are awake) or until the swelling goes down. Put a thin cloth between the ice and your skin. · You may need to use crutches until you can walk without pain. If you do use crutches, try to bear some weight on your injured ankle if you can do so without pain.  This helps the ankle heal.  · Take pain medicines exactly as directed. ? If the doctor gave you a prescription medicine for pain, take it as prescribed. ? If you are not taking a prescription pain medicine, ask your doctor if you can take an over-the-counter medicine. · If you have been given ankle exercises to do at home, do them exactly as instructed. These can promote healing and help prevent lasting weakness. When should you call for help? Call your doctor now or seek immediate medical care if:    · Your pain is getting worse.     · Your swelling is getting worse.     · Your splint feels too tight or you are unable to loosen it.    Watch closely for changes in your health, and be sure to contact your doctor if:    · You are not getting better after 1 week. Where can you learn more? Go to http://paulina-cale.info/. Enter W982 in the search box to learn more about \"Ankle Sprain: Care Instructions. \"  Current as of: September 20, 2018  Content Version: 11.9  © 9087-7262 [employer]. Care instructions adapted under license by [employer] (which disclaims liability or warranty for this information). If you have questions about a medical condition or this instruction, always ask your healthcare professional. [place] [address] any warranty or liability for your use of this information.

## 2019-06-05 ENCOUNTER — HOSPITAL ENCOUNTER (OUTPATIENT)
Dept: PHYSICAL THERAPY | Age: 34
Discharge: HOME | End: 2019-06-05
Payer: COMMERCIAL

## 2019-06-05 PROCEDURE — 97530 THERAPEUTIC ACTIVITIES: CPT

## 2019-06-05 PROCEDURE — 97110 THERAPEUTIC EXERCISES: CPT

## 2019-06-05 PROCEDURE — 97161 PT EVAL LOW COMPLEX 20 MIN: CPT

## 2019-06-05 NOTE — PROGRESS NOTES
PT DAILY TREATMENT NOTE/FOOT AND ANKLE EVAL 10-18    Patient Name: Danny Gatn  Date:2019  : 1985  [x]  Patient  Verified  Payor: [employer] / Plan: [address] [place] [address] [place] [employer] / Product Type: HMO /    In time:200  Out time:243  Total Treatment Time (min): 43  Visit #: 1 of       Treatment Area: Left ankle pain [M25.572]  Sprain of other ligament of left ankle, subsequent encounter [S93.492D]  Sprain of tibiofibular ligament of left ankle, subsequent encounter [S93.432D]    SUBJECTIVE  Pain Level (0-10 scale): 2-3/10  []constant []intermittent []improving []worsening []no change since onset    Any medication changes, allergies to medications, adverse drug reactions, diagnosis change, or new procedure performed?: [x] No    [] Yes (see summary sheet for update)  Subjective functional status/changes:    Left ankle pain  Oct 2018 fell down the stairs, partial tears to ligaments   Could not attend PT due to high copay  Skip hops when jogging   Dependent on knee and ankle brace  Not physically active, does not workout out, but needs to be able to run while on duty   Work Hx: light duty, desk job,    Pt Goals: \" strengthen knee and ankle\"  \" to be able to run and jump\"      OBJECTIVE/EXAMINATION      20 min [x]Eval                  []Re-Eval       15 min Therapeutic Exercise:  [] See flow sheet : HEP   Rationale: increase ROM, increase strength, improve coordination and improve balance to improve the patients ability to increase ease of ambulation.      8 min Therapeutic Activity:  []  See flow sheet : educated on POC, educated on rest/ice/elevation/compression, given arch support insert in shoe to trial, educated on kinetic chain alignment   Rationale: increase ROM, increase strength and improve coordination  to improve the patients ability to increase ease of ADL's             With   [] TE   [] TA   [] neuro   [] other: Patient Education: [x] Review HEP    [] Progressed/Changed HEP based on: [] positioning   [] body mechanics   [] transfers   [] heat/ice application    [] other:      Other Objective/Functional Measures:     Physical Therapy Evaluation  - Foot and Ankle    Gait: [] Normal    [] Abnormal    [x] Antalgic    [] NWB    Device:  Describe: decreased weight bearing left LE    ROM/Strength  [] Unable to assess at this time      AROM        PROM            Strength (1-5)   Left Right Left Right Left  Right   Dorsiflexion 8 deg 10 deg   4+/5 5/5   Plantarflexion     2+/5 4/5   Inversion     4/5 5/5   Eversion     4/5 5/5   Great Toe Ext         Great Toe Flex           Flexibility: [] Unable to assess at this time  Gastroc:    (L) Tightness [] WNL   [] Min   [x] Mod   [] Severe    (R) Tightness [] WNL   [] Min   [] Mod   [] Severe  Soleus:    (L) Tightness [] WNL   [] Min   [x] Mod   [] Severe    (R) Tightness [] WNL   [] Min   [] Mod   [] Severe  Other:      (L) Tightness [] WNL   [] Min   [] Mod   [] Severe    (R) Tightness [] WNL   [] Min   [] Mod   [] Severe    Palpation:   Location: lateral malleoli   Patient's Pain Response: [] Min   [x] Mod   [] Severe  Location:  Patient's Pain Response: [] Min   [] Mod   [] Severe    Optional Tests:  Balance/Stork Test: touches/60sec (L): (R):    Single Leg Hop:   (L) Distance(ft):  (R) Distance(ft):  (L)/(R)%:   (L) Time(sec):  (R) Time(sec): (L)/(R)%:      Sub-talor alignment: [] Neutral     [] Pronation      [] Supination    Forefoot alignment:  [] Neutral     [] Varus            [] Valgus    Swelling:   Left (cm) Right (cm)   Figure 8:     Midfoot:      Malleoli Level:     MTH:        Anterior Drawer: [] Neg    [] Pos  Posterior Drawer:  [] Neg    [] Pos  Inversion Stress:  [] Neg    [x] Pos  Talar Tilt:   [] Neg    [] Pos  Eversion Stress:  [] Neg    [] Pos  Kenyetta's Sign:  [] Neg    [] Pos  Roger Test: [] Neg    [] Pos    Other tests/ comments:  Arches collapse in weight bearing       Pain Level (0-10 scale) post treatment: 2-3/10    ASSESSMENT/Changes in Function: see POC    Patient will continue to benefit from skilled PT services to modify and progress therapeutic interventions, address functional mobility deficits, address ROM deficits, address strength deficits, analyze and address soft tissue restrictions and analyze and cue movement patterns to attain remaining goals.      [x]  See Plan of Care  []  See progress note/recertification  []  See Discharge Summary         Progress towards goals / Updated goals:  See POC    PLAN  []  Upgrade activities as tolerated     [x]  Continue plan of care  []  Update interventions per flow sheet       []  Discharge due to:_  []  Other:_      Duane Lederer, PT 6/5/2019  10:02 AM

## 2019-06-05 NOTE — PROGRESS NOTES
[facility]  [address] [place] [address]  [phone] fax    Plan of Care/ Statement of Necessity for Physical Therapy Services    Patient name: Rylee Norris Start of Care: 2019   Referral source: Yohan Wasserman MD : 1985    Medical Diagnosis: Left ankle pain [M25.572]  Sprain of other ligament of left ankle, subsequent encounter [S93.492D]  Sprain of tibiofibular ligament of left ankle, subsequent encounter [S93.432D]  Payor: Jay Gilbert / Plan: [facility] HMO / Product Type: HMO /  Onset Date: 2018    Treatment Diagnosis: left knee and ankle pain    Prior Hospitalization: see medical history Provider#: [id number]   Medications: Verified on Patient summary List    Comorbidities: high blood pressure    Prior Level of Function: , enjoys gardening     The Plan of Care and following information is based on the information from the initial evaluation. Assessment/ key information: Patient is a 34year-old male who presents with chief compliant of left ankle and knee pain pain that began in 2018 after falling down the stairs. His MRI showed grade 2/3 injury of the anterior inferior tibiofibular ligament and grade 1/2 injury of the posterior inferior tibiofibular ligament (high ankle sprain), indistinctness of the calcaneofibular ligament, likely reflecting grade 1/2 injury, and intact anterior and posterior talofibular ligaments. He was unable to attend physical therapy initially after accident due to high co-pay. Pt works as , but currently on light duty. He has been using knee and ankle brace for support when weight bearing. He is TTP along posterior lateral ankle, lateral malleoli, and medial joint line of knee. He presents with decreased left ankle ROM and strength; he is unable to perform single leg plantar flexion. Special tests: Talar tilt positive.  He also presents with collapse arches in standing likely due to glute and foot intrinsic weakness. Patient will benefit from skilled PT to address ROM, strength, and LE stability to restore full duties at work and improve QOL. Evaluation Complexity History MEDIUM  Complexity : 1-2 comorbidities / personal factors will impact the outcome/ POC ; Examination MEDIUM Complexity : 3 Standardized tests and measures addressing body structure, function, activity limitation and / or participation in recreation  ;Presentation LOW Complexity : Stable, uncomplicated  ;Clinical Decision Making MEDIUM Complexity : FOTO score of 26-74  Overall Complexity Rating: LOW   Problem List: pain affecting function, decrease ROM, decrease strength, edema affecting function, impaired gait/ balance, decrease ADL/ functional abilitiies, decrease activity tolerance and decrease flexibility/ joint mobility   Treatment Plan may include any combination of the following: Therapeutic exercise, Therapeutic activities, Neuromuscular re-education, Physical agent/modality, Manual therapy, Patient education and Functional mobility training  Patient / Family readiness to learn indicated by: trying to perform skills  Persons(s) to be included in education: patient (P)  Barriers to Learning/Limitations: None  Patient Goal (s): strengthen knee and ankle to be fit for duty  Patient Self Reported Health Status: fair   Rehabilitation Potential: good    Short Term Goals: To be accomplished in 2 treatments:  1. Patient will be compliant with HEP to increase ankle ROM and strength for ease of ADL's. Long Term Goals: To be accomplished in 12 treatments:  1, Patient will increase FOTO score to 65/100 to show improved functional mobility. 2, Patient will increase left ankle strength to 4+/5 to improve standing/ambulatory tolerance. 3, Patient will report 60% improvement in symptoms to resume full duties at work. 4. Patient will report <2/10 ankle pain in order to improve QOL.       Frequency / Duration: Patient to be seen 2 times per week for 12 treatments. Patient/ CarPatient/ Caregiver education and instruction: Diagnosis, prognosis, activity modification and exercises   [x]  Plan of care has been reviewed with KIMO Weeks, PT 6/5/2019 2:29 PM    ________________________________________________________________________    I certify that the above Therapy Services are being furnished while the patient is under my care. I agree with the treatment plan and certify that this therapy is necessary.     Physician's Signature:____________Date:_________TIME:________    ** Signature, Date and Time must be completed for valid certification **    Please sign and return to [facility] - [facility]  [address] [place] [address]  [phone] fax

## 2019-06-10 ENCOUNTER — HOSPITAL ENCOUNTER (OUTPATIENT)
Dept: PHYSICAL THERAPY | Age: 34
Discharge: HOME | End: 2019-06-10
Payer: COMMERCIAL

## 2019-06-10 PROCEDURE — 97140 MANUAL THERAPY 1/> REGIONS: CPT

## 2019-06-10 PROCEDURE — 97110 THERAPEUTIC EXERCISES: CPT

## 2019-06-10 NOTE — PROGRESS NOTES
PT DAILY TREATMENT NOTE 10-18    Patient Name: Sunita Garcia  Date:6/10/2019  : 1985  [x]  Patient  Verified  Payor: Marybel French / Plan: [address] HMO / Product Type: HMO /    In time:10:02  Out time:10:30  Total Treatment Time (min): 28  Visit #: 2 of 12    Treatment Area: Left ankle pain [M25.572]  Sprain of other ligament of left ankle, subsequent encounter [S93.492D]  Sprain of tibiofibular ligament of left ankle, subsequent encounter [S93.432D]    SUBJECTIVE  Pain Level (0-10 scale): 2-3/10  Any medication changes, allergies to medications, adverse drug reactions, diagnosis change, or new procedure performed?: [x] No    [] Yes (see summary sheet for update)  Subjective functional status/changes:   [] No changes reported  Pt reports his knee has been bothering him and it is sharp at times. He is doing his exercises at home but had increased pain with the band exercise turning his foot inward. He is trying to not wear the brace for his knee and ankle per the MD recommendation. He hasn't been able to ice or elevate with his 12 hour work shifts. He has a baby due  and is the main provider for his family. He can't afford arch supports right now. He felt the bike irritated his ankle and knee.      OBJECTIVE    18 min Therapeutic Exercise:  [x] See flow sheet :   Rationale: increase ROM, increase strength, improve coordination, improve balance and increase proprioception to improve the patients ability to return to full duty as a     10 minutes of manual therapy to correct a left upslip with leg lengthening x 10, MET for right AI, shotgun technique, anterior proximal fibula mobs to improve ease of ambulation with decreased knee or ankle pain          With   [] TE   [] TA   [] neuro   [] other: Patient Education: [x] Review HEP    [] Progressed/Changed HEP based on:   [] positioning   [] body mechanics   [] transfers   [] heat/ice application    [] other:      Other Objective/Functional Measures:   Switched from isotonic to isometric strengthening x 4 for the ankle  Educated on short foot exercise to work on while waiting to be able to get orthotics  Educated on fibula mobs we would perform to improve mobility  Decreased knee pain post mobs  Educated on ambulation with normal heel strike and working on weight bearing evenly      Pain Level (0-10 scale) post treatment: 2-3/10    ASSESSMENT/Changes in Function: Initiated exercise program per POC. Pt with sharp knee pain since removing brace depending on foot position. He has pes planus but cannot currently afford orthotics. He has pelvic obliquity due to compensation from pain and has decreased inversion/eversion ankle strength from wearing the brace. 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 Short Term Goals: To be accomplished in 2 treatments:  1. Patient will be compliant with HEP to increase ankle ROM and strength for ease of ADL's. MET  Long Term Goals: To be accomplished in 12 treatments:  1, Patient will increase FOTO score to 65/100 to show improved functional mobility. 2, Patient will increase left ankle strength to 4+/5 to improve standing/ambulatory tolerance. 3, Patient will report 60% improvement in symptoms to resume full duties at work. 4. Patient will report <2/10 ankle pain in order to improve QOL.         PLAN  [x]  Upgrade activities as tolerated     [x]  Continue plan of care  []  Update interventions per flow sheet       []  Discharge due to:_  []  Other:_      Luisana Prather PTA 6/10/2019  8:57 AM    Future Appointments   Date Time Provider Tono Evans   6/10/2019 10:00 AM Ian Cheek PTA [facility]CPTP[facility]   6/11/2019 11:30 AM Trudi Lay, PT QYLAHVV [facility]   6/19/2019 11:00 AM Trudi Lay, PT [id number] [facility]   6/20/2019  9:00 AM Ian Cheek PTA MMCFABIÁN [facility]   6/24/2019  9:30 AM Florecita Nath Yusuf Faith, PTA [facility]   6/25/2019  9:30 AM Akil Agustin, PTA [facility]   6/26/2019  8:45 AM Evelina Jolley MD [place] [id number]   7/3/2019  9:00 AM Akil Agustin, PTA [facility]   7/5/2019  8:00 AM Noreen Saini, PT [facility]

## 2019-06-11 ENCOUNTER — HOSPITAL ENCOUNTER (OUTPATIENT)
Dept: PHYSICAL THERAPY | Age: 34
Discharge: HOME | End: 2019-06-11
Payer: COMMERCIAL

## 2019-06-11 PROCEDURE — 97140 MANUAL THERAPY 1/> REGIONS: CPT

## 2019-06-11 PROCEDURE — 97110 THERAPEUTIC EXERCISES: CPT

## 2019-06-11 PROCEDURE — 97016 VASOPNEUMATIC DEVICE THERAPY: CPT

## 2019-06-11 NOTE — PROGRESS NOTES
PT DAILY TREATMENT NOTE 10-18    Patient Name: Rylee Norris  Date:2019  : 1985  [x]  Patient  Verified  Payor: Jay Gilbert / Plan: [address] [place] [address] [place] H[facility] / Product Type: HMO /    In time: 11:05  Out time: 11:55  Total Treatment Time (min): 50  Visit #: 3 of 12    Treatment Area: Left ankle pain [M25.572]  Sprain of other ligament of left ankle, subsequent encounter [S93.492D]  Sprain of tibiofibular ligament of left ankle, subsequent encounter [S93.432D]    SUBJECTIVE  Pain Level (0-10 scale): 2/10  Any medication changes, allergies to medications, adverse drug reactions, diagnosis change, or new procedure performed?: [x] No    [] Yes (see summary sheet for update)  Subjective functional status/changes:   [] No changes reported  Pt reports pain mostly around malleoli today. Pt reports min improvement of pain after correction of pelvic alignment.      OBJECTIVE    Modality rationale: decrease edema, decrease inflammation and decrease pain to improve the patients ability to tolerate ADLs/amb   Min Type Additional Details    [] Estim:  []Unatt       []IFC  []Premod                        []Other:  []w/ice   []w/heat  Position:  Location:    [] Estim: []Att    []TENS instruct  []NMES                    []Other:  []w/US   []w/ice   []w/heat  Position:  Location:    []  Traction: [] Cervical       []Lumbar                       [] Prone          []Supine                       []Intermittent   []Continuous Lbs:  [] before manual  [] after manual    []  Ultrasound: []Continuous   [] Pulsed                           []1MHz   []3MHz W/cm2:  Location:    []  Iontophoresis with dexamethasone         Location: [] Take home patch   [] In clinic   10 [x]  Ice     []  heat  []  Ice massage  []  Laser   []  Anodyne Position: supine  Location: Left knee    []  Laser with stim  []  Other:  Position:  Location:   10 [x]  Vasopneumatic Device Pressure:       [x] lo [] med [] hi   Temperature: [x] lo [] med [] hi   [x] Skin assessment post-treatment:  [x]intact []redness- no adverse reaction    []redness - adverse reaction:     32 min Therapeutic Exercise:  [x] See flow sheet :   Rationale: increase ROM, increase strength, improve coordination, improve balance and increase proprioception to improve the patients ability to return to full duty as a     8 min Manual Therapy:  Assess pelvic alignment and anterior proximal fibula mobs   Rationale: decrease pain, increase ROM, increase tissue extensibility and decrease trigger points to improve pt's tolerance for ADLs/amd           With   [] TE   [] TA   [] neuro   [] other: Patient Education: [x] Review HEP    [] Progressed/Changed HEP based on:   [] positioning   [] body mechanics   [] transfers   [] heat/ice application    [] other:      Other Objective/Functional Measures:    Fair tolerance with all therex   Compensated with hip flex during sidelying hip abd   Poor coordination with maintaining core/TA during SLR     Pain Level (0-10 scale) post treatment: 0-1/10    ASSESSMENT/Changes in Function: pt was mod challenged with all therex due to pain and hips weakness. Noticed trigger points along Left ant tib. Will cont to progress with strengthening as tolerated. Patient will continue to benefit from skilled PT services to modify and progress therapeutic interventions, address functional mobility deficits, address ROM deficits, address strength deficits, analyze and address soft tissue restrictions, analyze and cue movement patterns, analyze and modify body mechanics/ergonomics, assess and modify postural abnormalities, address imbalance/dizziness and instruct in home and community integration to attain remaining goals. [x]  See Plan of Care  []  See progress note/recertification  []  See Discharge Summary          Patient will be compliant with HEP to increase ankle ROM and strength for ease of ADL's.  MET  Long Term Goals: To be accomplished in 12 treatments:  1, Patient will increase FOTO score to 65/100 to show improved functional mobility. 2, Patient will increase left ankle strength to 4+/5 to improve standing/ambulatory tolerance. 3, Patient will report 60% improvement in symptoms to resume full duties at work. 4. Patient will report <2/10 ankle pain in order to improve QOL.            PLAN  [x]  Upgrade activities as tolerated     [x]  Continue plan of care  []  Update interventions per flow sheet       []  Discharge due to:_  []  Other:_      Dona Soliz, PT 6/11/2019  9:50 AM    Future Appointments   Date Time Provider Tono Evans   6/11/2019 11:30 AM Ning GARRIDOVUBBT [facility]   6/19/2019 11:00 AM Deaanson Pereira, PT [facility]   6/20/2019  9:00 AM Oriana Mcmahan PTA [facility]   6/24/2019  9:30 AM Oriana Mcmahan PTA [facility]   6/25/2019  9:30 AM Oriana Mcmahan PTA [facility]   6/26/2019  8:45 AM Miguelina Jolley MD [facility]   7/3/2019  9:00 AM Oriana Mcmahan PTA [facility]   7/5/2019  8:00 AM Beatrice Pereira, PT [facility]

## 2019-06-19 ENCOUNTER — HOSPITAL ENCOUNTER (OUTPATIENT)
Dept: PHYSICAL THERAPY | Age: 34
Discharge: HOME | End: 2019-06-19
Payer: COMMERCIAL

## 2019-06-19 PROCEDURE — 97110 THERAPEUTIC EXERCISES: CPT

## 2019-06-19 PROCEDURE — 97140 MANUAL THERAPY 1/> REGIONS: CPT

## 2019-06-19 NOTE — PROGRESS NOTES
PT DAILY TREATMENT NOTE 10-18    Patient Name: Bev Almodovar  Date:2019  : 1985  [x]  Patient  Verified  Payor: Loni Mcfarlane / Plan: [employer] H[employer] / Product Type: HMO /    In time: 11:01  Out time: 11:50  Total Treatment Time (min): 49  Visit #: 4 of 12    Treatment Area: Left ankle pain [M25.572]  Sprain of other ligament of left ankle, subsequent encounter [S93.889D]  Sprain of tibiofibular ligament of left ankle, subsequent encounter [S93.191D]    SUBJECTIVE  Pain Level (0-10 scale):  2/10  Any medication changes, allergies to medications, adverse drug reactions, diagnosis change, or new procedure performed?: [x] No    [] Yes (see summary sheet for update)  Subjective functional status/changes:   [] No changes reported  Pt. Reports he is getting a little better. He walked here today and had pain in knee and ankle walking in grass.      OBJECTIVE    Modality rationale: decrease pain and increase tissue extensibility to improve the patients ability to increase ease of ambulation   Min Type Additional Details    [] Estim:  []Unatt       []IFC  []Premod                        []Other:  []w/ice   []w/heat  Position:  Location:    [] Estim: []Att    []TENS instruct  []NMES                    []Other:  []w/US   []w/ice   []w/heat  Position:  Location:    []  Traction: [] Cervical       []Lumbar                       [] Prone          []Supine                       []Intermittent   []Continuous Lbs:  [] before manual  [] after manual    []  Ultrasound: []Continuous   [] Pulsed                           []1MHz   []3MHz W/cm2:  Location:    []  Iontophoresis with dexamethasone         Location: [] Take home patch   [] In clinic   10 [x]  Ice     []  heat  []  Ice massage  []  Laser   []  Anodyne Position: supine  Location: left knee and left ankle    []  Laser with stim  []  Other:  Position:  Location:    []  Vasopneumatic Device Pressure:       [] lo [] med [] hi   Temperature: [] lo [] med [] hi [x] Skin assessment post-treatment:  [x]intact []redness- no adverse reaction    []redness - adverse reaction:     29 min Therapeutic Exercise:  [x] See flow sheet :   Rationale: increase ROM and increase strength to improve the patients ability to increase ease of ADLs    10 min Manual Therapy:  Trigger point release to left distal adductors, TC post mobs, KT for peroneal tendon stability. Rationale: decrease pain, increase ROM and increase tissue extensibility to increase ease of ADLs          With   [x] TE   [] TA   [] neuro   [] other: Patient Education: [x] Review HEP    [] Progressed/Changed HEP based on:   [] positioning   [] body mechanics   [] transfers   [] heat/ice application    [] other:      Other Objective/Functional Measures:   Left PF strength: 4/5 with pain  Was able to hold SLS for 30 seconds with eyes open but had significant difficulty with eyes closed  He has pain with IV/EV with theraband and has palpable clicking with pain with peroneal tendon rolling on lateral malleolus. Some decrease in pain manual pressure over peroneal tendon to decrease rolling. During squatting pt. Had wide stance and excessive B ER with toes out. Pain with squatting. Mild decrease in pain with 2x4 under heels during squatting  Decreased knee pain with walking after trigger point release to adductors  Pt. Has pain with most activities today but gave good effort during PT. Pain Level (0-10 scale) post treatment: 0/10    ASSESSMENT/Changes in Function:  Pt. Is progressing with physical therapy. He continues to have significant pain with exercises and is unable to jog or jump. He reports walking is easier but still has difficulty with long distances or uneven surfaces. He demonstrates improving PF strength as well.      Patient will continue to benefit from skilled PT services to modify and progress therapeutic interventions, address functional mobility deficits, address ROM deficits, address strength deficits, analyze and address soft tissue restrictions, analyze and cue movement patterns, analyze and modify body mechanics/ergonomics and assess and modify postural abnormalities to attain remaining goals. Progress towards goals / Updated goals:  Short Term Goals: To be accomplished in 2 treatments:  1. Patient will be compliant with HEP to increase ankle ROM and strength for ease of ADL's. MET  Long Term Goals: To be accomplished in 12 treatments:  1, Patient will increase FOTO score to 65/100 to show improved functional mobility. 2, Patient will increase left ankle strength to 4+/5 to improve standing/ambulatory tolerance. Progressing: left PF 4/5 (6/19/19)  3, Patient will report 60% improvement in symptoms to resume full duties at work. 4. Patient will report <2/10 ankle pain in order to improve QOL.     PLAN  []  Upgrade activities as tolerated     [x]  Continue plan of care  []  Update interventions per flow sheet       []  Discharge due to:_  []  Other:_      Rey Mcnamara PT 6/19/2019  11:00 AM    Future Appointments   Date Time Provider Tono Evans   6/20/2019  9:00 AM Cinthia Alan PTA [facility]   6/24/2019  9:30 AM Cinthia Alan PTA [facility]   6/25/2019  9:30 AM Cinthia Alan PTA [facility]   6/26/2019  8:45 AM Catarina Jollye MD [address]   7/3/2019  9:00 AM Cinthia Alan PTA [facility]   7/5/2019  8:00 AM Connie Castaneda PT [facility]

## 2019-06-20 ENCOUNTER — DOCUMENTATION ONLY (OUTPATIENT)
Dept: ORTHOPEDIC SURGERY | Age: 34
End: 2019-06-20

## 2019-06-20 ENCOUNTER — HOSPITAL ENCOUNTER (OUTPATIENT)
Dept: PHYSICAL THERAPY | Age: 34
Discharge: HOME | End: 2019-06-20
Payer: COMMERCIAL

## 2019-06-20 PROCEDURE — 97016 VASOPNEUMATIC DEVICE THERAPY: CPT

## 2019-06-20 PROCEDURE — 97110 THERAPEUTIC EXERCISES: CPT

## 2019-06-20 NOTE — PROGRESS NOTES
PT DAILY TREATMENT NOTE 10-18    Patient Name: Pam Colby  Date:2019  : 1985  [x]  Patient  Verified  Payor: Mary Quinn / Plan: [employer] [place] [employer] / Product Type: HMO /    In time: 9:01  Out time: 9:57  Total Treatment Time (min):56  Visit #: 5 of 12    Treatment Area: Left ankle pain [M25.572]  Sprain of other ligament of left ankle, subsequent encounter [S93.292D]  Sprain of tibiofibular ligament of left ankle, subsequent encounter [S93.432D]    SUBJECTIVE  Pain Level (0-10 scale):  1.5/10  Any medication changes, allergies to medications, adverse drug reactions, diagnosis change, or new procedure performed?: [x] No    [] Yes (see summary sheet for update)  Subjective functional status/changes:   [] No changes reported       OBJECTIVE    Modality rationale: decrease pain and increase tissue extensibility to improve the patients ability to increase ease of ambulation   Min Type Additional Details    [] Estim:  []Unatt       []IFC  []Premod                        []Other:  []w/ice   []w/heat  Position:  Location:    [] Estim: []Att    []TENS instruct  []NMES                    []Other:  []w/US   []w/ice   []w/heat  Position:  Location:    []  Traction: [] Cervical       []Lumbar                       [] Prone          []Supine                       []Intermittent   []Continuous Lbs:  [] before manual  [] after manual    []  Ultrasound: []Continuous   [] Pulsed                           []1MHz   []3MHz W/cm2:  Location:    []  Iontophoresis with dexamethasone         Location: [] Take home patch   [] In clinic   During game ready [x]  Ice     []  heat  []  Ice massage  []  Laser   []  Anodyne Position: supine  Location: left knee    []  Laser with stim  []  Other:  Position:  Location:   15 [x]  Vasopneumatic Device-left ankle Pressure:       [x] lo [] med [] hi   Temperature: [x] lo [] med [] hi   [x] Skin assessment post-treatment:  [x]intact []redness- no adverse reaction    []redness - adverse reaction:     35 min Therapeutic Exercise:  [x] See flow sheet :   Rationale: increase ROM and increase strength to improve the patients ability to increase ease of ADLs    6 min Manual Therapy:  KT to facilitate B arches   Rationale: decrease pain, increase ROM and increase tissue extensibility to increase ease of ADLs          With   [x] TE   [] TA   [] neuro   [] other: Patient Education: [x] Review HEP    [] Progressed/Changed HEP based on:   [] positioning   [] body mechanics   [] transfers   [] heat/ice application    [] other:      Other Objective/Functional Measures:   Educated on correct shoe support for work and better boots with arch support  Educated about KT for arch support and we could show pt how to self perform until he can afford arches  Challenged with butt burners and bridges  Able to perform AROM without increased pain in supine to the left ankle  With TB slight pain with inversion/eversion  Educated on stick massage to ITB    Pain Level (0-10 scale) post treatment: 0/10    ASSESSMENT/Changes in Function:  Pt is progressing towards goals with decreased pain. He has decreased ankle and hip strength causing knee instability during gait. His B pes planus is also contributing to poor alignment and impact during walking activities. Will continue with general strength/stability exercises to improve ease of walking with better arch for return to full duty at work. .          Progress towards goals / Updated goals:  Short Term Goals: To be accomplished in 2 treatments:  1. Patient will be compliant with HEP to increase ankle ROM and strength for ease of ADL's. MET  Long Term Goals: To be accomplished in 12 treatments:  1, Patient will increase FOTO score to 65/100 to show improved functional mobility. 2, Patient will increase left ankle strength to 4+/5 to improve standing/ambulatory tolerance.   Progressing: left PF 4/5 (6/19/19)  3, Patient will report 60% improvement in symptoms to resume full duties at work. 4. Patient will report <2/10 ankle pain in order to improve QOL.     PLAN  [x]  Upgrade activities as tolerated     [x]  Continue plan of care  []  Update interventions per flow sheet       []  Discharge due to:_  []  Other:_      Clara Jeong PTA 6/20/2019  11:00 AM    Future Appointments   Date Time Provider Tono Cristina   6/24/2019  9:30 AM Clois Safe, PTA [facility]   6/25/2019  9:30 AM Clois Safe, PTA [facility]   6/26/2019  8:45 AM Juan Jolley MD [facility]   7/3/2019  9:00 AM Clois Safe, PTA [facility]   7/5/2019  8:00 AM Yasmany Ramos, PT [facility]

## 2019-06-20 NOTE — PROGRESS NOTES
Patient dropped off at [facility] an updated detailed light duty status request.  Patient will  this letter when completed at the [facility] location. Patient is aware of the 8-36 business day policy but is asking that this paperwork be expedited as it shows a deadline of June 25th. Patient can be reached at [phone].

## 2019-06-21 NOTE — PROGRESS NOTES
Patient called back stating that he needs this letter done as soon as possible before 6/25/19. I did remind him of the 4-61 business day policy but he asked again to have it expedited. Please advise patient at [phone].

## 2019-06-24 ENCOUNTER — HOSPITAL ENCOUNTER (OUTPATIENT)
Dept: PHYSICAL THERAPY | Age: 34
Discharge: HOME | End: 2019-06-24
Payer: COMMERCIAL

## 2019-06-24 ENCOUNTER — DOCUMENTATION ONLY (OUTPATIENT)
Dept: ORTHOPEDIC SURGERY | Age: 34
End: 2019-06-24

## 2019-06-24 PROCEDURE — 97110 THERAPEUTIC EXERCISES: CPT

## 2019-06-24 NOTE — PROGRESS NOTES
Patient has appt on 6/26/19 but needs work note by 6/25/19. Per Dr. Dona Long nurse Jaden Jason, we can provide last office visit note/work letter from 5/29/19 and if there are any new changes at f/u appt on 6/26/19 we can provide additional documentation at that time. Spoke with patient and informed him of this and that his last office note/ last work note from 5/29/19 is ready to be picked up at the [facility] location.

## 2019-06-24 NOTE — PROGRESS NOTES
PT DAILY TREATMENT NOTE 10-18    Patient Name: Shruthi Tong  Date:2019  : 1985  [x]  Patient  Verified  Payor: Jean Carlos Hugo / Plan: [employer] / Product Type: HMO /    In time: 9:32  Out time: 10:11  Total Treatment Time (min):39  Visit #: 6 of 12    Treatment Area: Left ankle pain [M25.572]  Sprain of other ligament of left ankle, subsequent encounter [S93.492D]  Sprain of tibiofibular ligament of left ankle, subsequent encounter [S93.432D]    SUBJECTIVE  Pain Level (0-10 scale):  1/10  Any medication changes, allergies to medications, adverse drug reactions, diagnosis change, or new procedure performed?: [x] No    [] Yes (see summary sheet for update)  Subjective functional status/changes:   [] No changes reported   Pt reports he always wears the same shoes with the felt inserts now and is working on getting new work boots. He states no big difference with the tape and it came off in the shower. He is trying to do more walking without the brace. He still has episodes of sharp pain in the ankle and knee with walking at times. OBJECTIVE      39 min Therapeutic Exercise:  [x] See flow sheet :   Rationale: increase ROM and increase strength to improve the patients ability to increase ease of ADLs        With   [x] TE   [] TA   [] neuro   [] other: Patient Education: [x] Review HEP    [] Progressed/Changed HEP based on:   [] positioning   [] body mechanics   [] transfers   [] heat/ice application    [] other:      Other Objective/Functional Measures:   Educated on preventing LE compensation with ankle TB x 4  Educated to work on short foot in standing  Worked on standing balance on foam to improve ankle stability  Slight increase in pain by end of session  Ankle rolling into inversion on foam    Pain Level (0-10 scale) post treatment: 2/10    ASSESSMENT/Changes in Function:  Pt making slow, steady progress towards goals in therapy. He has improving ankle strength and decreased pain.  He is still getting sharp pains occasionally in the knee and ankle likely instability and pes planus. 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 .          Progress towards goals / Updated goals:  Short Term Goals: To be accomplished in 2 treatments:  1. Patient will be compliant with HEP to increase ankle ROM and strength for ease of ADL's. MET  Long Term Goals: To be accomplished in 12 treatments:  1, Patient will increase FOTO score to 65/100 to show improved functional mobility. 2, Patient will increase left ankle strength to 4+/5 to improve standing/ambulatory tolerance. Progressing: left PF 4/5 (6/19/19)  3, Patient will report 60% improvement in symptoms to resume full duties at work. 4. Patient will report <2/10 ankle pain in order to improve QOL.     PLAN  [x]  Upgrade activities as tolerated     [x]  Continue plan of care  []  Update interventions per flow sheet       []  Discharge due to:_  []  Other:_      Tomasa Lundberg PTA 6/24/2019  11:00 AM    Future Appointments   Date Time Provider Tono Evans   6/25/2019  9:30 AM Harleen Manzo PTA [facility]   6/26/2019  8:45 AM Zach Jolley MD [place] [id number]   7/3/2019  9:00 AM Harleen Manzo PTA [facility]   7/5/2019  8:00 AM Alondra Seay PT [facility]

## 2019-06-25 ENCOUNTER — HOSPITAL ENCOUNTER (OUTPATIENT)
Dept: PHYSICAL THERAPY | Age: 34
Discharge: HOME | End: 2019-06-25
Payer: COMMERCIAL

## 2019-06-25 PROCEDURE — 97110 THERAPEUTIC EXERCISES: CPT

## 2019-06-25 NOTE — PROGRESS NOTES
PT DAILY TREATMENT NOTE 10-18    Patient Name: Kia Barron  Date:2019  : 1985  [x]  Patient  Verified  Payor: Geeta Valdovinos / Plan: [employer] / Product Type: HMO /    In time: 9:31   Out time: 10:15  Total Treatment Time (min): 44  Visit #: 7 of 12    Treatment Area: Left ankle pain [M25.572]  Sprain of other ligament of left ankle, subsequent encounter [S93.492D]  Sprain of tibiofibular ligament of left ankle, subsequent encounter [S93.432D]    SUBJECTIVE  Pain Level (0-10 scale):  1.5/10  Any medication changes, allergies to medications, adverse drug reactions, diagnosis change, or new procedure performed?: [x] No    [] Yes (see summary sheet for update)  Subjective functional status/changes:   [] No changes reported  Pt reports some ankle and knee pain today. He is wondering if he can get a little more felt for his arches. He notes pain along the inner side of his knee. He has a follow up with the foot MD this week. He is concerned about injury should he have to run or get in a fight for work as a .      OBJECTIVE      44 min Therapeutic Exercise:  [x] See flow sheet :   Rationale: increase ROM and increase strength to improve the patients ability to increase ease of ADLs        With   [x] TE   [] TA   [] neuro   [] other: Patient Education: [x] Review HEP    [] Progressed/Changed HEP based on:   [] positioning   [] body mechanics   [] transfers   [] heat/ice application    [] other:      Other Objective/Functional Measures:   Fatigue with hip strengthening with glute focus  Adductor fatigue/pain post table exercises and educated on standing stretch  Discussed in detail pt MRI for knee for better understanding of how alignment of the foot to neutral versus pes planus contributes to knee stability as well as hip strength  Challenged with BAPS CW/CCW without compensation using knee  Added HSC machine for posterior knee strengthening  ATC applied MCL tape for stability and gave pt new felt orthotics for shoes for temporary support  Decreased pain with short foot in standing with posterior fibula glide manually distally    Pain Level (0-10 scale) post treatment: 1/10    ASSESSMENT/Changes in Function: Pt is progressing well towards initial goals with overall reduction in knee and ankle pain. His biggest struggle is instability from longevity of brace use. He has decreased hip strength and pes planus causing medial knee collapse in standing. He has decreased inversion/eversion strength of the left ankle needed for stability for running/walking and potentially fighting on various surfaces for work as a . .          Progress towards goals / Updated goals:  Short Term Goals: To be accomplished in 2 treatments:  1. Patient will be compliant with HEP to increase ankle ROM and strength for ease of ADL's. MET  Long Term Goals: To be accomplished in 12 treatments:  1, Patient will increase FOTO score to 65/100 to show improved functional mobility. 2, Patient will increase left ankle strength to 4+/5 to improve standing/ambulatory tolerance. Progressing: left PF 4/5 (6/19/19)  3, Patient will report 60% improvement in symptoms to resume full duties at work. 4. Patient will report <2/10 ankle pain in order to improve QOL.  Progressing 1.5/10 (6/25/19)    PLAN  [x]  Upgrade activities as tolerated     [x]  Continue plan of care  []  Update interventions per flow sheet       []  Discharge due to:_  []  Other:_      Calos Corrales PTA 6/25/2019  11:00 AM    Future Appointments   Date Time Provider Tono Evans   6/25/2019  9:30 AM Diallo Becerril PTA [facility]   6/26/2019  8:45 AM Duyen Jolley MD [place] [facility] [address]   7/3/2019  9:00 AM Diallo Becerril PTA [facility]   7/5/2019  8:00 AM Parmjit Hannah PT [facility]

## 2019-06-26 ENCOUNTER — OFFICE VISIT (OUTPATIENT)
Dept: ORTHOPEDIC SURGERY | Age: 34
End: 2019-06-26

## 2019-06-26 VITALS
OXYGEN SATURATION: 98 % | WEIGHT: 251 LBS | RESPIRATION RATE: 11 BRPM | DIASTOLIC BLOOD PRESSURE: 93 MMHG | TEMPERATURE: 98 F | HEART RATE: 82 BPM | BODY MASS INDEX: 37.18 KG/M2 | SYSTOLIC BLOOD PRESSURE: 145 MMHG | HEIGHT: 69 IN

## 2019-06-26 DIAGNOSIS — S83.402D SPRAIN OF COLLATERAL LIGAMENT OF LEFT KNEE, SUBSEQUENT ENCOUNTER: ICD-10-CM

## 2019-06-26 DIAGNOSIS — S93.492D HIGH ANKLE SPRAIN, LEFT, SUBSEQUENT ENCOUNTER: ICD-10-CM

## 2019-06-26 DIAGNOSIS — S93.492D SPRAIN OF ANTERIOR TALOFIBULAR LIGAMENT OF LEFT ANKLE, SUBSEQUENT ENCOUNTER: Primary | ICD-10-CM

## 2019-06-26 NOTE — LETTER
NOTIFICATION RETURN TO WORK / SCHOOL 
 
6/26/2019 9:32 AM 
 
[facility] [id number] To Whom It May Concern: 
 
Vinny Baird is currently under the care of [address] [place] [address] Francisco Jean. Please allow Mr. Gautam Gaytan to continue light duty for the next 2.5 weeks. He is planned to have a return to work assessment in physical therapy soon. He is anticipated to return to work full duty on Monday, July 15th. His specific work limitations and levels of restrictions will be determined after his physical therapy return to work evaluation. If there are questions or concerns please have the patient contact our office.  
 
 
 
Sincerely, 
 
 
Sami Yancey MD

## 2019-06-26 NOTE — PROGRESS NOTES
AMBULATORY PROGRESS NOTE      Patient: Nikolai Haile             MRN: [id number]     SSN: [id number] Body mass index is 37.07 kg/m². YOB: 1985     AGE: 34 y.o. EX: male    PCP: Sharlene Flores DO     IMPRESSION/DIAGNOSIS AND TREATMENT PLAN     DIAGNOSES  1. Sprain of anterior talofibular ligament of left ankle, subsequent encounter    2. High ankle sprain, left, subsequent encounter    3. Sprain of collateral ligament of left knee, subsequent encounter        Orders Placed This Encounter    REFERRAL TO PHYSICAL THERAPY       Plan:    1) Look into Shock Doctor ankle brace at [facility]. 2) Paperwork filled out in the office today. 3) Note to PT: Please document any deficiencies and assess if the patient is ready to return to work full duty. 4) Work Note: Please allow Mr. Tremayne Hunter to continue light duty for the next 2.5 weeks. He is planned to have a return to work assessment in physical therapy soon. He is anticipated to return to work full duty on Monday, July 15th. As of right now, his specific work limitations and levels of restrictions will be determined after his physical therapy return to work evaluation. RTO - 4 weeks // [phone]     HPI AND EXAMINATION     Nikolai Haile IS A 34 y.o. male who presents to my outpatient office for follow up of sprain of the ATFL of the left ankle and a sprain of the collateral ligament of the left knee. At the last visit, I provided a referral to Dr. Polly Cushing for the knee, provided a referral to physical therapy, and provided a work note. Date of injury: 10/23/2018. Since we saw him last, Mr. Tremayne Hunter states that he is doing much better and he denies any major pain or soreness at this time. He finds that when he has to go up steps continuously, he experiences pain, and he also notes that he experiences some pulling in his left knee still. But overall, he believes that he is doing much better.  He is unsure whether Dr. Georgina Vaca has cleared him to return to full duty work. 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 He notes that he still has a few more sessions left of PT. He is currently going to [employer] in [place] and believes he has another session on Friday. 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 Visit Vitals  BP (!) 145/93   Pulse 82   Temp 98 °F (36.7 °C) (Oral)   Resp 11   Ht 5' 9\" (1.753 m)   Wt 251 lb (113.9 kg)   SpO2 98%   BMI 37.07 kg/m²      Appearance: Alert, well appearing and pleasant patient who is in no distress, oriented to person, place/time, and who follows commands. This patient is accompanied in the examination room by his self. Dementia: no dementia  Psychiatric: Affect and mood are appropriate. Patient arrives to office via: without assistive device  HEENT: Head normocephalic & atraumatic. Both pupils are round, non icteric sclera              Eye: EOM are intact and sclera are clear               Neck: ROM WNL and JVD neck is not present              Hearings Intact, does not require hearing aid device  Respiratory: Breathing is unlabored without accessory chest muscle use  Cardiovascular/Peripheral Vascular: Normal Pulses to each foot     ANKLE/FOOT left     Gait: Normal  Tenderness: No tenderness posterolaterally with external rotation/stress test at this time. No tenderness to the lateral calcaneal wall at this time. Cutaneous: No swelling to the anterolateral ankle in comparison to right ankle at this time. Joint Motion: Full PF, inversion, and eversion                          Does not fully DF    Tenderness with inversion. Joint / Tendon Stability: No ankle or subtalar instability noted, good end point. Negative Pucker's sign                                            No peroneal sublux ability or dislocation  Alignment: Forefoot, Midfoot, Hindfoot WNL. Neuro Motor/Sensory: NL/NL.    Can double stance heel rise with pain in his posterolateral ankle. Vascular: NL foot/ankle pulses. Lymphatics: No extremity lymphedema, No calf swelling, no tenderness to calf muscles.     Knees:  Left       Cutaneous: Skin intact, no abrasions, blisters, wounds, erythema       Effusion: Is not present       Crepitus:  no PF joint crepitus       Tenderness: Mild medial joint line        Alignment of Knee: neutral when standing       ROM: full range of motion, can make figure four       Fullness or swelling: None to popliteal fossa region       Stability: No instability to anterior, posterior, varus, valgus stress testing       Thrust:  No varus thrust with gait       Neuro: Extensor mechanism is intact    CHART REVIEW     Past Medical History:   Diagnosis Date    Condyloma acuminatum     Environmental allergies     Groin pain     Plantar fasciitis     Tinea pedis      Current Outpatient Medications   Medication Sig    valACYclovir (VALTREX) 500 mg tablet Take  by mouth two (2) times a day.  ergocalciferol (ERGOCALCIFEROL) 50,000 unit capsule Take 1 Cap by mouth every seven (7) days.  guaiFENesin-codeine (CHERATUSSIN AC) 100-10 mg/5 mL solution Take 10 mL by mouth four (4) times daily as needed for Cough. Max Daily Amount: 40 mL.  valACYclovir (VALTREX) 1 gram tablet Take 1 Tab by mouth daily.  montelukast (SINGULAIR) 10 mg tablet Take 1 Tab by mouth daily.  ibuprofen (MOTRIN) 600 mg tablet Take 1 Tab by mouth every six (6) hours as needed for Pain. No current facility-administered medications for this visit.       Allergies   Allergen Reactions    Penicillins Rash, Swelling and Hives    Mold Extracts Rash    Peanut Rash and Swelling     Past Surgical History:   Procedure Laterality Date    HX OTHER SURGICAL      dental     Social History     Occupational History    Not on file   Tobacco Use    Smoking status: Never Smoker    Smokeless tobacco: Never Used   Substance and Sexual Activity    Alcohol use: No    Drug use: No    Sexual activity: Not on file     Family History   Problem Relation Age of Onset    Hypertension Mother     Diabetes Mother     Hypertension Father         REVIEW OF SYSTEMS : 6/26/2019  ALL BELOW ARE Negative except : SEE HPI     Constitutional: Negative for fever, chills and weight loss. Neg Weight Loss  Cardiovascular: Negative for chest pain, claudication and leg swelling. SOB, HURD   Gastrointestinal/Urological: Negative for  pain, N/V/D/C, Blood in stool or urine,dysuria                         Hematuria, Incontinence, pelvic pain  Musculoskeletal: see HPI. Neurological: Negative for dizziness and weakness, headaches,Visual Changes             Confusion,  Or Seizures,   Psychiatric/Behavioral: Negative for depression, memory loss and substance abuse. Extremities:  Negative for hair changes, rash or skin lesion changes. Hematologic: Negative for Bleeding problems, bruising, pallor or swollen lymph nodes. Peripheral Vascular: No calf pain, vascular vein tenderness to calf pain              No calf throbbing, posterior knee throbbing pain     DIAGNOSTIC IMAGING     No notes on file      Please see above section of this report. I have personally reviewed the results of the above study. The interpretation of this study is my professional opinion. Written by Andi Anderson, as dictated by Dr. Mecca Styles. I, Dr. Mecca Styles, confirm that all documentation is accurate.

## 2019-06-26 NOTE — PROGRESS NOTES
1. Have you been to the ER, urgent care clinic since your last visit? Hospitalized since your last visit? No    2. Have you seen or consulted any other health care providers outside of the [address] [place] [address] since your last visit? Include any pap smears or colon screening.  No

## 2019-06-26 NOTE — PATIENT INSTRUCTIONS
Look into Shock Doctor ankle sleeve at [facility]  Address: [address] [place] [address] Emily Perea [address] [place] [address], [place], [facility] [address]  Phone: [phone]

## 2019-06-26 NOTE — LETTER
NOTIFICATION RETURN TO WORK / SCHOOL 
 
6/26/2019 9:30 AM 
 
[facility] [id number] To Whom It May Concern: 
 
Ivanna Kelly is currently under the care of [address] [place] [address] Francisco Jean. Please allow Mr. Ivy Champion to continue light duty for the next 2.5 weeks. He is planned to have a return to work assessment in physical therapy soon. He is anticipated to return to work full duty on Monday, July 15th. If there are questions or concerns please have the patient contact our office.  
 
 
 
Sincerely, 
 
 
Faviola Alatorre MD

## 2019-07-03 ENCOUNTER — HOSPITAL ENCOUNTER (OUTPATIENT)
Dept: PHYSICAL THERAPY | Age: 34
Discharge: HOME | End: 2019-07-03
Payer: COMMERCIAL

## 2019-07-03 PROCEDURE — 97110 THERAPEUTIC EXERCISES: CPT

## 2019-07-03 NOTE — PROGRESS NOTES
PT DAILY TREATMENT NOTE 10-18    Patient Name: Tr Krueger  Date:7/3/2019  : 1985  [x]  Patient  Verified  Payor: Tamiko Dear / Plan: [employer] / Product Type: HMO /    In time: 9:00  Out time: 9:33  Total Treatment Time (min): 33  Visit #: 8 of 12  Treatment Area: Left ankle pain [M25.572]  Sprain of other ligament of left ankle, subsequent encounter [S93.492D]  Sprain of tibiofibular ligament of left ankle, subsequent encounter [S93.432D]    SUBJECTIVE  Pain Level (0-10 scale):  1/10  Any medication changes, allergies to medications, adverse drug reactions, diagnosis change, or new procedure performed?: [x] No    [] Yes (see summary sheet for update)  Subjective functional status/changes:   [] No changes reported  Pt. Reports he has been doing better. OBJECTIVE    33 min Therapeutic Exercise:  [x] See flow sheet :   Rationale: increase ROM and increase strength to improve the patients ability to increase ease of ADLs          With   [] TE   [] TA   [] neuro   [] other: Patient Education: [x] Review HEP    [] Progressed/Changed HEP based on:   [] positioning   [] body mechanics   [] transfers   [] heat/ice application    [] other:      Other Objective/Functional Measures:   Jogging: pain in left knee and ankle with light jogging  Tuck jump: pain in left knee, unable to perform correctly  Ankle MMT PF: 5/5 DF: 5/5 right: left: 5/5 DF: 5/5  Pain with walking lunges after 10 feet, pain in left knee  6 inch eccentric step downs: poor control with left and significant pain  Decreased single leg stability with rebounder on left compared to right  Pt. Continues to have pain with resisted IV/EV       Pain Level (0-10 scale) post treatment: 2/10    ASSESSMENT/Changes in Function:  Pt. Continues to progress slowly with physical therapy. He demonstrates improving ankle strength and stability but is still unsafe for return to work duties.  He has pain and decreased stability with lunges and light jogging. He also has poor form with jumping and eccentric step downs from 6 inch height. Patient will continue to benefit from skilled PT services to modify and progress therapeutic interventions, address functional mobility deficits, address ROM deficits, address strength deficits, analyze and address soft tissue restrictions, analyze and cue movement patterns, analyze and modify body mechanics/ergonomics and assess and modify postural abnormalities to attain remaining goals. Progress towards goals / Updated goals:  Short Term Goals: To be accomplished in 2 treatments:  1. Patient will be compliant with HEP to increase ankle ROM and strength for ease of ADL's. MET  Long Term Goals: To be accomplished in 12 treatments:  1, Patient will increase FOTO score to 65/100 to show improved functional mobility. 2, Patient will increase left ankle strength to 4+/5 to improve standing/ambulatory tolerance. Progressing: left PF 5/5 (7/3/19)  3, Patient will report 60% improvement in symptoms to resume full duties at work. 4. Patient will report <2/10 ankle pain in order to improve QOL.  Progressing 1.5/10 (6/25/19)        PLAN  []  Upgrade activities as tolerated     [x]  Continue plan of care  []  Update interventions per flow sheet       []  Discharge due to:_  []  Other:_      Ana Ames, PT 7/3/2019  9:01 AM    Future Appointments   Date Time Provider Tono Evans   7/5/2019  8:00 AM Elkin Lazo, PT [id number]   7/24/2019  8:45 AM Brandie Jolley MD [facility] [address]

## 2019-07-05 ENCOUNTER — HOSPITAL ENCOUNTER (OUTPATIENT)
Dept: PHYSICAL THERAPY | Age: 34
End: 2019-07-05
Payer: COMMERCIAL

## 2019-07-08 ENCOUNTER — HOSPITAL ENCOUNTER (OUTPATIENT)
Dept: PHYSICAL THERAPY | Age: 34
Discharge: HOME | End: 2019-07-08
Payer: COMMERCIAL

## 2019-07-08 PROCEDURE — 97110 THERAPEUTIC EXERCISES: CPT

## 2019-07-08 NOTE — PROGRESS NOTES
[facility]  [address] [place] [address]  [phone] fax    Physical Therapy Progress Note  Patient name: Emiliana Moraes Start of Care:  19   Referral source: Beatrice Cochran MD : 1985   Medical/Treatment Diagnosis: Left ankle pain [M25.572]  Sprain of other ligament of left ankle, subsequent encounter [S93.492D]  Sprain of tibiofibular ligament of left ankle, subsequent encounter [S93432D]  Payor: Ricarda Lovett / Plan: [address] [place] [address] [place] [employer] / Product Type: HMO /  Onset Date: 2018     Prior Hospitalization: see medical history Provider#: [id number]   Medications: Verified on Patient Summary List     Comorbidities: high blood pressure    Prior Level of Function: , ulises gardening          Visits from Start of Care: 9   Missed Visits: 0    Established Goals:         Excellent           Good         Limited           None  [x] Increased ROM   []  [x]  []  []  [x] Increased Strength  []  []  [x]  []  [x] Increased Mobility  []  [x]  []  []   [x] Decreased Pain   []  []  [x]  []  [] Decreased Swelling  []  []  []  []    Key Functional Changes:  Pt. Is  progressing slowly towards goals. Left ankle strength is improving with PF and DF at 5/5. IV/EV strength has some limitations at 4+/5. He is still unable to perform work duties. He was only able to jog a couple feet before limping significantly secondary to his knee pain. He was also unable to perform walking lunges for 10 feet secondary to knee pain. He has poor control and pain with 6 inch eccentric step downs. He was also unable to perform repetitive tuck jumps due to pain. FOTO score improved slightly to 50 points. He reports a 50% improvement in symptoms since [facility]. He has been compliant with his HEP and gives good effort during PT, but continues to have significant functional limitations secondary to his pain.  Skilled PT is medically necessary in order to improve left ankle and knee strength for decreased pain and return to full work duties. Updated Goals: to be achieved in 10 visits  1. Patient will improve improve FOTO score by 50 in order to demonstrate a significant improvement in function. 2. Patient will improve left ankle IV/EV strength to 5/5 in order to increase ease of ADLs. 3. Patient will perform 10 consecutive tuck jumps without compensations in order to increase ease of working. 4. Patient will run 50 feet without increased pain in order to increase ease of working. ASSESSMENT/RECOMMENDATIONS:  [x]Continue therapy per initial plan/protocol at a frequency of  2 x per week for 10 visits  []Continue therapy with the following recommended changes:_____________________      _____________________________________________________________________  []Discontinue therapy progressing towards or have reached established goals  []Discontinue therapy due to lack of appreciable progress towards goals  []Discontinue therapy due to lack of attendance or compliance  []Await Physician's recommendations/decisions regarding therapy  []Other:________________________________________________________________    Thank you for this referral.   Sree Tolbert, PT 7/8/2019 8:03 AM    NOTE TO PHYSICIAN:  Clarissa Jauregui [phone]   FAX TO [facility]: [phone]  If you are unable to process this request in 24 hours please contact our office: [phone] I have read the above report and request that my patient continue as recommended. ? I have read the above report and request that my patient continue therapy with the following changes/special instructions:____________________________________  ? I have read the above report and request that my patient be discharged from therapy.     Physicians signature: ______________________________Date: ______Time:______

## 2019-07-08 NOTE — PROGRESS NOTES
PT DAILY TREATMENT NOTE 10-18    Patient Name: Ilsa Mayen  Date:2019  : 1985  [x]  Patient  Verified  Payor: Renny Lockhart / Plan: [employer] / Product Type: HMO /    In time: 8:00  Out time: 8:45  Total Treatment Time (min): 45  Visit #: 9 of 12    Treatment Area: Left ankle pain [M25.572]  Sprain of other ligament of left ankle, subsequent encounter [S93.492D]  Sprain of tibiofibular ligament of left ankle, subsequent encounter [S93.432D]    SUBJECTIVE  Pain Level (0-10 scale):  1.5/10  Any medication changes, allergies to medications, adverse drug reactions, diagnosis change, or new procedure performed?: [x] No    [] Yes (see summary sheet for update)  Subjective functional status/changes:   [] No changes reported  Pt. Reports he had some more pain after last visit then it calmed back down    OBJECTIVE    45 min Therapeutic Exercise:  [x] See flow sheet :   Rationale: increase ROM and increase strength to improve the patients ability to increase ease of ADLs          With   [x] TE   [] TA   [] neuro   [] other: Patient Education: [x] Review HEP    [] Progressed/Changed HEP based on:   [] positioning   [] body mechanics   [] transfers   [] heat/ice application    [] other:      Other Objective/Functional Measures: FOTO: 50 points  Left ankle MMT DF: 5/5 IV: 4+/5 EV: 4+/5  % improvement in symptoms:  50%   Pt.  Was challenged with standing BAPS board    Pain Level (0-10 scale) post treatment: 2/10    ASSESSMENT/Changes in Function:     []  See Plan of Care  [x]  See progress note/recertification  []  See Discharge Summary         Progress towards goals / Updated goals:  See progress note    PLAN  []  Upgrade activities as tolerated     [x]  Continue plan of care  []  Update interventions per flow sheet       []  Discharge due to:_  []  Other:_      Nati Johnson, PT 2019  7:58 AM    Future Appointments   Date Time Provider Tono Evans   2019  8:00 AM Lucia Tay [facility]   7/24/2019  8:45 AM Arturo Jolley MD [address]

## 2019-07-12 ENCOUNTER — HOSPITAL ENCOUNTER (OUTPATIENT)
Dept: PHYSICAL THERAPY | Age: 34
Discharge: HOME | End: 2019-07-12
Payer: COMMERCIAL

## 2019-07-12 PROCEDURE — 97110 THERAPEUTIC EXERCISES: CPT

## 2019-07-12 NOTE — PROGRESS NOTES
PT DAILY TREATMENT NOTE 10-18    Patient Name: Marco Antonio Hodges  Date:2019  : 1985  [x]  Patient  Verified  Payor: Jose L Estrada / Plan: [employer] / Product Type: HMO /    In time:5:30  Out time: 6:14  Total Treatment Time (min): 44  Visit #: 10 of 12    Treatment Area: Left ankle pain [M25.572]  Sprain of other ligament of left ankle, subsequent encounter [S93.492D]  Sprain of tibiofibular ligament of left ankle, subsequent encounter [S93.432D]    SUBJECTIVE  Pain Level (0-10 scale): 1/10  Any medication changes, allergies to medications, adverse drug reactions, diagnosis change, or new procedure performed?: [x] No    [] Yes (see summary sheet for update)  Subjective functional status/changes:   [] No changes reported  Pt reports pain with med joint line of B knees and lat ankle after his prolonged walking    OBJECTIVE    Modality rationale: decrease edema, decrease inflammation and decrease pain to improve the patients ability to tolerate PT and amb   Min Type Additional Details    [] Estim:  []Unatt       []IFC  []Premod                        []Other:  []w/ice   []w/heat  Position:  Location:    [] Estim: []Att    []TENS instruct  []NMES                    []Other:  []w/US   []w/ice   []w/heat  Position:  Location:    []  Traction: [] Cervical       []Lumbar                       [] Prone          []Supine                       []Intermittent   []Continuous Lbs:  [] before manual  [] after manual    []  Ultrasound: []Continuous   [] Pulsed                           []1MHz   []3MHz W/cm2:  Location:    []  Iontophoresis with dexamethasone         Location: [] Take home patch   [] In clinic   5 [x]  Ice     []  heat  []  Ice massage  []  Laser   []  Anodyne Position: long sitting  Location: Left ankle and knee    []  Laser with stim  []  Other:  Position:  Location:    []  Vasopneumatic Device Pressure:       [] lo [] med [] hi   Temperature: [] lo [] med [] hi   [x] Skin assessment post-treatment:  [x]intact []redness- no adverse reaction    []redness - adverse reaction:        39 min Therapeutic Exercise:  [x] See flow sheet :   Rationale: increase ROM and increase strength to improve the patients ability to increase ease of ADLs         With   [] TE   [] TA   [] neuro   [] other: Patient Education: [x] Review HEP    [] Progressed/Changed HEP based on:   [] positioning   [] body mechanics   [] transfers   [] heat/ice application    [] other:      Other Objective/Functional Measures:    Max burning with hip abd, mod challenged with hip add in sidelying   Mod sharp pain  with IV and EV using TB    Cont to demonstrates varus collapse of B knees with monster walk     Pain Level (0-10 scale) post treatment: 1/10    ASSESSMENT/Changes in Function: pt fluctuating with min pain but cont to demonstrate fair strength of B hips and ankles. Pt also demonstrates fair tolerance to strengthening therex today. Educated on footwear with good support and overall posture. Will cont to progress  therex for strengthening and stability of Left LE as tolerated. Patient will continue to benefit from skilled PT services to modify and progress therapeutic interventions, address functional mobility deficits, address ROM deficits, address strength deficits, analyze and address soft tissue restrictions, analyze and cue movement patterns, analyze and modify body mechanics/ergonomics and assess and modify postural abnormalities to attain remaining goals. Progress towards goals / Updated goals:  Updated Goals: to be achieved in 10 visits  1. Patient will improve improve FOTO score by 50 in order to demonstrate a significant improvement in function. 2. Patient will improve left ankle IV/EV strength to 5/5 in order to increase ease of ADLs. 3. Patient will perform 10 consecutive tuck jumps without compensations in order to increase ease of working.    4. Patient will run 50 feet without increased pain in order to increase ease of working.        PLAN  [x]  Upgrade activities as tolerated     [x]  Continue plan of care  []  Update interventions per flow sheet       []  Discharge due to:_  []  Other:_       David Polanco, PT 7/12/2019  10:00 AM    Future Appointments   Date Time Provider Tono Evans   7/12/2019  5:30 PM Fransisco Taya [facility]   7/17/2019 10:30 AM Marc Rea, PT [facility]   7/18/2019 12:00 PM Jace Jameson, PTA [facility]   7/22/2019 12:00 PM Jace Jameson, PTA [facility]   7/23/2019  8:30 AM Jace Jameson, PTA [facility]   7/24/2019  8:45 AM Robin Jolley MD [facility]   7/31/2019  9:00 AM Marc Rea, PT [facility]   8/1/2019 11:00 AM Marc Rea, PT [facility]

## 2019-07-18 ENCOUNTER — APPOINTMENT (OUTPATIENT)
Dept: PHYSICAL THERAPY | Age: 34
End: 2019-07-18
Payer: COMMERCIAL

## 2019-07-22 ENCOUNTER — HOSPITAL ENCOUNTER (OUTPATIENT)
Dept: PHYSICAL THERAPY | Age: 34
Discharge: HOME | End: 2019-07-22
Payer: COMMERCIAL

## 2019-07-22 PROCEDURE — 97110 THERAPEUTIC EXERCISES: CPT

## 2019-07-22 NOTE — PROGRESS NOTES
PT DAILY TREATMENT NOTE 10-18    Patient Name: Edilma Mac  Date:2019  : 1985  [x]  Patient  Verified  Payor: Grant Miller / Plan: [employer] / Product Type: HMO /    In time: 11:54  Out time:12:38  Total Treatment Time (min): 44  Visit #: 11 of 12    Treatment Area: Left ankle pain [M25.572]  Sprain of other ligament of left ankle, subsequent encounter [S93.492D]  Sprain of tibiofibular ligament of left ankle, subsequent encounter [S93.432D]    SUBJECTIVE  Pain Level (0-10 scale): 1/10  Any medication changes, allergies to medications, adverse drug reactions, diagnosis change, or new procedure performed?: [x] No    [] Yes (see summary sheet for update)  Subjective functional status/changes:   [] No changes reported  Reports ankle is feeling a lot better. Just gets stiff because he hasn't been moving much at home. Pt has a new knee brace for left knee and reports it is helping with left knee symptoms. OBJECTIVE    Modality rationale: decrease inflammation and decrease pain to improve the patients ability to perform ADL's with ease.    Min Type Additional Details    [] Estim:  []Unatt       []IFC  []Premod                        []Other:  []w/ice   []w/heat  Position:  Location:    [] Estim: []Att    []TENS instruct  []NMES                    []Other:  []w/US   []w/ice   []w/heat  Position:  Location:    []  Traction: [] Cervical       []Lumbar                       [] Prone          []Supine                       []Intermittent   []Continuous Lbs:  [] before manual  [] after manual    []  Ultrasound: []Continuous   [] Pulsed                           []1MHz   []3MHz W/cm2:  Location:    []  Iontophoresis with dexamethasone         Location: [] Take home patch   [] In clinic    [x]  Ice     []  heat  []  Ice massage  []  Laser   []  Anodyne Position: supine  Location: Left ankle    []  Laser with stim  []  Other:  Position:  Location:    []  Vasopneumatic Device Pressure:       [] lo [] med [] hi   Temperature: [] lo [] med [] hi   [] Skin assessment post-treatment:  []intact []redness- no adverse reaction    []redness - adverse reaction:     30 min Therapeutic Exercise:  [x] See flow sheet :   Rationale: increase ROM and increase strength to improve the patients ability to perform ADL's with ease and increase functional mobility for work duties. With   [x] TE   [] TA   [] neuro   [] other: Patient Education: [x] Review HEP    [] Progressed/Changed HEP based on:   [] positioning   [] body mechanics   [] transfers   [] heat/ice application    [] other:      Other Objective/Functional Measures:  Reports tension on ankle during elliptical  Pt requires VC with lunges and squats: advised to pushed hips back and to avoid getting up on toes with the knee driving forward  Pt displays minor instability with star cone taps  Pt unable to perform lateral step over with 12\" willie, performed with 4\"  Pt glutes weak     Pain Level (0-10 scale) post treatment: 3/10    ASSESSMENT/Changes in Function: Pt progressing with therapy and towards goals. Pt progressed therapeutic exercises increasing ability to perform functional activities including jogging, lunging, squatting, and skipping. Pt continues to have poor biomechanical form during functional movements requiring frequent verbal cueing. Pt glute musculature is weak both in extension and abduction. Pt activity tolerance has increased slightly but overall endurance is poor and pt has been encouraged to participate in walking program. Pt ankle stability and balance is increasing but continues to have increased symptoms with dynamic activities on unstable surfaces.     Patient will continue to benefit from skilled PT services to modify and progress therapeutic interventions, address functional mobility deficits, address ROM deficits, address strength deficits, analyze and cue movement patterns and analyze and modify body mechanics/ergonomics to attain remaining goals. [x]  See Plan of Care  []  See progress note/recertification  []  See Discharge Summary         Progress towards goals / Updated goals:  Updated Goals: to be achieved in 10 visits  1. Patient will improve improve FOTO score by 50 in order to demonstrate a significant improvement in function. 2. Patient will improve left ankle IV/EV strength to 5/5 in order to increase ease of ADLs. 3. Patient will perform 10 consecutive tuck jumps without compensations in order to increase ease of working.    4. Patient will run 50 feet without increased pain in order to increase ease of working.     PLAN  [x]  Upgrade activities as tolerated     []  Continue plan of care  []  Update interventions per flow sheet       []  Discharge due to:_  []  Other:_      Tomer Mccray 7/22/2019  11:48 AM    Future Appointments   Date Time Provider Tono Evans   7/22/2019 12:00 PM Clif López PT [facility]   7/23/2019  8:30 AM Morenita Leija PTA [facility]   7/24/2019  8:45 AM Ej Jolley MD [address]   7/31/2019  9:00 AM Clif López PT [facility]   8/1/2019 11:00 AM Clif López PT [facility]

## 2019-07-23 ENCOUNTER — HOSPITAL ENCOUNTER (OUTPATIENT)
Dept: PHYSICAL THERAPY | Age: 34
Discharge: HOME | End: 2019-07-23
Payer: COMMERCIAL

## 2019-07-23 PROCEDURE — 97110 THERAPEUTIC EXERCISES: CPT

## 2019-07-23 NOTE — PROGRESS NOTES
PT DAILY TREATMENT NOTE 10-18    Patient Name: Edilma Mac  Date:2019  : 1985  [x]  Patient  Verified  Payor: Grant Angela / Plan: [address] [place] H[employer] / Product Type: HMO /    In time: 8:33 Out time: 9:03  Total Treatment Time (min): 30  Visit #: 1 of 10    Treatment Area: Left ankle pain [M25.572]  Sprain of other ligament of left ankle, subsequent encounter [S93.492D]  Sprain of tibiofibular ligament of left ankle, subsequent encounter [S93.432D]    SUBJECTIVE  Pain Level (0-10 scale): 0.5/10  Any medication changes, allergies to medications, adverse drug reactions, diagnosis change, or new procedure performed?: [x] No    [] Yes (see summary sheet for update)  Subjective functional status/changes:   [] No changes reported  Pt reports not too much pain in the ankle. He is able to do a little light running with the knee brace but feels pain on the side of his left ankle when he tries. He is working on exercises at home. OBJECTIVE    25 min Therapeutic Exercise:  [x] See flow sheet :   Rationale: increase ROM and increase strength to improve the patients ability to perform ADL's with ease and increase functional mobility for work duties. 5 minutes to do a posterior glide to the distal fibula taping for support to assist with walking and running with less pain      With   [x] TE   [] TA   [] neuro   [] other: Patient Education: [x] Review HEP    [] Progressed/Changed HEP based on:   [] positioning   [] body mechanics   [] transfers   [] heat/ice application    [] other:      Other Objective/Functional Measures:  Pain with attempt at inversion/eversion on BAPS level 1 in standing  Switched to table exercises for glutes  No LOB with MSR foam EO/EC  Educated on continued ankle strengthening and short foot exercising for stability       Pain Level (0-10 scale) post treatment: 0.5/10    ASSESSMENT/Changes in Function: Pt making slow, steady progress towards goals.  He has had reduced pain overall but still has decreased inversion/eversion strength with poor arch stability in weight bearing. He has been able to do some light running but not without increased pain. Will continue working on strengthening to improve ease of return to work duties like running and jumping without re-injury or pain. Progress towards goals / Updated goals:  Updated Goals: to be achieved in 10 visits  1. Patient will improve improve FOTO score by 50 in order to demonstrate a significant improvement in function. 2. Patient will improve left ankle IV/EV strength to 5/5 in order to increase ease of ADLs. 3. Patient will perform 10 consecutive tuck jumps without compensations in order to increase ease of working.    4. Patient will run 50 feet without increased pain in order to increase ease of working.     PLAN  [x]  Upgrade activities as tolerated     [x]  Continue plan of care  []  Update interventions per flow sheet       []  Discharge due to:_  []  Other:_      Alexandria Larkin PTA 7/23/2019  11:48 AM    Future Appointments   Date Time Provider Tono Evans   7/23/2019  8:30 AM [facility] [address]   7/24/2019  8:45 AM Merritt Jolley MD [address]   7/31/2019  9:00 AM Kennedy Villa PT [facility] [address]   8/1/2019 11:00 AM Kennedy Villa PT [facility] [address]

## 2019-07-24 ENCOUNTER — OFFICE VISIT (OUTPATIENT)
Dept: ORTHOPEDIC SURGERY | Age: 34
End: 2019-07-24

## 2019-07-24 VITALS
SYSTOLIC BLOOD PRESSURE: 141 MMHG | OXYGEN SATURATION: 100 % | DIASTOLIC BLOOD PRESSURE: 93 MMHG | RESPIRATION RATE: 15 BRPM | HEIGHT: 69 IN | WEIGHT: 253.8 LBS | HEART RATE: 88 BPM | TEMPERATURE: 96 F | BODY MASS INDEX: 37.59 KG/M2

## 2019-07-24 DIAGNOSIS — S93.492D SPRAIN OF ANTERIOR TALOFIBULAR LIGAMENT OF LEFT ANKLE, SUBSEQUENT ENCOUNTER: Primary | ICD-10-CM

## 2019-07-24 DIAGNOSIS — S83.402D SPRAIN OF COLLATERAL LIGAMENT OF LEFT KNEE, SUBSEQUENT ENCOUNTER: ICD-10-CM

## 2019-07-24 NOTE — PATIENT INSTRUCTIONS
Ankle Sprain: Care Instructions  Your Care Instructions    An ankle sprain can happen when you twist your ankle. The ligaments that support the ankle can get stretched and torn. Often the ankle is swollen and painful. Ankle sprains may take from several weeks to several months to heal. Usually, the more pain and swelling you have, the more severe your ankle sprain is and the longer it will take to heal. You can heal faster and regain strength in your ankle with good home treatment. It is very important to give your ankle time to heal completely, so that you do not easily hurt your ankle again. Follow-up care is a key part of your treatment and safety. Be sure to make and go to all appointments, and call your doctor if you are having problems. It's also a good idea to know your test results and keep a list of the medicines you take. How can you care for yourself at home? · Prop up your foot on pillows as much as possible for the next 3 days. Try to keep your ankle above the level of your heart. This will help reduce the swelling. · Follow your doctor's directions for wearing a splint or elastic bandage. Wrapping the ankle may help reduce or prevent swelling. · Your doctor may give you a splint, a brace, an air stirrup, or another form of ankle support to protect your ankle until it is healed. Wear it as directed while your ankle is healing. Do not remove it unless your doctor tells you to. After your ankle has healed, ask your doctor whether you should wear the brace when you exercise. · Put ice or cold packs on your injured ankle for 10 to 20 minutes at a time. Try to do this every 1 to 2 hours for the next 3 days (when you are awake) or until the swelling goes down. Put a thin cloth between the ice and your skin. · You may need to use crutches until you can walk without pain. If you do use crutches, try to bear some weight on your injured ankle if you can do so without pain.  This helps the ankle heal.  · Take pain medicines exactly as directed. ? If the doctor gave you a prescription medicine for pain, take it as prescribed. ? If you are not taking a prescription pain medicine, ask your doctor if you can take an over-the-counter medicine. · If you have been given ankle exercises to do at home, do them exactly as instructed. These can promote healing and help prevent lasting weakness. When should you call for help? Call your doctor now or seek immediate medical care if:    · Your pain is getting worse.     · Your swelling is getting worse.     · Your splint feels too tight or you are unable to loosen it.    Watch closely for changes in your health, and be sure to contact your doctor if:    · You are not getting better after 1 week. Where can you learn more? Go to http://paulina-cale.info/. Enter B599 in the search box to learn more about \"Ankle Sprain: Care Instructions. \"  Current as of: September 20, 2018  Content Version: 12.1  © 9651-5235 [employer], [employer]. Care instructions adapted under license by [employer] (which disclaims liability or warranty for this information). If you have questions about a medical condition or this instruction, always ask your healthcare professional. [address] any warranty or liability for your use of this information.

## 2019-07-24 NOTE — PROGRESS NOTES
AMBULATORY PROGRESS NOTE      Patient: Yanira Renteria             MRN: [id number]     SSN: [id number] Body mass index is 37.48 kg/m². YOB: 1985     AGE: 34 y.o. EX: male    PCP: Atiya Garcia DO     IMPRESSION/DIAGNOSIS AND TREATMENT PLAN     DIAGNOSES  1. Sprain of anterior talofibular ligament of left ankle, subsequent encounter    2. Sprain of collateral ligament of left knee, subsequent encounter        No orders of the defined types were placed in this encounter. Yanira Renteria understands his proposed treatment plan and diagnoses. So, I am really hoping to get him back to work at full duty in about one month. He is still in physical therapy advancing slowly. They have him running out short distances. The goal is to get him running and jumping pain-free. My impression is anterolateral left ankle sprain without gross instability. There is no evidence of varus or valgus instability and no evidence of multi-directional instability. Left knee strain, resolving. His examination was quite benign today, but my examine is not subjected to the strenuous loads that he endures running and jumping and with arduous activities that his job requires him to perform. So, we are continuing the physical therapy, but I am really hoping to get him back to work full duty in about one months' time. Plan:    1) Work Note: Please allow Mr. Doreen Soliz to continue desk duty for now. He is making steady progress, but he is not yet fit for full, active duty. He is anticipated to be ready for active duty in the next four weeks. 2) Continue PT. 3) Continue activity modification as directed. RTO - 4 weeks     HPI AND EXAMINATION     Yanira Renteria IS A 34 y.o. male who presents to my outpatient office for follow up of sprain of the ATFL of the left ankle and a sprain of the collateral ligament of the left knee.  At the last visit, I provided a note to PT as well as a work note. Date of injury: 10/23/2018. Since we saw him last, Mr. Aneesh Ross states that he is doing well and denies any major pain or soreness. He notes that he only experiences pain or soreness when he is on his feet a lot or when he works his left ankle or knee at PT. He notes that he will experience sharp pains when at PT. He specifies that he experiences the pain in his medial and lateral knee, which he notes is intermittent and transient. He states that he also experiences popping in his lateral ankle. He adds that he has not had a readiness/return to work assessment at PT. He reports that he has been performing his exercises at PT with his knee brace but without his ankle brace. He notes that he normally wears these two braces, and he finds that they give him a lot of support. Mr. Aneesh Ross presents to the office with his braces and with KT tape, which he states was put on him by his physical therapist. He goes to therapy at [facility] off of [address] [facility]. He is currently on desk duty at work. He reports that he works 12 hour shifts. 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 Visit Vitals  BP (!) 141/93   Pulse 88   Temp 96 °F (35.6 °C) (Oral)   Resp 15   Ht 5' 9\" (1.753 m)   Wt 253 lb 12.8 oz (115.1 kg)   SpO2 100%   BMI 37.48 kg/m²      Appearance: Alert, well appearing and pleasant patient who is in no distress, oriented to person, place/time, and who follows commands. This patient is accompanied in the examination room by his self. Dementia: no dementia  Psychiatric: Affect and mood are appropriate. Patient arrives to office via: without assistive device  HEENT: Head normocephalic & atraumatic.  Both pupils are round, non icteric sclera              Eye: EOM are intact and sclera are clear               Neck: ROM WNL and JVD neck is not present              Hearings Intact, does not require hearing aid device  Respiratory: Breathing is unlabored without accessory chest muscle use  Cardiovascular/Peripheral Vascular: Normal Pulses to each foot     ANKLE/FOOT left     Gait: Normal  Tenderness: No tenderness posterolaterally with external rotation/stress test at this time. No tenderness to the lateral calcaneal wall at this time. Cutaneous: No swelling to the anterolateral ankle in comparison to right ankle at this time. No effusion, no redness  Joint Motion: Full PF, inversion, and eversion                          Does not fully DF    Tenderness with inversion. Joint / Tendon Stability: No ankle or subtalar instability noted, good end point, no gross instability           Negative Pucker's sign                                            No peroneal sublux ability or dislocation  Alignment: Forefoot, Midfoot, Hindfoot WNL. Neuro Motor/Sensory: NL/NL. Can double stance heel rise with pain in his posterolateral ankle. Vascular: NL foot/ankle pulses. Lymphatics: No extremity lymphedema, No calf swelling, no tenderness to calf muscles.     Knees:  Left       Cutaneous: Skin intact, no abrasions, blisters, wounds, erythema       Effusion: Is not present       Crepitus:  no PF joint crepitus       Tenderness: No tenderness at this time. Alignment of Knee: neutral when standing       ROM: full range of motion, can make figure four       Fullness or swelling: None to popliteal fossa region       Stability: No instability to anterior, posterior, varus, valgus stress testing       Thrust:  No varus thrust with gait       Neuro: Extensor mechanism is intact    CHART REVIEW     Past Medical History:   Diagnosis Date    Condyloma acuminatum     Environmental allergies     Groin pain     Plantar fasciitis     Tinea pedis      Current Outpatient Medications   Medication Sig    valACYclovir (VALTREX) 500 mg tablet Take  by mouth two (2) times a day.  ergocalciferol (ERGOCALCIFEROL) 50,000 unit capsule Take 1 Cap by mouth every seven (7) days.     guaiFENesin-codeine (CHERATUSSIN AC) 100-10 mg/5 mL solution Take 10 mL by mouth four (4) times daily as needed for Cough. Max Daily Amount: 40 mL.  valACYclovir (VALTREX) 1 gram tablet Take 1 Tab by mouth daily.  montelukast (SINGULAIR) 10 mg tablet Take 1 Tab by mouth daily.  ibuprofen (MOTRIN) 600 mg tablet Take 1 Tab by mouth every six (6) hours as needed for Pain. No current facility-administered medications for this visit. Allergies   Allergen Reactions    Penicillins Rash, Swelling and Hives    Mold Extracts Rash    Peanut Rash and Swelling     Past Surgical History:   Procedure Laterality Date    HX OTHER SURGICAL      dental     Social History     Occupational History    Not on file   Tobacco Use    Smoking status: Never Smoker    Smokeless tobacco: Never Used   Substance and Sexual Activity    Alcohol use: No    Drug use: No    Sexual activity: Not on file     Family History   Problem Relation Age of Onset    Hypertension Mother     Diabetes Mother     Hypertension Father         REVIEW OF SYSTEMS : 7/24/2019  ALL BELOW ARE Negative except : SEE HPI     Constitutional: Negative for fever, chills and weight loss. Neg Weight Loss  Cardiovascular: Negative for chest pain, claudication and leg swelling. SOB, HURD   Gastrointestinal/Urological: Negative for  pain, N/V/D/C, Blood in stool or urine,dysuria                         Hematuria, Incontinence, pelvic pain  Musculoskeletal: see HPI. Neurological: Negative for dizziness and weakness, headaches,Visual Changes             Confusion,  Or Seizures,   Psychiatric/Behavioral: Negative for depression, memory loss and substance abuse. Extremities:  Negative for hair changes, rash or skin lesion changes. Hematologic: Negative for Bleeding problems, bruising, pallor or swollen lymph nodes.   Peripheral Vascular: No calf pain, vascular vein tenderness to calf pain              No calf throbbing, posterior knee throbbing pain DIAGNOSTIC IMAGING     No notes on file      Please see above section of this report. I have personally reviewed the results of the above study. The interpretation of this study is my professional opinion. Written by Suzy Woodward, as dictated by Dr. Cody Hamilton. I, Dr. Cody Hamilton, confirm that all documentation is accurate.

## 2019-07-24 NOTE — PROGRESS NOTES
1. Have you been to the ER, urgent care clinic since your last visit? Hospitalized since your last visit? No    2. Have you seen or consulted any other health care providers outside of the [address] [place] [address] since your last visit? Include any pap smears or colon screening.  No

## 2019-07-31 ENCOUNTER — APPOINTMENT (OUTPATIENT)
Dept: PHYSICAL THERAPY | Age: 34
End: 2019-07-31
Payer: COMMERCIAL

## 2019-08-01 ENCOUNTER — APPOINTMENT (OUTPATIENT)
Dept: PHYSICAL THERAPY | Age: 34
End: 2019-08-01
Payer: COMMERCIAL

## 2019-08-09 ENCOUNTER — HOSPITAL ENCOUNTER (OUTPATIENT)
Dept: PHYSICAL THERAPY | Age: 34
Discharge: HOME | End: 2019-08-09
Payer: COMMERCIAL

## 2019-08-09 PROCEDURE — 97110 THERAPEUTIC EXERCISES: CPT

## 2019-08-09 NOTE — PROGRESS NOTES
[facility] - Laura Gentle  [address] [facility]  [phone] fax    Physical Therapy Progress Note  Patient name: Veda Hobbs Start of Care: 19   Referral source: Carole Guadarrama MD : 1985   Medical/Treatment Diagnosis: Left ankle pain [M25.572]  Sprain of other ligament of left ankle, subsequent encounter [S93.492D]  Sprain of tibiofibular ligament of left ankle, subsequent encounter [S93432D]  Payor: Tramaine Verde / Plan: [employer] HMO / Product Type: HMO /  Onset Date: 2018     Prior Hospitalization: see medical history Provider#: [id number]   Medications: Verified on Patient Summary List    Comorbidities: HTN  Prior Level of Function: , enjoy gardening  Visits from [place] of Care: 13    Missed Visits: 0    Established Goals:         Excellent           Good         Limited           None  [x] Increased ROM   []  []  [x]  []  [x] Increased Strength  []  []  [x]  []  [x] Increased Mobility  []  []  [x]  []   [x] Decreased Pain   []  [x]  []  []  [] Decreased Swelling  []  []  []  []    Key Functional Changes: Improved overall mobility, improvement in symptoms, improvement in biomechanics  Updated Goals: to be achieved in 8 visits:  1. Patient will improve improve FOTO score by 50 in order to demonstrate a significant improvement in function. 2. Patient will be able to perform 5-10-5 shuttle without increase in symptoms to demonstrate ability to return to work. 3. Patient will perform 20 consecutive tuck jumps without compensations in order to increase ease of working. 4. Patient will run 50 feet without increased pain in order to increase ease of working.     ASSESSMENT/RECOMMENDATIONS: Pt has progressed with therapy and is advancing therapeutic exercises. He demonstrated increased ease of jogging short distances and with lunges today.  Pt continues to struggle with higher level mobility including running, jumping, skipping, lateral movements, agility, and plyometrics due to left knee and ankle pain. 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 He has genu valgus with squatting and single leg activities. Pt is also deconditioned despite therapist emphasis on walking program and participation in HEP to accelerate return to PLOF. Pt HEP has been updated and additions have been made emphasizing proximal LE strengthening to correct knee position during movements. Pt has had improvement in symptoms at rest and with low level/impact activity. Skilled physical therapy is recommended to address the impairments and functional limitations discussed above to facilitate return work and restore pt to PLOF. [x]Continue therapy per initial plan/protocol at a frequency of  2 x per week for 4 weeks  []Continue therapy with the following recommended changes:_____________________      _____________________________________________________________________  []Discontinue therapy progressing towards or have reached established goals  []Discontinue therapy due to lack of appreciable progress towards goals  []Discontinue therapy due to lack of attendance or compliance  []Await Physician's recommendations/decisions regarding therapy  []Other:________________________________________________________________    Thank you for this referral.   Joaquin Alberto 8/9/2019 2:57 PM    NOTE TO PHYSICIAN:  [address] AND   FAX TO [facility]: [phone]  If you are unable to process this request in 24 hours please contact our office: [phone] I have read the above report and request that my patient continue as recommended.   ? I have read the above report and request that my patient continue therapy with the following changes/special instructions:____________________________________  ? I have read the above report and request that my patient be discharged from therapy.     Physicians signature: ______________________________Date: ______Time:______

## 2019-08-09 NOTE — PROGRESS NOTES
PT DAILY TREATMENT NOTE 10-18    Patient Name: Rafael Tapia  Date:2019  : 1985  [x]  Patient  Verified  Payor: Denia Manawa / Plan: Scott Manzo HMO / Product Type: HMO /    In time: 2:00  Out time: 2:55  Total Treatment Time (min): 55  Visit #: 2 of 10    Treatment Area: Left ankle pain [M25.572]  Sprain of other ligament of left ankle, subsequent encounter [S93.492D]  Sprain of tibiofibular ligament of left ankle, subsequent encounter [S93.432D]    SUBJECTIVE  Pain Level (0-10 scale): 1.5/10  Any medication changes, allergies to medications, adverse drug reactions, diagnosis change, or new procedure performed?: [x] No    [] Yes (see summary sheet for update)  Subjective functional status/changes:   [] No changes reported  Pt reports braces are extremely helpful. Pt states he just came from [employer] and the hard floor is tough on the ankle. Pt states sleep has been difficult with . Pt has not walked as much as he should've. OBJECTIVE    Modality rationale: decrease inflammation and decrease pain to improve the patients ability to perform ADL's and work function.    Min Type Additional Details    [] Estim:  []Unatt       []IFC  []Premod                        []Other:  []w/ice   []w/heat  Position:  Location:    [] Estim: []Att    []TENS instruct  []NMES                    []Other:  []w/US   []w/ice   []w/heat  Position:  Location:    []  Traction: [] Cervical       []Lumbar                       [] Prone          []Supine                       []Intermittent   []Continuous Lbs:  [] before manual  [] after manual    []  Ultrasound: []Continuous   [] Pulsed                           []1MHz   []3MHz W/cm2:  Location:    []  Iontophoresis with dexamethasone         Location: [] Take home patch   [] In clinic   10 [x]  Ice     []  heat  []  Ice massage  []  Laser   []  Anodyne Position: Supine  Location: Left knee and ankle    []  Laser with stim  []  Other:  Position:  Location:    [] Vasopneumatic Device Pressure:       [] lo [] med [] hi   Temperature: [] lo [] med [] hi   [] Skin assessment post-treatment:  []intact []redness- no adverse reaction    []redness - adverse reaction:     45 min Therapeutic Exercise:  [x] See flow sheet :   Rationale: increase ROM and increase strength to improve the patients ability to perform ADL's with ease and improve work function. With   [x] TE   [] TA   [] neuro   [] other: Patient Education: [x] Review HEP    [] Progressed/Changed HEP based on:   [] positioning   [] body mechanics   [] transfers   [] heat/ice application    [] other:      Other Objective/Functional Measures:  Pt dynamic warm up included: jog, backwards jog, skip (increase P!), lateral shuffle, karaoke, lunges, and forward hops  Pt unable to perform skipping w/o increased symptoms  Lateral movement is fairly tolerated  Pt tolerated eccentric leg extension and leg press  Pt required lots of verbal/tactile cueing on knee position during SL eccentric stepdown and squats, pt responded to use of TB to avoid valgus collapse. Pain Level (0-10 scale) post treatment: 0/10    ASSESSMENT/Changes in Function: See progress note    Patient will continue to benefit from skilled PT services to modify and progress therapeutic interventions, address functional mobility deficits, address ROM deficits, address strength deficits, analyze and address soft tissue restrictions, analyze and cue movement patterns and analyze and modify body mechanics/ergonomics to attain remaining goals. []  See Plan of Care  [x]  See progress note/recertification  []  See Discharge Summary         Progress towards goals / Updated goals:  Updated Goals: to be achieved in 10 visits  1. Patient will improve improve FOTO score by 50 in order to demonstrate a significant improvement in function. 2. Patient will improve left ankle IV/EV strength to 5/5 in order to increase ease of ADLs.    3. Patient will perform 10 consecutive tuck jumps without compensations in order to increase ease of working. Met 8/9/19  4.  Patient will run 50 feet without increased pain in order to increase ease of working.   Progressing 8/9/19    PLAN  [x]  Upgrade activities as tolerated     [x]  Continue plan of care  []  Update interventions per flow sheet       []  Discharge due to:_  []  Other:_      Jennifer Shah 8/9/2019  2:02 PM    Future Appointments   Date Time Provider Tono Evans   8/19/2019  8:50 AM Brandie Jolley MD [facility] [address]

## 2019-08-19 ENCOUNTER — OFFICE VISIT (OUTPATIENT)
Dept: ORTHOPEDIC SURGERY | Age: 34
End: 2019-08-19

## 2019-08-19 VITALS
SYSTOLIC BLOOD PRESSURE: 128 MMHG | OXYGEN SATURATION: 97 % | WEIGHT: 252 LBS | DIASTOLIC BLOOD PRESSURE: 82 MMHG | HEIGHT: 69 IN | BODY MASS INDEX: 37.33 KG/M2 | TEMPERATURE: 96.9 F | HEART RATE: 75 BPM

## 2019-08-19 DIAGNOSIS — S93.492D HIGH ANKLE SPRAIN, LEFT, SUBSEQUENT ENCOUNTER: ICD-10-CM

## 2019-08-19 DIAGNOSIS — S83.402D SPRAIN OF COLLATERAL LIGAMENT OF LEFT KNEE, SUBSEQUENT ENCOUNTER: ICD-10-CM

## 2019-08-19 DIAGNOSIS — S93.492D SPRAIN OF ANTERIOR TALOFIBULAR LIGAMENT OF LEFT ANKLE, SUBSEQUENT ENCOUNTER: Primary | ICD-10-CM

## 2019-08-19 NOTE — PROGRESS NOTES
AMBULATORY PROGRESS NOTE      Patient: Duncan Andrea             MRN: [id number]     SSN: [id number] Body mass index is 37.21 kg/m². YOB: 1985     AGE: 30 y.o. EX: male    PCP: Jayson Szymanski DO     IMPRESSION/DIAGNOSIS AND TREATMENT PLAN     DIAGNOSES  1. Sprain of anterior talofibular ligament of left ankle, subsequent encounter    2. Sprain of collateral ligament of left knee, subsequent encounter    3. High ankle sprain, left, subsequent encounter        No orders of the defined types were placed in this encounter. Duncan Andrea understands his proposed treatment plan and diagnoses. It is recommended that he continue with supervised physical therapy. He is not yet ready to return to work with the rigorous demands as a . So, we will comply with that. Plan:    1) Continue PT as directed. 2) Work Note: Mr. Hilton Dyson has sustained a severe injury to his ankle and his knee. He is not yet ready to return to duty, due to the physical requirements of being a . He is instructed to continue physical therapy at this time, as he is making good progress. Please keep him on light duty work for another 6 weeks, until Monday, September 30th. 3) Continue activity modification as directed. RTO - 4 weeks     HPI AND EXAMINATION     Duncan Andrea IS A 30 y.o. male who presents to my outpatient office for follow up of sprain of the ATFL of the left ankle and a sprain of the collateral ligament of the left knee. At the last visit, I provided a work note, instructed the patient to continue activity modification as directed, and to continue PT. Date of injury: 10/23/2018. Since we saw him last, Mr. Hilton Dyson states that he is beginning to experience tenderness to palpation in his lateral left ankle again. He notes that he has been going to PT, but that he has missed some sessions, as his wife gave birth 1 month ago.  He reports that his physical therapists do not think he is ready to return to work. He is accompanied to the office today by his wife and his 2 month old daughter. 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 Visit Vitals  /82   Pulse 75   Temp 96.9 °F (36.1 °C) (Oral)   Ht 5' 9\" (1.753 m)   Wt 252 lb (114.3 kg)   SpO2 97%   BMI 37.21 kg/m²      Appearance: Alert, well appearing and pleasant patient who is in no distress, oriented to person, place/time, and who follows commands. This patient is accompanied in the examination room by his wife and 2 month old daughter. Dementia: no dementia  Psychiatric: Affect and mood are appropriate. Patient arrives to office via: without assistive device  HEENT: Head normocephalic & atraumatic. Both pupils are round, non icteric sclera              Eye: EOM are intact and sclera are clear               Neck: ROM WNL and JVD neck is not present              Hearings Intact, does not require hearing aid device  Respiratory: Breathing is unlabored without accessory chest muscle use  Cardiovascular/Peripheral Vascular: Normal Pulses to each foot     ANKLE/FOOT left     Gait: Normal  Tenderness: No tenderness posterolaterally with external rotation/stress test at this time. No tenderness to the lateral calcaneal wall at this time. Cutaneous: No swelling to the anterolateral ankle in comparison to right ankle at this time. No effusion, no redness  Joint Motion: Full PF, inversion, and eversion                          Does not fully DF    Tenderness with inversion. Joint / Tendon Stability: No ankle or subtalar instability noted, good end point, no gross instability           Negative Pucker's sign                                            No peroneal sublux ability or dislocation  Alignment: Forefoot, Midfoot, Hindfoot WNL. Neuro Motor/Sensory: NL/NL. Can double stance heel rise with pain in his posterolateral ankle. Vascular: NL foot/ankle pulses.   Lymphatics: No extremity lymphedema, No calf swelling, no tenderness to calf muscles.     Knees:  Left       Cutaneous: Skin intact, no abrasions, blisters, wounds, erythema       Effusion: Is not present       Crepitus:  no PF joint crepitus       Tenderness: No tenderness at this time. Alignment of Knee: neutral when standing       ROM: full range of motion, can make figure four       Fullness or swelling: None to popliteal fossa region       Stability: No instability to anterior, posterior, varus, valgus stress testing       Thrust:  No varus thrust with gait       Neuro: Extensor mechanism is intact    CHART REVIEW     Past Medical History:   Diagnosis Date    Condyloma acuminatum     Environmental allergies     Groin pain     Plantar fasciitis     Tinea pedis      Current Outpatient Medications   Medication Sig    valACYclovir (VALTREX) 500 mg tablet Take  by mouth two (2) times a day.  ergocalciferol (ERGOCALCIFEROL) 50,000 unit capsule Take 1 Cap by mouth every seven (7) days.  valACYclovir (VALTREX) 1 gram tablet Take 1 Tab by mouth daily.  montelukast (SINGULAIR) 10 mg tablet Take 1 Tab by mouth daily.  ibuprofen (MOTRIN) 600 mg tablet Take 1 Tab by mouth every six (6) hours as needed for Pain.  guaiFENesin-codeine (CHERATUSSIN AC) 100-10 mg/5 mL solution Take 10 mL by mouth four (4) times daily as needed for Cough. Max Daily Amount: 40 mL. No current facility-administered medications for this visit.       Allergies   Allergen Reactions    Penicillins Rash, Swelling and Hives    Mold Extracts Rash    Peanut Rash and Swelling     Past Surgical History:   Procedure Laterality Date    HX OTHER SURGICAL      dental     Social History     Occupational History    Not on file   Tobacco Use    Smoking status: Never Smoker    Smokeless tobacco: Never Used   Substance and Sexual Activity    Alcohol use: No    Drug use: No    Sexual activity: Not on file     Family History   Problem Relation Age of Onset    Hypertension Mother     Diabetes Mother     Hypertension Father         REVIEW OF SYSTEMS : 8/19/2019  ALL BELOW ARE Negative except : SEE HPI     Constitutional: Negative for fever, chills and weight loss. Neg Weight Loss  Cardiovascular: Negative for chest pain, claudication and leg swelling. SOB, HURD   Gastrointestinal/Urological: Negative for  pain, N/V/D/C, Blood in stool or urine,dysuria                         Hematuria, Incontinence, pelvic pain  Musculoskeletal: see HPI. Neurological: Negative for dizziness and weakness, headaches,Visual Changes             Confusion,  Or Seizures,   Psychiatric/Behavioral: Negative for depression, memory loss and substance abuse. Extremities:  Negative for hair changes, rash or skin lesion changes. Hematologic: Negative for Bleeding problems, bruising, pallor or swollen lymph nodes. Peripheral Vascular: No calf pain, vascular vein tenderness to calf pain              No calf throbbing, posterior knee throbbing pain     DIAGNOSTIC IMAGING     No notes on file      Please see above section of this report. I have personally reviewed the results of the above study. The interpretation of this study is my professional opinion. Written by [facility], as dictated by Dr. Rob Navarrete. I, Dr. Rob Navarrete, confirm that all documentation is accurate.

## 2019-08-19 NOTE — PATIENT INSTRUCTIONS
Ankle Sprain: Care Instructions  Your Care Instructions    An ankle sprain can happen when you twist your ankle. The ligaments that support the ankle can get stretched and torn. Often the ankle is swollen and painful. Ankle sprains may take from several weeks to several months to heal. Usually, the more pain and swelling you have, the more severe your ankle sprain is and the longer it will take to heal. You can heal faster and regain strength in your ankle with good home treatment. It is very important to give your ankle time to heal completely, so that you do not easily hurt your ankle again. Follow-up care is a key part of your treatment and safety. Be sure to make and go to all appointments, and call your doctor if you are having problems. It's also a good idea to know your test results and keep a list of the medicines you take. How can you care for yourself at home? · Prop up your foot on pillows as much as possible for the next 3 days. Try to keep your ankle above the level of your heart. This will help reduce the swelling. · Follow your doctor's directions for wearing a splint or elastic bandage. Wrapping the ankle may help reduce or prevent swelling. · Your doctor may give you a splint, a brace, an air stirrup, or another form of ankle support to protect your ankle until it is healed. Wear it as directed while your ankle is healing. Do not remove it unless your doctor tells you to. After your ankle has healed, ask your doctor whether you should wear the brace when you exercise. · Put ice or cold packs on your injured ankle for 10 to 20 minutes at a time. Try to do this every 1 to 2 hours for the next 3 days (when you are awake) or until the swelling goes down. Put a thin cloth between the ice and your skin. · You may need to use crutches until you can walk without pain. If you do use crutches, try to bear some weight on your injured ankle if you can do so without pain.  This helps the ankle heal.  · Take pain medicines exactly as directed. ? If the doctor gave you a prescription medicine for pain, take it as prescribed. ? If you are not taking a prescription pain medicine, ask your doctor if you can take an over-the-counter medicine. · If you have been given ankle exercises to do at home, do them exactly as instructed. These can promote healing and help prevent lasting weakness. When should you call for help? Call your doctor now or seek immediate medical care if:    · Your pain is getting worse.     · Your swelling is getting worse.     · Your splint feels too tight or you are unable to loosen it.    Watch closely for changes in your health, and be sure to contact your doctor if:    · You are not getting better after 1 week. Where can you learn more? Go to http://paulina-cale.info/. Enter T263 in the search box to learn more about \"Ankle Sprain: Care Instructions. \"  Current as of: September 20, 2018  Content Version: 12.1  © 5293-4460 [employer], Incorporated. Care instructions adapted under license by [employer] (which disclaims liability or warranty for this information). If you have questions about a medical condition or this instruction, always ask your healthcare professional. [address] any warranty or liability for your use of this information.

## 2019-08-19 NOTE — LETTER
NOTIFICATION RETURN TO WORK / SCHOOL 
 
8/19/2019 10:00 AM 
 
[facility] [id number] To Whom It May Concern: 
 
Emiliana Moraes is currently under the care of [address] [place] [address] Francisco Jean. Mr. Barb Eli has sustained a severe injury to his ankle and his knee. He is not yet ready to return to duty, due to the physical requirements of being a . He is instructed to continue physical therapy at this time, as he is making good progress. Per his physical therapist, \"Pt continues to struggle with higher level mobility including running, jumping, skipping, lateral movements, agility, and plyometrics due to left knee and ankle pain. 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 \" Please keep him on light duty work for another 6 weeks, until Monday, September 30th. If there are questions or concerns please have the patient contact our office.  
 
 
 
Sincerely, 
 
 
Lauren Dillard MD

## 2019-11-18 NOTE — PROGRESS NOTES
[facility] - Iman Olson  [address] [place] [address]  [phone] fax    Physical Therapy Discharge Summary    Patient name: Milagros White Start of Care:  19   Referral source: Herminio Cornelius MD : 1985   Medical/Treatment Diagnosis: Left ankle pain [M25.572]  Sprain of other ligament of left ankle, subsequent encounter [S93.740D]  Sprain of tibiofibular ligament of left ankle, subsequent encounter [S93.636D]  Payor: Chela Keep / Plan: [address] [place] HMO / Product Type: HMO /  Onset Date: 2018     Prior Hospitalization: see medical history Provider#: [id number]   Medications: Verified on Patient Summary List    Comorbidities: HTN  Prior Level of Function: , enjoy gardening    Visits from [place] of Care: 13    Missed Visits: 0    Reporting Period : 19 to 19    Summary of Care:  Goal: Patient will improve improve FOTO score by 50 in order to demonstrate a significant improvement in function. Status at last note/certification: 51  Status at discharge: not met    Goal: Patient will be able to perform 5-10-5 shuttle without increase in symptoms to demonstrate ability to return to work. Status at last note/certification: n/a  Status at discharge: not met    Goal: Patient will perform 20 consecutive tuck jumps without compensations in order to increase ease of working.   Status at last note/certification: unable  Status at discharge: not met    Goal: Patient will run 50 feet without increased pain in order to increase ease of working.   Status at last note/certification: unable  Status at discharge: not met    Pt. Did not return to PT after last progress note, so will be D/C at this time. Per last progress note \"Pt has progressed with therapy and is advancing therapeutic exercises. He demonstrated increased ease of jogging short distances and with lunges today.  Pt continues to struggle with higher level mobility including running, jumping, skipping, lateral movements, agility, and plyometrics due to left knee and ankle pain. 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 He has genu valgus with squatting and single leg activities. Pt is also deconditioned despite therapist emphasis on walking program and participation in HEP to accelerate return to PLOF. Pt HEP has been updated and additions have been made emphasizing proximal LE strengthening to correct knee position during movements. Pt has had improvement in symptoms at rest and with low level/impact activity. \"       ASSESSMENT/RECOMMENDATIONS:  [x]Discontinue therapy: []Patient has reached or is progressing toward set goals      [x]Patient is non-compliant or has abdicated      []Due to lack of appreciable progress towards set goals    Nati Johnson, PT 11/18/2019 2:52 PM

## 2019-12-23 ENCOUNTER — OFFICE VISIT (OUTPATIENT)
Dept: FAMILY MEDICINE CLINIC | Age: 34
End: 2019-12-23

## 2019-12-23 VITALS
OXYGEN SATURATION: 98 % | HEART RATE: 74 BPM | SYSTOLIC BLOOD PRESSURE: 136 MMHG | DIASTOLIC BLOOD PRESSURE: 88 MMHG | BODY MASS INDEX: 35.37 KG/M2 | WEIGHT: 238.8 LBS | TEMPERATURE: 98.3 F | HEIGHT: 69 IN | RESPIRATION RATE: 20 BRPM

## 2019-12-23 DIAGNOSIS — L65.9 ALOPECIA: ICD-10-CM

## 2019-12-23 DIAGNOSIS — E55.9 VITAMIN D DEFICIENCY: ICD-10-CM

## 2019-12-23 DIAGNOSIS — F90.0 ATTENTION DEFICIT HYPERACTIVITY DISORDER (ADHD), PREDOMINANTLY INATTENTIVE TYPE: Primary | ICD-10-CM

## 2019-12-23 RX ORDER — VALACYCLOVIR HYDROCHLORIDE 1 G/1
1000 TABLET, FILM COATED ORAL DAILY
Qty: 60 TAB | Refills: 5 | Status: SHIPPED | OUTPATIENT
Start: 2019-12-23 | End: 2021-01-25

## 2019-12-23 RX ORDER — ERGOCALCIFEROL 1.25 MG/1
50000 CAPSULE ORAL
Qty: 12 CAP | Refills: 3 | Status: SHIPPED | OUTPATIENT
Start: 2019-12-23 | End: 2022-07-12

## 2019-12-23 RX ORDER — MONTELUKAST SODIUM 10 MG/1
10 TABLET ORAL DAILY
Qty: 30 TAB | Refills: 5 | Status: SHIPPED | OUTPATIENT
Start: 2019-12-23 | End: 2020-04-09 | Stop reason: SDUPTHER

## 2019-12-23 RX ORDER — DEXTROAMPHETAMINE SACCHARATE, AMPHETAMINE ASPARTATE, DEXTROAMPHETAMINE SULFATE AND AMPHETAMINE SULFATE 5; 5; 5; 5 MG/1; MG/1; MG/1; MG/1
20 TABLET ORAL 2 TIMES DAILY
Qty: 60 TAB | Refills: 0 | Status: SHIPPED | OUTPATIENT
Start: 2019-12-23 | End: 2020-01-21

## 2019-12-23 NOTE — PROGRESS NOTES
Patient is in the office today for a follow up. 1. Have you been to the ER, urgent care clinic since your last visit? Hospitalized since your last visit? No    2. Have you seen or consulted any other health care providers outside of the [address] [place] [address] since your last visit? Include any pap smears or colon screening.  No

## 2019-12-23 NOTE — PATIENT INSTRUCTIONS
High Blood Pressure: Care Instructions Overview It's normal for blood pressure to go up and down throughout the day. But if it stays up, you have high blood pressure. Another name for high blood pressure is hypertension. Despite what a lot of people think, high blood pressure usually doesn't cause headaches or make you feel dizzy or lightheaded. It usually has no symptoms. But it does increase your risk of stroke, heart attack, and other problems. You and your doctor will talk about your risks of these problems based on your blood pressure. Your doctor will give you a goal for your blood pressure. Your goal will be based on your health and your age. Lifestyle changes, such as eating healthy and being active, are always important to help lower blood pressure. You might also take medicine to reach your blood pressure goal. 
Follow-up care is a key part of your treatment and safety. Be sure to make and go to all appointments, and call your doctor if you are having problems. It's also a good idea to know your test results and keep a list of the medicines you take. How can you care for yourself at home? Medical treatment · If you stop taking your medicine, your blood pressure will go back up. You may take one or more types of medicine to lower your blood pressure. Be safe with medicines. Take your medicine exactly as prescribed. Call your doctor if you think you are having a problem with your medicine. · Talk to your doctor before you start taking aspirin every day. Aspirin can help certain people lower their risk of a heart attack or stroke. But taking aspirin isn't right for everyone, because it can cause serious bleeding. · See your doctor regularly. You may need to see the doctor more often at first or until your blood pressure comes down. · If you are taking blood pressure medicine, talk to your doctor before you take decongestants or anti-inflammatory medicine, such as ibuprofen. Some of these medicines can raise blood pressure. · Learn how to check your blood pressure at home. Lifestyle changes · Stay at a healthy weight. This is especially important if you put on weight around the waist. Losing even 10 pounds can help you lower your blood pressure. · If your doctor recommends it, get more exercise. Walking is a good choice. Bit by bit, increase the amount you walk every day. Try for at least 30 minutes on most days of the week. You also may want to swim, bike, or do other activities. · Avoid or limit alcohol. Talk to your doctor about whether you can drink any alcohol. · Try to limit how much sodium you eat to less than 2,300 milligrams (mg) a day. Your doctor may ask you to try to eat less than 1,500 mg a day. · Eat plenty of fruits (such as bananas and oranges), vegetables, legumes, whole grains, and low-fat dairy products. · Lower the amount of saturated fat in your diet. Saturated fat is found in animal products such as milk, cheese, and meat. Limiting these foods may help you lose weight and also lower your risk for heart disease. · Do not smoke. Smoking increases your risk for heart attack and stroke. If you need help quitting, talk to your doctor about stop-smoking programs and medicines. These can increase your chances of quitting for good. When should you call for help? Call  911 anytime you think you may need emergency care. This may mean having symptoms that suggest that your blood pressure is causing a serious heart or blood vessel problem. Your blood pressure may be over 180/120. 
 For example, call  911 if: 
  · You have symptoms of a heart attack. These may include: 
? Chest pain or pressure, or a strange feeling in the chest. 
? Sweating. ? Shortness of breath. ? Nausea or vomiting. ? Pain, pressure, or a strange feeling in the back, neck, jaw, or upper belly or in one or both shoulders or arms. ? Lightheadedness or sudden weakness. ? A fast or irregular heartbeat.  
  · You have symptoms of a stroke. These may include: 
? Sudden numbness, tingling, weakness, or loss of movement in your face, arm, or leg, especially on only one side of your body. ? Sudden vision changes. ? Sudden trouble speaking. ? Sudden confusion or trouble understanding simple statements. ? Sudden problems with walking or balance. ? A sudden, severe headache that is different from past headaches.  
  · You have severe back or belly pain.  
 Do not wait until your blood pressure comes down on its own. Get help right away. 
 Call your doctor now or seek immediate care if: 
  · Your blood pressure is much higher than normal (such as 180/120 or higher), but you don't have symptoms.  
  · You think high blood pressure is causing symptoms, such as: 
? Severe headache. 
? Blurry vision.  
 Watch closely for changes in your health, and be sure to contact your doctor if: 
  · Your blood pressure measures higher than your doctor recommends at least 2 times. That means the top number is higher or the bottom number is higher, or both.  
  · You think you may be having side effects from your blood pressure medicine. Where can you learn more? Go to http://paulina-cale.info/. Enter K684 in the search box to learn more about \"High Blood Pressure: Care Instructions. \" Current as of: April 9, 2019 Content Version: 12.2 © 6147-5051 [employer]. Care instructions adapted under license by "[employer]" (which disclaims liability or warranty for this information). If you have questions about a medical condition or this instruction, always ask your healthcare professional. [place] [address] any warranty or liability for your use of this information.

## 2019-12-29 NOTE — PROGRESS NOTES
Deion Smith is a 30 y.o.  male and presents with Hypertension; Allergic Rhinitis; Attention Deficit Disorder; and Vitamin D Deficiency      SUBJECTIVE:    Patient has had difficulty focusing since he was in the second grade. At that time he was diagnosed with ADD and treated with medication including Ritalin but he did not take it as a child or adolescent on a regular basis. Patient now in his job continues to have problems with organizing, staying on task and finishing tasks in a timely fashion. He did ADD questionnaire which was significant for ADD. Patient is willing to try medications again. Patient has a history of low vitamin D and he will go ahead and restart taking vitamin D 50,000 units/week. 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 Patient has chronic allergies for which he already uses nasal steroid and antihistamine. He will go ahead and add Singulair back to his regimen to get his allergies under better control which include postnasal drainage occasional cough and nasal congestion. Respiratory ROS: negative for - shortness of breath  Cardiovascular ROS: negative for - chest pain    Current Outpatient Medications   Medication Sig    ergocalciferol (ERGOCALCIFEROL) 50,000 unit capsule Take 1 Cap by mouth every seven (7) days.  valACYclovir (VALTREX) 1 gram tablet Take 1 Tab by mouth daily.  montelukast (SINGULAIR) 10 mg tablet Take 1 Tab by mouth daily.  dextroamphetamine-amphetamine (ADDERALL) 20 mg tablet Take 1 Tab by mouth two (2) times a day. Max Daily Amount: 40 mg.    ibuprofen (MOTRIN) 600 mg tablet Take 1 Tab by mouth every six (6) hours as needed for Pain.  valACYclovir (VALTREX) 500 mg tablet Take  by mouth two (2) times a day.  montelukast (SINGULAIR) 10 mg tablet Take 1 Tab by mouth daily. No current facility-administered medications for this visit.           OBJECTIVE:  alert, well appearing, and in no distress  Visit Vitals  /88 (BP 1 Location: Left arm, BP Patient Position: Sitting)   Pulse 74   Temp 98.3 °F (36.8 °C) (Oral)   Resp 20   Ht 5' 9\" (1.753 m)   Wt 238 lb 12.8 oz (108.3 kg)   SpO2 98%   BMI 35.26 kg/m²      well developed and well nourished  Chest - clear to auscultation, no wheezes, rales or rhonchi, symmetric air entry  Heart - normal rate, regular rhythm, normal S1, S2, no murmurs, rubs, clicks or gallops  Skin - HAIR: Mild alopecia consistent with male pattern baldness        Discussed the patient's BMI with him. The BMI follow up plan is as follows: I have counseled this patient on diet and exercise regimens. Assessment/Plan      ICD-10-CM ICD-9-CM    1. Attention deficit hyperactivity disorder (ADHD), predominantly inattentive type F90.0 314.00 Will treat with dextroamphetamine-amphetamine (ADDERALL) 20 mg tablet BID    2. Vitamin D deficiency E55.9 268.9 Pt to restart ergocalciferol (ERGOCALCIFEROL) 50,000 unit capsule   3. Alopecia L65.9 704.00 REFERRAL TO DERMATOLOGY     Follow-up and Dispositions    · Return in about 4 weeks (around 1/20/2020) for ADD. Reviewed plan of care. Patient has provided input and agrees with goals.

## 2020-01-21 ENCOUNTER — OFFICE VISIT (OUTPATIENT)
Dept: FAMILY MEDICINE CLINIC | Age: 35
End: 2020-01-21

## 2020-01-21 VITALS
TEMPERATURE: 98.5 F | HEART RATE: 80 BPM | HEIGHT: 69 IN | DIASTOLIC BLOOD PRESSURE: 89 MMHG | SYSTOLIC BLOOD PRESSURE: 152 MMHG | BODY MASS INDEX: 32.88 KG/M2 | WEIGHT: 222 LBS | RESPIRATION RATE: 20 BRPM | OXYGEN SATURATION: 97 %

## 2020-01-21 DIAGNOSIS — F41.9 ANXIETY: ICD-10-CM

## 2020-01-21 DIAGNOSIS — E55.9 VITAMIN D DEFICIENCY: ICD-10-CM

## 2020-01-21 DIAGNOSIS — J30.1 SEASONAL ALLERGIC RHINITIS DUE TO POLLEN: ICD-10-CM

## 2020-01-21 DIAGNOSIS — F90.0 ATTENTION DEFICIT HYPERACTIVITY DISORDER (ADHD), PREDOMINANTLY INATTENTIVE TYPE: Primary | ICD-10-CM

## 2020-01-21 RX ORDER — DEXTROAMPHETAMINE SACCHARATE, AMPHETAMINE ASPARTATE, DEXTROAMPHETAMINE SULFATE AND AMPHETAMINE SULFATE 7.5; 7.5; 7.5; 7.5 MG/1; MG/1; MG/1; MG/1
30 TABLET ORAL 2 TIMES DAILY
Qty: 60 TAB | Refills: 0 | Status: SHIPPED | OUTPATIENT
Start: 2020-01-21 | End: 2020-03-24 | Stop reason: SDUPTHER

## 2020-01-21 RX ORDER — EPINEPHRINE 0.3 MG/.3ML
0.3 INJECTION SUBCUTANEOUS
Qty: 2 SYRINGE | Refills: 1 | Status: SHIPPED | OUTPATIENT
Start: 2020-01-21 | End: 2020-01-21

## 2020-01-21 NOTE — PROGRESS NOTES
Patient is in the office today for 1 month follow up. 1. Have you been to the ER, urgent care clinic since your last visit? Hospitalized since your last visit? No    2. Have you seen or consulted any other health care providers outside of the [address] [place] [address] since your last visit? Include any pap smears or colon screening.  No

## 2020-01-21 NOTE — PATIENT INSTRUCTIONS

## 2020-01-25 NOTE — PROGRESS NOTES
Ivanna Kelly is a 30 y.o.  male and presents with Attention Deficit Disorder; Anxiety; and Allergic Rhinitis      SUBJECTIVE:    Pt is able to focus better on adderall 20 mg BID but it does not last his whole work day. Pt continues to be under a lot of stress and would benefit from seeing a therapist. He continues on vit D 50,000 units/week for his vit D deficiency. Patient continues to have significant allergies including nasal congestion postnasal drip even on Singulair and Flonase. At this time will refer to ENT for further evaluation. Respiratory ROS: negative for - shortness of breath  Cardiovascular ROS: negative for - chest pain    Current Outpatient Medications   Medication Sig    dextroamphetamine-amphetamine (ADDERALL) 30 mg tablet Take 1 Tab by mouth two (2) times a day. Max Daily Amount: 2 Tabs.  ergocalciferol (ERGOCALCIFEROL) 50,000 unit capsule Take 1 Cap by mouth every seven (7) days.  valACYclovir (VALTREX) 1 gram tablet Take 1 Tab by mouth daily.  montelukast (SINGULAIR) 10 mg tablet Take 1 Tab by mouth daily.  valACYclovir (VALTREX) 500 mg tablet Take  by mouth two (2) times a day. No current facility-administered medications for this visit. OBJECTIVE:  alert, well appearing, and in no distress  Visit Vitals  /89 (BP 1 Location: Left arm, BP Patient Position: Sitting)   Pulse 80   Temp 98.5 °F (36.9 °C) (Oral)   Resp 20   Ht 5' 9\" (1.753 m)   Wt 222 lb (100.7 kg)   SpO2 97%   BMI 32.78 kg/m²      well developed and well nourished  Chest - clear to auscultation, no wheezes, rales or rhonchi, symmetric air entry  Heart - normal rate, regular rhythm, normal S1, S2, no murmurs, rubs, clicks or gallops       Discussed the patient's BMI with him. The BMI follow up plan is as follows: I have counseled this patient on diet and exercise regimens. Assessment/Plan      ICD-10-CM ICD-9-CM    1.  Attention deficit hyperactivity disorder (ADHD), predominantly inattentive type F90.0 314.00 Improving but not optimal. Will increase to dextroamphetamine-amphetamine (ADDERALL) 30 mg tablet BID    2. Vitamin D deficiency E55.9 268.9 Status unknown. Pt continue on vit D 50,000 units/week    3. Seasonal allergic rhinitis due to pollen J30.1 477.0 Still not well controlled. Continue on Singulair and Flonase. REFERRAL TO ENT-OTOLARYNGOLOGY     4. Anxiety F41.9 300.00 Uncontrolled. Pt given list of therapists                Follow-up and Dispositions    · Return in about 3 months (around 4/21/2020) for ADD. Reviewed plan of care. Patient has provided input and agrees with goals.

## 2020-02-10 ENCOUNTER — APPOINTMENT (OUTPATIENT)
Dept: GENERAL RADIOLOGY | Age: 35
End: 2020-02-10
Attending: PHYSICIAN ASSISTANT
Payer: COMMERCIAL

## 2020-02-10 ENCOUNTER — HOSPITAL ENCOUNTER (EMERGENCY)
Age: 35
Discharge: HOME | End: 2020-02-10
Attending: EMERGENCY MEDICINE
Payer: COMMERCIAL

## 2020-02-10 VITALS
HEART RATE: 98 BPM | OXYGEN SATURATION: 98 % | TEMPERATURE: 99 F | DIASTOLIC BLOOD PRESSURE: 92 MMHG | HEIGHT: 69 IN | SYSTOLIC BLOOD PRESSURE: 132 MMHG | BODY MASS INDEX: 29.62 KG/M2 | RESPIRATION RATE: 18 BRPM | WEIGHT: 200 LBS

## 2020-02-10 DIAGNOSIS — J06.9 UPPER RESPIRATORY TRACT INFECTION, UNSPECIFIED TYPE: Primary | ICD-10-CM

## 2020-02-10 DIAGNOSIS — J34.89 RHINORRHEA: ICD-10-CM

## 2020-02-10 LAB
ALBUMIN SERPL-MCNC: 4.4 G/DL (ref 3.4–5)
ALBUMIN/GLOB SERPL: 1.2 {RATIO} (ref 0.8–1.7)
ALP SERPL-CCNC: 88 U/L (ref 45–117)
ALT SERPL-CCNC: 48 U/L (ref 16–61)
ANION GAP SERPL CALC-SCNC: 6 MMOL/L (ref 3–18)
AST SERPL-CCNC: 22 U/L (ref 10–38)
BASOPHILS # BLD: 0 K/UL (ref 0–0.1)
BASOPHILS NFR BLD: 1 % (ref 0–2)
BILIRUB SERPL-MCNC: 0.5 MG/DL (ref 0.2–1)
BUN SERPL-MCNC: 9 MG/DL (ref 7–18)
BUN/CREAT SERPL: 7 (ref 12–20)
CALCIUM SERPL-MCNC: 9.5 MG/DL (ref 8.5–10.1)
CHLORIDE SERPL-SCNC: 105 MMOL/L (ref 100–111)
CO2 SERPL-SCNC: 31 MMOL/L (ref 21–32)
CREAT SERPL-MCNC: 1.25 MG/DL (ref 0.6–1.3)
DIFFERENTIAL METHOD BLD: ABNORMAL
EOSINOPHIL # BLD: 0 K/UL (ref 0–0.4)
EOSINOPHIL NFR BLD: 1 % (ref 0–5)
ERYTHROCYTE [DISTWIDTH] IN BLOOD BY AUTOMATED COUNT: 13.3 % (ref 11.6–14.5)
GLOBULIN SER CALC-MCNC: 3.8 G/DL (ref 2–4)
GLUCOSE SERPL-MCNC: 97 MG/DL (ref 74–99)
HCT VFR BLD AUTO: 42 % (ref 36–48)
HGB BLD-MCNC: 14.1 G/DL (ref 13–16)
LYMPHOCYTES # BLD: 2 K/UL (ref 0.9–3.6)
LYMPHOCYTES NFR BLD: 44 % (ref 21–52)
MCH RBC QN AUTO: 30.7 PG (ref 24–34)
MCHC RBC AUTO-ENTMCNC: 33.6 G/DL (ref 31–37)
MCV RBC AUTO: 91.5 FL (ref 74–97)
MONOCYTES # BLD: 0.4 K/UL (ref 0.05–1.2)
MONOCYTES NFR BLD: 9 % (ref 3–10)
NEUTS SEG # BLD: 2.1 K/UL (ref 1.8–8)
NEUTS SEG NFR BLD: 45 % (ref 40–73)
PLATELET # BLD AUTO: 262 K/UL (ref 135–420)
PMV BLD AUTO: 9 FL (ref 9.2–11.8)
POTASSIUM SERPL-SCNC: 4.3 MMOL/L (ref 3.5–5.5)
PROT SERPL-MCNC: 8.2 G/DL (ref 6.4–8.2)
RBC # BLD AUTO: 4.59 M/UL (ref 4.7–5.5)
SODIUM SERPL-SCNC: 142 MMOL/L (ref 136–145)
TROPONIN I SERPL-MCNC: <0.02 NG/ML (ref 0–0.04)
WBC # BLD AUTO: 4.5 K/UL (ref 4.6–13.2)

## 2020-02-10 PROCEDURE — 93005 ELECTROCARDIOGRAM TRACING: CPT

## 2020-02-10 PROCEDURE — 71045 X-RAY EXAM CHEST 1 VIEW: CPT

## 2020-02-10 PROCEDURE — 84484 ASSAY OF TROPONIN QUANT: CPT

## 2020-02-10 PROCEDURE — 85025 COMPLETE CBC W/AUTO DIFF WBC: CPT

## 2020-02-10 PROCEDURE — 80053 COMPREHEN METABOLIC PANEL: CPT

## 2020-02-10 PROCEDURE — 99283 EMERGENCY DEPT VISIT LOW MDM: CPT

## 2020-02-10 RX ORDER — CETIRIZINE HCL 10 MG
10 TABLET ORAL DAILY
Qty: 30 TAB | Refills: 0 | Status: SHIPPED | OUTPATIENT
Start: 2020-02-10 | End: 2021-01-25

## 2020-02-10 RX ORDER — BENZONATATE 200 MG/1
200 CAPSULE ORAL
COMMUNITY
End: 2021-01-25

## 2020-02-10 NOTE — DISCHARGE INSTRUCTIONS
Patient Education        Saline Nasal Washes: Care Instructions  Your Care Instructions  Saline nasal washes help keep the nasal passages open by washing out thick or dried mucus. This simple remedy can help relieve symptoms of allergies, sinusitis, and colds. It also can make the nose feel more comfortable by keeping the mucous membranes moist. You may notice a little burning sensation in your nose the first few times you use the solution, but this usually gets better in a few days. Follow-up care is a key part of your treatment and safety. Be sure to make and go to all appointments, and call your doctor if you are having problems. It's also a good idea to know your test results and keep a list of the medicines you take. How can you care for yourself at home? · You can buy premixed saline solution in a squeeze bottle or other sinus rinse products at a drugstore. Read and follow the instructions on the label. · You also can make your own saline solution by adding 1 teaspoon of salt and 1 teaspoon of baking soda to 2 cups of distilled water. · If you use a homemade solution, pour a small amount into a clean bowl. Using a rubber bulb syringe, squeeze the syringe and place the tip in the salt water. Pull a small amount of the salt water into the syringe by relaxing your hand. · Sit down with your head tilted slightly back. Do not lie down. Put the tip of the bulb syringe or the squeeze bottle a little way into one of your nostrils. Gently drip or squirt a few drops into the nostril. Repeat with the other nostril. Some sneezing and gagging are normal at first.  · Gently blow your nose. · Wipe the syringe or bottle tip clean after each use. · Repeat this 2 or 3 times a day. · Use nasal washes gently if you have nosebleeds often. When should you call for help?   Watch closely for changes in your health, and be sure to contact your doctor if:    · You often get nosebleeds.     · You have problems doing the nasal washes. Where can you learn more? Go to http://paulina-cale.info/. Enter [id number] in the search box to learn more about \"Saline Nasal Washes: Care Instructions. \"  Current as of: October 21, 2018  Content Version: 12.2  © 3838-4736 [employer]. Care instructions adapted under license by [employer] (which disclaims liability or warranty for this information). If you have questions about a medical condition or this instruction, always ask your healthcare professional. [place] [address] any warranty or liability for your use of this information. Patient Education        Upper Respiratory Infection (Cold): Care Instructions  Your Care Instructions    An upper respiratory infection, or URI, is an infection of the nose, sinuses, or throat. URIs are spread by coughs, sneezes, and direct contact. The common cold is the most frequent kind of URI. The flu and sinus infections are other kinds of URIs. Almost all URIs are caused by viruses. Antibiotics won't cure them. But you can treat most infections with home care. This may include drinking lots of fluids and taking over-the-counter pain medicine. You will probably feel better in 4 to 10 days. The doctor has checked you carefully, but problems can develop later. If you notice any problems or new symptoms, get medical treatment right away. Follow-up care is a key part of your treatment and safety. Be sure to make and go to all appointments, and call your doctor if you are having problems. It's also a good idea to know your test results and keep a list of the medicines you take. How can you care for yourself at home? · To prevent dehydration, drink plenty of fluids, enough so that your urine is light yellow or clear like water. Choose water and other caffeine-free clear liquids until you feel better.  If you have kidney, heart, or liver disease and have to limit fluids, talk with your doctor before you increase the amount of fluids you drink. · Take an over-the-counter pain medicine, such as acetaminophen (Tylenol), ibuprofen (Advil, Motrin), or naproxen (Aleve). Read and follow all instructions on the label. · Before you use cough and cold medicines, check the label. These medicines may not be safe for young children or for people with certain health problems. · Be careful when taking over-the-counter cold or flu medicines and Tylenol at the same time. Many of these medicines have acetaminophen, which is Tylenol. Read the labels to make sure that you are not taking more than the recommended dose. Too much acetaminophen (Tylenol) can be harmful. · Get plenty of rest.  · Do not smoke or allow others to smoke around you. If you need help quitting, talk to your doctor about stop-smoking programs and medicines. These can increase your chances of quitting for good. When should you call for help? Call 911 anytime you think you may need emergency care. For example, call if:    · You have severe trouble breathing.    Call your doctor now or seek immediate medical care if:    · You seem to be getting much sicker.     · You have new or worse trouble breathing.     · You have a new or higher fever.     · You have a new rash.    Watch closely for changes in your health, and be sure to contact your doctor if:    · You have a new symptom, such as a sore throat, an earache, or sinus pain.     · You cough more deeply or more often, especially if you notice more mucus or a change in the color of your mucus.     · You do not get better as expected. Where can you learn more? Go to http://paulina-cale.info/. Enter A167 in the search box to learn more about \"Upper Respiratory Infection (Cold): Care Instructions. \"  Current as of: June 9, 2019  Content Version: 12.2  © 2374-1885 [employer].  Care instructions adapted under license by [employer] (which disclaims liability or warranty for this information). If you have questions about a medical condition or this instruction, always ask your healthcare professional. [address] any warranty or liability for your use of this information. Patient Education        Sinusitis: Care Instructions  Your Care Instructions    Sinusitis is an infection of the lining of the sinus cavities in your head. Sinusitis often follows a cold. It causes pain and pressure in your head and face. In most cases, sinusitis gets better on its own in 1 to 2 weeks. But some mild symptoms may last for several weeks. Sometimes antibiotics are needed. Follow-up care is a key part of your treatment and safety. Be sure to make and go to all appointments, and call your doctor if you are having problems. It's also a good idea to know your test results and keep a list of the medicines you take. How can you care for yourself at home? · Take an over-the-counter pain medicine, such as acetaminophen (Tylenol), ibuprofen (Advil, Motrin), or naproxen (Aleve). Read and follow all instructions on the label. · If the doctor prescribed antibiotics, take them as directed. Do not stop taking them just because you feel better. You need to take the full course of antibiotics. · Be careful when taking over-the-counter cold or flu medicines and Tylenol at the same time. Many of these medicines have acetaminophen, which is Tylenol. Read the labels to make sure that you are not taking more than the recommended dose. Too much acetaminophen (Tylenol) can be harmful. · Breathe warm, moist air from a steamy shower, a hot bath, or a sink filled with hot water. Avoid cold, dry air. Using a humidifier in your home may help. Follow the directions for cleaning the machine. · Use saline (saltwater) nasal washes to help keep your nasal passages open and wash out mucus and bacteria. You can buy saline nose drops at a grocery store or drugstore.  Or you can make your own at home by adding 1 teaspoon of salt and 1 teaspoon of baking soda to 2 cups of distilled water. If you make your own, fill a bulb syringe with the solution, insert the tip into your nostril, and squeeze gently. Nyoka Mano your nose. · Put a hot, wet towel or a warm gel pack on your face 3 or 4 times a day for 5 to 10 minutes each time. · Try a decongestant nasal spray like oxymetazoline (Afrin). Do not use it for more than 3 days in a row. Using it for more than 3 days can make your congestion worse. When should you call for help? Call your doctor now or seek immediate medical care if:    · You have new or worse swelling or redness in your face or around your eyes.     · You have a new or higher fever.    Watch closely for changes in your health, and be sure to contact your doctor if:    · You have new or worse facial pain.     · The mucus from your nose becomes thicker (like pus) or has new blood in it.     · You are not getting better as expected. Where can you learn more? Go to http://paulina-cale.info/. Enter A457 in the search box to learn more about \"Sinusitis: Care Instructions. \"  Current as of: October 21, 2018  Content Version: 12.2  © 2636-1732 [employer]. Care instructions adapted under license by [employer] (which disclaims liability or warranty for this information). If you have questions about a medical condition or this instruction, always ask your healthcare professional. [place] [address] any warranty or liability for your use of this information.

## 2020-02-10 NOTE — ED PROVIDER NOTES
EMERGENCY DEPARTMENT HISTORY AND PHYSICAL EXAM    Date: 2/10/2020  Patient Name: Etienne Hall    History of Presenting Illness     Chief Complaint   Patient presents with    Nasal Congestion    Cough    Sinus Pain    Chest Pain         History Provided By: Patient        Additional History (Context): Etienne Hall is a 30 y.o. male with no significant past medical history presenting to the emergency department with sinus congestion, runny nose, cough, slight headache for the past 3 months. Patient has a history of sleep apnea, uses CPAP machine that he is not cleaned the past 3 months. Unsure if this is what is making him sick. Patient denies chest discomfort at this time but states earlier today he got nervous and felt like he was having heart palpitations. No racing heartbeat or abnormal heartbeat noted at this time. Patient denies shortness of breath, fever, chills, numbness, back pain, nausea vomiting or diarrhea. No urinary symptoms or any other complaints at this time. Patient does admit to smoking occasionally, also EtOH occasionally. No drugs. Denies recent travel, immobilization, hx of cancer, bleeding disorders, smoking, OCP use. PCP: Sterling Pineda MD    Current Outpatient Medications   Medication Sig Dispense Refill    benzonatate (TESSALON) 200 mg capsule Take 200 mg by mouth three (3) times daily as needed for Cough.  OTHER [facility] chest congestion relief tab 400 mg.      dextroamphetamine-amphetamine (ADDERALL) 30 mg tablet Take 1 Tab by mouth two (2) times a day. Max Daily Amount: 2 Tabs. 60 Tab 0    ergocalciferol (ERGOCALCIFEROL) 50,000 unit capsule Take 1 Cap by mouth every seven (7) days. 12 Cap 3    valACYclovir (VALTREX) 1 gram tablet Take 1 Tab by mouth daily. 60 Tab 5    montelukast (SINGULAIR) 10 mg tablet Take 1 Tab by mouth daily.  30 Tab 5       Past History     Past Medical History:  Past Medical History:   Diagnosis Date    Condyloma acuminatum     Environmental allergies     Groin pain     Obstructive sleep apnea on CPAP     CATARINA (obstructive sleep apnea)     Plantar fasciitis     Sinusitis     Tinea pedis        Past Surgical History:  Past Surgical History:   Procedure Laterality Date    HX OTHER SURGICAL      dental       Family History:  Family History   Problem Relation Age of Onset    Hypertension Mother     Diabetes Mother     Hypertension Father        Social History:  Social History     Tobacco Use    Smoking status: Never Smoker    Smokeless tobacco: Never Used   Substance Use Topics    Alcohol use: No    Drug use: No       Allergies: Allergies   Allergen Reactions    Penicillins Rash, Swelling and Hives    Mold Extracts Rash    Peanut Rash and Swelling         Review of Systems     Review of Systems   Constitutional: Negative for chills and fever. HENT: positive for nasal congestion, sore throat, rhinorrhea  Eyes: Negative. Respiratory: pos cough and negative for shortness of breath. Cardiovascular: Negative for chest pain and palpitations. Gastrointestinal: Negative for abdominal pain, constipation, diarrhea, nausea and vomiting. Genitourinary: Negative. Negative for difficulty urinating and flank pain. Musculoskeletal: Negative for back pain. Negative for gait problem and neck pain. Skin: Negative. Allergic/Immunologic: Negative. Neurological: Negative for dizziness, weakness, numbness and headaches. Psychiatric/Behavioral: Negative. All other systems reviewed and are negative.   All Other Systems Negative  Physical Exam     Vitals:    02/10/20 1708   BP: (!) 132/92   Pulse: 98   Resp: 18   Temp: 99 °F (37.2 °C)   SpO2: 98%   Weight: 90.7 kg (200 lb)   Height: 5' 9\" (1.753 m)     Physical Exam      Diagnostic Study Results     Labs -     Recent Results (from the past 12 hour(s))   EKG, 12 LEAD, INITIAL    Collection Time: 02/10/20  5:17 PM   Result Value Ref Range    Ventricular Rate 94 BPM    Atrial Rate 94 BPM    P-R Interval 156 ms    QRS Duration 110 ms    Q-T Interval 354 ms    QTC Calculation (Bezet) 442 ms    Calculated P Axis 70 degrees    Calculated R Axis 80 degrees    Calculated T Axis 51 degrees    Diagnosis       Normal sinus rhythm  Normal ECG  No previous ECGs available     TROPONIN I    Collection Time: 02/10/20  5:28 PM   Result Value Ref Range    Troponin-I, QT <0.02 0.0 - 0.045 NG/ML   CBC WITH AUTOMATED DIFF    Collection Time: 02/10/20  5:28 PM   Result Value Ref Range    WBC 4.5 (L) 4.6 - 13.2 K/uL    RBC 4.59 (L) 4.70 - 5.50 M/uL    HGB 14.1 13.0 - 16.0 g/dL    HCT 42.0 36.0 - 48.0 %    MCV 91.5 74.0 - 97.0 FL    MCH 30.7 24.0 - 34.0 PG    MCHC 33.6 31.0 - 37.0 g/dL    RDW 13.3 11.6 - 14.5 %    PLATELET 946 585 - 314 K/uL    MPV 9.0 (L) 9.2 - 11.8 FL    NEUTROPHILS 45 40 - 73 %    LYMPHOCYTES 44 21 - 52 %    MONOCYTES 9 3 - 10 %    EOSINOPHILS 1 0 - 5 %    BASOPHILS 1 0 - 2 %    ABS. NEUTROPHILS 2.1 1.8 - 8.0 K/UL    ABS. LYMPHOCYTES 2.0 0.9 - 3.6 K/UL    ABS. MONOCYTES 0.4 0.05 - 1.2 K/UL    ABS. EOSINOPHILS 0.0 0.0 - 0.4 K/UL    ABS. BASOPHILS 0.0 0.0 - 0.1 K/UL    DF AUTOMATED     METABOLIC PANEL, COMPREHENSIVE    Collection Time: 02/10/20  5:28 PM   Result Value Ref Range    Sodium 142 136 - 145 mmol/L    Potassium 4.3 3.5 - 5.5 mmol/L    Chloride 105 100 - 111 mmol/L    CO2 31 21 - 32 mmol/L    Anion gap 6 3.0 - 18 mmol/L    Glucose 97 74 - 99 mg/dL    BUN 9 7.0 - 18 MG/DL    Creatinine 1.25 0.6 - 1.3 MG/DL    BUN/Creatinine ratio 7 (L) 12 - 20      GFR est AA >60 >60 ml/min/1.73m2    GFR est non-AA >60 >60 ml/min/1.73m2    Calcium 9.5 8.5 - 10.1 MG/DL    Bilirubin, total 0.5 0.2 - 1.0 MG/DL    ALT (SGPT) 48 16 - 61 U/L    AST (SGOT) 22 10 - 38 U/L    Alk.  phosphatase 88 45 - 117 U/L    Protein, total 8.2 6.4 - 8.2 g/dL    Albumin 4.4 3.4 - 5.0 g/dL    Globulin 3.8 2.0 - 4.0 g/dL    A-G Ratio 1.2 0.8 - 1.7         Radiologic Studies -   XR CHEST SNGL V    (Results Pending)     CT Results  (Last 48 hours)    None        CXR Results  (Last 48 hours)    None            Medical Decision Making   I am the first provider for this patient. I reviewed the vital signs, available nursing notes, past medical history, past surgical history, family history and social history. Vital Signs-Reviewed the patient's vital signs. Pulse Oximetry Analysis - 98% on RA    Records Reviewed: Nursing notes, old medical records and any previous labs, imaging, visits, consultations pertinent to patient care    Procedures:  Procedures    ED Course: Progress Notes, Reevaluation, and Consults:    Provider Notes (Medical Decision Making):     31 yo patient here with coughing, nasal congestion, rhinorrhea for past 3 months. . VSS, exam unremarkable. No retraction,stridor, or wheezing, cxr neg. EKG WNL, labs including cardiac work up negative. S/s c/w sinus infection, URI. F/u w/ PCP and supportive treatment. MED RECONCILIATION:  No current facility-administered medications for this encounter. Current Outpatient Medications   Medication Sig    benzonatate (TESSALON) 200 mg capsule Take 200 mg by mouth three (3) times daily as needed for Cough.  OTHER [facility] chest congestion relief tab 400 mg.    dextroamphetamine-amphetamine (ADDERALL) 30 mg tablet Take 1 Tab by mouth two (2) times a day. Max Daily Amount: 2 Tabs.  ergocalciferol (ERGOCALCIFEROL) 50,000 unit capsule Take 1 Cap by mouth every seven (7) days.  valACYclovir (VALTREX) 1 gram tablet Take 1 Tab by mouth daily.  montelukast (SINGULAIR) 10 mg tablet Take 1 Tab by mouth daily. Disposition:  home    DISCHARGE NOTE:     Pt has been reexamined. Patient has no new complaints, changes, or physical findings. Care plan outlined and precautions discussed. Results of work up, plan of care and treatment plan, expectations, etc were reviewed with the patient.  All medications were reviewed with the patient; will d/c home with outpatient f/u. All of pt's questions and concerns were addressed. Patient was instructed and agrees to follow up with pcp/specialists if indicated, as well as to return to the ED upon further deterioration. Patient is ready to go home. Follow-up Information    None         Current Discharge Medication List              Diagnosis     Clinical Impression:   1. Upper respiratory tract infection, unspecified type    2. Rhinorrhea          Dictation disclaimer:  Please note that this dictation was completed with [employer], the computer voice recognition software. Quite often unanticipated grammatical, syntax, homophones, and other interpretive errors are inadvertently transcribed by the computer software. Please disregard these errors. Please excuse any errors that have escaped final proofreading.

## 2020-02-10 NOTE — ED TRIAGE NOTES
Patient states having heart palpitations today that he attributes to anxiety. States nasal congestion and drainage x months. C/o sinus pain. States that he uses CPAP machine but states that he has not cleaned tubing etc x 3 months. C/o cough and c/o left chest wall pain.

## 2020-02-11 LAB
ATRIAL RATE: 94 BPM
CALCULATED P AXIS, ECG09: 70 DEGREES
CALCULATED R AXIS, ECG10: 80 DEGREES
CALCULATED T AXIS, ECG11: 51 DEGREES
DIAGNOSIS, 93000: NORMAL
P-R INTERVAL, ECG05: 156 MS
Q-T INTERVAL, ECG07: 354 MS
QRS DURATION, ECG06: 110 MS
QTC CALCULATION (BEZET), ECG08: 442 MS
VENTRICULAR RATE, ECG03: 94 BPM

## 2020-02-12 ENCOUNTER — OFFICE VISIT (OUTPATIENT)
Dept: FAMILY MEDICINE CLINIC | Age: 35
End: 2020-02-12

## 2020-02-12 VITALS
OXYGEN SATURATION: 98 % | DIASTOLIC BLOOD PRESSURE: 89 MMHG | WEIGHT: 205.8 LBS | HEIGHT: 69 IN | SYSTOLIC BLOOD PRESSURE: 135 MMHG | TEMPERATURE: 98.2 F | RESPIRATION RATE: 16 BRPM | BODY MASS INDEX: 30.48 KG/M2 | HEART RATE: 86 BPM

## 2020-02-12 DIAGNOSIS — Z91.09 ENVIRONMENTAL ALLERGIES: Primary | ICD-10-CM

## 2020-02-12 DIAGNOSIS — Z71.89 CPAP USE COUNSELING: ICD-10-CM

## 2020-02-12 NOTE — LETTER
NOTIFICATION RETURN TO WORK / SCHOOL 
 
2/12/2020 11:57 AM 
 
Mr. Bev Almodovar [address] [facility] [address] To Whom It May Concern: 
 
Bev Almodovar is currently under the care of Saima Cuevas. He will return to work/school on: 2/13/2020 If there are questions or concerns please have the patient contact our office.  
 
 
 
Sincerely, 
 
 
Neo Snowden NP

## 2020-03-24 DIAGNOSIS — F90.0 ATTENTION DEFICIT HYPERACTIVITY DISORDER (ADHD), PREDOMINANTLY INATTENTIVE TYPE: ICD-10-CM

## 2020-03-24 RX ORDER — DEXTROAMPHETAMINE SACCHARATE, AMPHETAMINE ASPARTATE, DEXTROAMPHETAMINE SULFATE AND AMPHETAMINE SULFATE 7.5; 7.5; 7.5; 7.5 MG/1; MG/1; MG/1; MG/1
30 TABLET ORAL 2 TIMES DAILY
Qty: 60 TAB | Refills: 0 | Status: SHIPPED | OUTPATIENT
Start: 2020-03-24 | End: 2021-01-25

## 2020-03-24 NOTE — TELEPHONE ENCOUNTER
Requested Prescriptions     Pending Prescriptions Disp Refills    dextroamphetamine-amphetamine (ADDERALL) 30 mg tablet 60 Tab 0     Sig: Take 1 Tab by mouth two (2) times a day. Max Daily Amount: 2 Tabs.

## 2020-03-24 NOTE — TELEPHONE ENCOUNTER
[facility]  reports [facility] date for Adderall as 1/21/20 for a 30 d/s. Last Visit: 1/21/20 with MD Martinez  Next Appointment: 4/21/20 with MD Martinez  Previous Refill Encounter(s): 1/21/20 #60    Requested Prescriptions     Pending Prescriptions Disp Refills    dextroamphetamine-amphetamine (ADDERALL) 30 mg tablet 60 Tab 0     Sig: Take 1 Tab by mouth two (2) times a day. Max Daily Amount: 2 Tabs.

## 2020-04-10 ENCOUNTER — TELEPHONE (OUTPATIENT)
Dept: FAMILY MEDICINE CLINIC | Age: 35
End: 2020-04-10

## 2020-04-10 RX ORDER — ZAFIRLUKAST 20 MG/1
20 TABLET, FILM COATED ORAL 2 TIMES DAILY
Qty: 60 TAB | Refills: 5 | Status: SHIPPED | OUTPATIENT
Start: 2020-04-10 | End: 2021-01-25

## 2020-04-10 RX ORDER — MONTELUKAST SODIUM 10 MG/1
10 TABLET ORAL DAILY
Qty: 90 TAB | Refills: 1 | Status: SHIPPED | OUTPATIENT
Start: 2020-04-10 | End: 2020-04-10 | Stop reason: RX

## 2020-04-10 NOTE — TELEPHONE ENCOUNTER
Last visit 01/21/2020 MD Martinez   Next appointment 04/21/2020 MD Martinez   Previous refill encounter(s) 12/23/2019 Singulair #30 with 5 refills     Requested Prescriptions     Pending Prescriptions Disp Refills    montelukast (Singulair) 10 mg tablet 90 Tab 1     Sig: Take 1 Tab by mouth daily.

## 2020-06-05 ENCOUNTER — VIRTUAL VISIT (OUTPATIENT)
Dept: INTERNAL MEDICINE CLINIC | Age: 35
End: 2020-06-05

## 2020-06-05 DIAGNOSIS — H69.82 DYSFUNCTION OF LEFT EUSTACHIAN TUBE: Primary | ICD-10-CM

## 2020-06-05 DIAGNOSIS — H65.112 NON-RECURRENT ACUTE ALLERGIC OTITIS MEDIA OF LEFT EAR: ICD-10-CM

## 2020-06-05 RX ORDER — DOXYCYCLINE 100 MG/1
100 TABLET ORAL 2 TIMES DAILY
Qty: 20 TAB | Refills: 0 | Status: SHIPPED | OUTPATIENT
Start: 2020-06-05 | End: 2020-06-15

## 2020-06-05 RX ORDER — PREDNISONE 10 MG/1
TABLET ORAL
Qty: 21 TAB | Refills: 0 | Status: SHIPPED | OUTPATIENT
Start: 2020-06-05 | End: 2021-01-25

## 2020-06-05 RX ORDER — FLUTICASONE PROPIONATE 50 MCG
2 SPRAY, SUSPENSION (ML) NASAL DAILY
Qty: 1 BOTTLE | Refills: 5 | Status: SHIPPED | OUTPATIENT
Start: 2020-06-05 | End: 2021-01-25

## 2020-06-05 NOTE — PROGRESS NOTES
Mark Gaspar 30 y.o. male who was seen by synchronous (real-time) audio-video technology on 06/05/20    Consent:  heand/or his healthcare decision maker is aware that this patient-initiated Telehealth encounter is a billable service, with coverage as determined by her insurance carrier. he is aware that he may receive a bill and has provided verbal consent to proceed: Yes I was in my office while conducting this encounter. The patient was in their home. Mark Gaspar is a 30 y.o.  male and presents with Ear Pain (left )      SUBJECTIVE:    Ear Pain  Patient complains of ear pain and possible ear infection. Symptoms include left ear pain, plugged sensation in left ear and congestion. Onset of symptoms was 2 days ago, unchanged since that time. He also c/o 2 days post nasal drip. He is drinking plenty of fluids. Respiratory ROS: negative for - shortness of breath  Cardiovascular ROS: negative for - chest pain    Current Outpatient Medications   Medication Sig    fluticasone propionate (FLONASE) 50 mcg/actuation nasal spray 2 Sprays by Both Nostrils route daily.  predniSONE (STERAPRED DS) 10 mg dose pack See administration instruction per 10mg dose pack    doxycycline (ADOXA) 100 mg tablet Take 1 Tab by mouth two (2) times a day for 10 days.  zafirlukast (ACCOLATE) 20 mg tablet Take 1 Tab by mouth two (2) times a day.  dextroamphetamine-amphetamine (ADDERALL) 30 mg tablet Take 1 Tab by mouth two (2) times a day. Max Daily Amount: 2 Tabs.  benzonatate (TESSALON) 200 mg capsule Take 200 mg by mouth three (3) times daily as needed for Cough.  OTHER [facility] chest congestion relief tab 400 mg.  cetirizine (ZYRTEC) 10 mg tablet Take 1 Tab by mouth daily.  ergocalciferol (ERGOCALCIFEROL) 50,000 unit capsule Take 1 Cap by mouth every seven (7) days.  valACYclovir (VALTREX) 1 gram tablet Take 1 Tab by mouth daily.      No current facility-administered medications for this visit. OBJECTIVE:  alert, well appearing, and in no distress  There were no vitals taken for this visit. well developed and well nourished              Assessment/Plan      ICD-10-CM ICD-9-CM    1. Dysfunction of left eustachian tube H69.82 381.81 fluticasone propionate (FLONASE) 50 mcg/actuation nasal spray      predniSONE (STERAPRED DS) 10 mg dose pack only in 2 days if Flonase is not working    2. Non-recurrent acute allergic otitis media of left ear H65.112 381.04 doxycycline (ADOXA) 100 mg tablet           Reviewed plan of care. Patient has provided input and agrees with goals.

## 2020-09-30 ENCOUNTER — HOSPITAL ENCOUNTER (EMERGENCY)
Age: 35
Discharge: HOME | End: 2020-09-30
Attending: EMERGENCY MEDICINE | Admitting: EMERGENCY MEDICINE
Payer: COMMERCIAL

## 2020-09-30 VITALS
OXYGEN SATURATION: 100 % | SYSTOLIC BLOOD PRESSURE: 161 MMHG | DIASTOLIC BLOOD PRESSURE: 89 MMHG | TEMPERATURE: 98.6 F | HEART RATE: 71 BPM | RESPIRATION RATE: 16 BRPM

## 2020-09-30 DIAGNOSIS — R10.9 LEFT FLANK PAIN: Primary | ICD-10-CM

## 2020-09-30 LAB
APPEARANCE UR: CLEAR
BILIRUB UR QL: NEGATIVE
COLOR UR: YELLOW
GLUCOSE UR STRIP.AUTO-MCNC: NEGATIVE MG/DL
HGB UR QL STRIP: NEGATIVE
KETONES UR QL STRIP.AUTO: NEGATIVE MG/DL
LEUKOCYTE ESTERASE UR QL STRIP.AUTO: NEGATIVE
NITRITE UR QL STRIP.AUTO: NEGATIVE
PH UR STRIP: 7.5 [PH] (ref 5–8)
PROT UR STRIP-MCNC: NEGATIVE MG/DL
SP GR UR REFRACTOMETRY: 1.02 (ref 1–1.03)
UROBILINOGEN UR QL STRIP.AUTO: 0.2 EU/DL (ref 0.2–1)

## 2020-09-30 PROCEDURE — 81003 URINALYSIS AUTO W/O SCOPE: CPT

## 2020-09-30 PROCEDURE — 99284 EMERGENCY DEPT VISIT MOD MDM: CPT

## 2020-09-30 RX ORDER — IBUPROFEN 800 MG/1
TABLET ORAL
Qty: 15 TAB | Refills: 0 | Status: SHIPPED | OUTPATIENT
Start: 2020-09-30 | End: 2021-01-25

## 2020-09-30 RX ORDER — CYCLOBENZAPRINE HCL 10 MG
10 TABLET ORAL
Qty: 15 TAB | Refills: 0 | Status: SHIPPED | OUTPATIENT
Start: 2020-09-30 | End: 2021-01-25

## 2020-09-30 NOTE — LETTER
[facility] EMERGENCY DEPT 
[address] [address] [phone] Work/School Note Date: 9/30/2020 To Whom It May concern: 
 
Wild Reyez was seen and treated today in the emergency room by the following provider(s): 
Attending Provider: Matt Pink MD. Wild Reyez may return to work on October 1, 2020.  
 
Sincerely, 
 
 
 
 
Bess Daugherty MD

## 2020-09-30 NOTE — ED TRIAGE NOTES
Pt C/O left sided back pain that radiates to lower abdomen.     Pt also c/o testicle pain but denies swelling/injury or exposure to STDs

## 2020-09-30 NOTE — ED PROVIDER NOTES
HPI patient complains of a 4 to 5-day history of intermittent pain and soreness in the left lower back. He describes as a dull aching pain that is radiates laterally and down into his left testicle. He says the pain in his back is fairly constant and the radiation into his testicle will last for a few seconds and then resolves. He denies any history of injury or fall. He denies any changes in bowel or bladder habits. He denies any urethral drainage or discharge. He denies any nausea or vomiting. He has not taken any medications with this. He says the symptoms are not related to position or activity. No other complaints given at this time.   Past Medical History:   Diagnosis Date    Condyloma acuminatum     Environmental allergies     Groin pain     Obstructive sleep apnea on CPAP     CATARINA (obstructive sleep apnea)     Plantar fasciitis     Sinusitis     Tinea pedis        Past Surgical History:   Procedure Laterality Date    HX OTHER SURGICAL      dental         Family History:   Problem Relation Age of Onset    Hypertension Mother     Diabetes Mother     Hypertension Father        Social History     Socioeconomic History    Marital status:      Spouse name: Not on file    Number of children: Not on file    Years of education: Not on file    Highest education level: Not on file   Occupational History    Not on file   Social Needs    Financial resource strain: Not on file    Food insecurity     Worry: Not on file     Inability: Not on file    Transportation needs     Medical: Not on file     Non-medical: Not on file   Tobacco Use    Smoking status: Never Smoker    Smokeless tobacco: Never Used   Substance and Sexual Activity    Alcohol use: No    Drug use: No    Sexual activity: Not on file   Lifestyle    Physical activity     Days per week: Not on file     Minutes per session: Not on file    Stress: Not on file   Relationships    Social connections     Talks on phone: Not on file     Gets together: Not on file     Attends Mandaeism service: Not on file     Active member of club or organization: Not on file     Attends meetings of clubs or organizations: Not on file     Relationship status: Not on file    Intimate partner violence     Fear of current or ex partner: Not on file     Emotionally abused: Not on file     Physically abused: Not on file     Forced sexual activity: Not on file   Other Topics Concern    Not on file   Social History Narrative    Not on file         ALLERGIES: Penicillins; Mold extracts; and Peanut    Review of Systems   Constitutional: Negative. HENT: Negative. Eyes: Negative. Respiratory: Negative. Cardiovascular: Negative. Gastrointestinal: Negative. Genitourinary: Negative. Musculoskeletal: Negative. Neurological: Negative. Psychiatric/Behavioral: Negative. Vitals:    09/30/20 0901 09/30/20 0902 09/30/20 0903 09/30/20 0906   BP: (!) 141/83   (!) 141/83   Pulse:    71   Resp:    16   Temp:    98.6 °F (37 °C)   SpO2: 98% 99% 98% 100%            Physical Exam  Vitals signs and nursing note reviewed. Constitutional:       Appearance: He is well-developed. HENT:      Head: Normocephalic and atraumatic. Eyes:      Pupils: Pupils are equal, round, and reactive to light. Neck:      Musculoskeletal: Neck supple. Cardiovascular:      Rate and Rhythm: Normal rate and regular rhythm. Pulmonary:      Effort: Pulmonary effort is normal.      Breath sounds: Normal breath sounds. Abdominal:      Palpations: Abdomen is soft. Genitourinary:     Penis: Normal.       Scrotum/Testes: Normal.      Comments: Testicles are descended bilaterally and nontender with no scrotal masses. No hernias are noted. No urethral discharge or drainage. Musculoskeletal: Normal range of motion. Skin:     General: Skin is warm and dry. Neurological:      Mental Status: He is alert and oriented to person, place, and time.           [facility] Procedures

## 2020-09-30 NOTE — ED NOTES
Pt discharged. Pt provided with discharge instructions and flu shot reminder. Pt verbalizes understanding.

## 2020-09-30 NOTE — DISCHARGE INSTRUCTIONS
Patient Education        Flank Pain: Care Instructions  Your Care Instructions  Flank pain is pain on the side of the back just below the rib cage and above the waist. It can be on one or both sides. Flank pain has many possible causes, including a kidney stone, a urinary tract infection, or back strain. Flank pain may get better on its own. But don't ignore new symptoms, such as fever, nausea and vomiting, urination problems, pain that gets worse, and dizziness. These may be signs of a more serious problem. You may have to have tests or other treatment. Follow-up care is a key part of your treatment and safety. Be sure to make and go to all appointments, and call your doctor if you are having problems. It's also a good idea to know your test results and keep a list of the medicines you take. How can you care for yourself at home? · Rest until you feel better. · Take pain medicines exactly as directed. ? If the doctor gave you a prescription medicine for pain, take it as prescribed. ? If you are not taking a prescription pain medicine, ask your doctor if you can take an over-the-counter pain medicine, such as acetaminophen (Tylenol), ibuprofen (Advil, Motrin), or naproxen (Aleve). Read and follow all instructions on the label. · If your doctor prescribed antibiotics, take them as directed. Do not stop taking them just because you feel better. You need to take the full course of antibiotics. · To apply heat, put a warm water bottle, a heating pad set on low, or a warm cloth on the painful area. Do not go to sleep with a heating pad on your skin. · To prevent dehydration, drink plenty of fluids, enough so that your urine is light yellow or clear like water. Choose water and other caffeine-free clear liquids until you feel better. If you have kidney, heart, or liver disease and have to limit fluids, talk with your doctor before you increase the amount of fluids you drink. When should you call for help? Call your doctor now or seek immediate medical care if:    · Your flank pain gets worse.     · You have new symptoms, such as fever, nausea, or vomiting.     · You have symptoms of a urinary problem. For example:  ? You have blood or pus in your urine. ? You have chills or body aches. ? It hurts to urinate. ? You have groin or belly pain. Watch closely for changes in your health, and be sure to contact your doctor if you do not get better as expected. Where can you learn more? Go to http://paulina-cale.info/  Enter S191 in the search box to learn more about \"Flank Pain: Care Instructions. \"  Current as of: June 26, 2019               Content Version: 12.6  © 3372-0476 [employer]. Care instructions adapted under license by [employer] (which disclaims liability or warranty for this information). If you have questions about a medical condition or this instruction, always ask your healthcare professional. [place] [address] any warranty or liability for your use of this information.

## 2021-01-25 ENCOUNTER — APPOINTMENT (OUTPATIENT)
Dept: GENERAL RADIOLOGY | Age: 36
End: 2021-01-25
Attending: EMERGENCY MEDICINE
Payer: COMMERCIAL

## 2021-01-25 ENCOUNTER — HOSPITAL ENCOUNTER (EMERGENCY)
Age: 36
Discharge: HOME | End: 2021-01-25
Attending: EMERGENCY MEDICINE
Payer: COMMERCIAL

## 2021-01-25 VITALS
RESPIRATION RATE: 17 BRPM | OXYGEN SATURATION: 100 % | HEIGHT: 69 IN | TEMPERATURE: 98.3 F | BODY MASS INDEX: 30.39 KG/M2 | HEART RATE: 74 BPM | DIASTOLIC BLOOD PRESSURE: 106 MMHG | SYSTOLIC BLOOD PRESSURE: 165 MMHG

## 2021-01-25 DIAGNOSIS — M53.3 PAIN OF LEFT SACROILIAC JOINT: ICD-10-CM

## 2021-01-25 DIAGNOSIS — I10 HYPERTENSION, UNSPECIFIED TYPE: ICD-10-CM

## 2021-01-25 DIAGNOSIS — R07.9 CHEST PAIN, UNSPECIFIED TYPE: Primary | ICD-10-CM

## 2021-01-25 LAB
ALBUMIN SERPL-MCNC: 3.8 G/DL (ref 3.4–5)
ALBUMIN/GLOB SERPL: 1 {RATIO} (ref 0.8–1.7)
ALP SERPL-CCNC: 76 U/L (ref 45–117)
ALT SERPL-CCNC: 39 U/L (ref 16–61)
ANION GAP SERPL CALC-SCNC: 1 MMOL/L (ref 3–18)
AST SERPL-CCNC: 29 U/L (ref 10–38)
ATRIAL RATE: 75 BPM
BASOPHILS # BLD: 0 K/UL (ref 0–0.1)
BASOPHILS NFR BLD: 0 % (ref 0–2)
BILIRUB SERPL-MCNC: 0.2 MG/DL (ref 0.2–1)
BUN SERPL-MCNC: 13 MG/DL (ref 7–18)
BUN/CREAT SERPL: 10 (ref 12–20)
CALCIUM SERPL-MCNC: 9.2 MG/DL (ref 8.5–10.1)
CALCULATED P AXIS, ECG09: 40 DEGREES
CALCULATED R AXIS, ECG10: 58 DEGREES
CALCULATED T AXIS, ECG11: 40 DEGREES
CHLORIDE SERPL-SCNC: 106 MMOL/L (ref 100–111)
CO2 SERPL-SCNC: 31 MMOL/L (ref 21–32)
CREAT SERPL-MCNC: 1.26 MG/DL (ref 0.6–1.3)
DIAGNOSIS, 93000: NORMAL
DIFFERENTIAL METHOD BLD: ABNORMAL
EOSINOPHIL # BLD: 0.1 K/UL (ref 0–0.4)
EOSINOPHIL NFR BLD: 2 % (ref 0–5)
ERYTHROCYTE [DISTWIDTH] IN BLOOD BY AUTOMATED COUNT: 13.3 % (ref 11.6–14.5)
GLOBULIN SER CALC-MCNC: 3.8 G/DL (ref 2–4)
GLUCOSE SERPL-MCNC: 98 MG/DL (ref 74–99)
HCT VFR BLD AUTO: 40.2 % (ref 36–48)
HGB BLD-MCNC: 13.8 G/DL (ref 13–16)
LYMPHOCYTES # BLD: 2.5 K/UL (ref 0.9–3.6)
LYMPHOCYTES NFR BLD: 47 % (ref 21–52)
MCH RBC QN AUTO: 31.7 PG (ref 24–34)
MCHC RBC AUTO-ENTMCNC: 34.3 G/DL (ref 31–37)
MCV RBC AUTO: 92.4 FL (ref 74–97)
MONOCYTES # BLD: 0.6 K/UL (ref 0.05–1.2)
MONOCYTES NFR BLD: 11 % (ref 3–10)
NEUTS SEG # BLD: 2.1 K/UL (ref 1.8–8)
NEUTS SEG NFR BLD: 40 % (ref 40–73)
P-R INTERVAL, ECG05: 168 MS
PLATELET # BLD AUTO: 213 K/UL (ref 135–420)
PMV BLD AUTO: 9.2 FL (ref 9.2–11.8)
POTASSIUM SERPL-SCNC: 4 MMOL/L (ref 3.5–5.5)
PROT SERPL-MCNC: 7.6 G/DL (ref 6.4–8.2)
Q-T INTERVAL, ECG07: 372 MS
QRS DURATION, ECG06: 106 MS
QTC CALCULATION (BEZET), ECG08: 415 MS
RBC # BLD AUTO: 4.35 M/UL (ref 4.7–5.5)
SODIUM SERPL-SCNC: 138 MMOL/L (ref 136–145)
TROPONIN I SERPL-MCNC: <0.02 NG/ML (ref 0–0.04)
VENTRICULAR RATE, ECG03: 75 BPM
WBC # BLD AUTO: 5.3 K/UL (ref 4.6–13.2)

## 2021-01-25 PROCEDURE — 84484 ASSAY OF TROPONIN QUANT: CPT

## 2021-01-25 PROCEDURE — 99284 EMERGENCY DEPT VISIT MOD MDM: CPT

## 2021-01-25 PROCEDURE — 93005 ELECTROCARDIOGRAM TRACING: CPT

## 2021-01-25 PROCEDURE — 71046 X-RAY EXAM CHEST 2 VIEWS: CPT

## 2021-01-25 PROCEDURE — 73502 X-RAY EXAM HIP UNI 2-3 VIEWS: CPT

## 2021-01-25 PROCEDURE — 80053 COMPREHEN METABOLIC PANEL: CPT

## 2021-01-25 PROCEDURE — 85025 COMPLETE CBC W/AUTO DIFF WBC: CPT

## 2021-01-25 RX ORDER — IBUPROFEN 800 MG/1
800 TABLET ORAL EVERY 8 HOURS
Qty: 30 TAB | Refills: 0 | Status: SHIPPED | OUTPATIENT
Start: 2021-01-25 | End: 2021-02-04

## 2021-01-25 RX ORDER — LISINOPRIL AND HYDROCHLOROTHIAZIDE 10; 12.5 MG/1; MG/1
1 TABLET ORAL DAILY
Qty: 30 TAB | Refills: 0 | Status: SHIPPED | OUTPATIENT
Start: 2021-01-25 | End: 2021-02-03 | Stop reason: SDUPTHER

## 2021-01-25 NOTE — ED TRIAGE NOTES
Patient has c/o pain in left hip and left sided chest/rib area. He has had pain for \"years\". His hip pain started after falling down a stair well years ago and his chest/rib pain after picking up a child and felt something pull in that area. He states he has had pain since then. He is also concerned about his BP. He states he checked it the other day and systolic was 549.

## 2021-01-25 NOTE — ED PROVIDER NOTES
EMERGENCY DEPARTMENT HISTORY AND PHYSICAL EXAM    Date: 1/25/2021  Patient Name: Catarina Brar    History of Presenting Illness     Chief Complaint   Patient presents with    Hip Pain    Chest Pain     left sided chest pain \"for years\"     Hypertension         History Provided By: Patient    Additional History (Context): Catarina Brar is a 37 y.o. male with obesity and CATARINA on CPAP who presents with complaint of intermittent exertional chest pain for a year. Pain does not radiate. Denies personal history of high blood pressure diabetes high cholesterol family history of an early MI prior cardiology evaluation daily aspirin. He does not smoke or drink. Additionally, he he is having left posterior hip pain with the past few months. Pain is worse with prolonged sitting and then rising to a standing position and sometimes walking. Pain does not radiate. Denies history of IVDU rash fever trauma saddle anesthesia bowel incontinence urinary retention rectal pain or unintentional weight loss in the past 6 months. He says \"I have found additional weight\". Has been told BP elevated previously. PCP: Naina Walker MD    Current Outpatient Medications   Medication Sig Dispense Refill    ibuprofen (MOTRIN) 800 mg tablet Take 1 Tab by mouth every eight (8) hours for 10 days. 30 Tab 0    lisinopril-hydroCHLOROthiazide (PRINZIDE, ZESTORETIC) 10-12.5 mg per tablet Take 1 Tab by mouth daily. 30 Tab 0    ergocalciferol (ERGOCALCIFEROL) 50,000 unit capsule Take 1 Cap by mouth every seven (7) days.  12 Cap 3       Past History     Past Medical History:  Past Medical History:   Diagnosis Date    Condyloma acuminatum     Environmental allergies     Groin pain     Obstructive sleep apnea on CPAP     CATARINA (obstructive sleep apnea)     Plantar fasciitis     Sinusitis     Tinea pedis        Past Surgical History:  Past Surgical History:   Procedure Laterality Date    HX OTHER SURGICAL      dental Family History:  Family History   Problem Relation Age of Onset    Hypertension Mother     Diabetes Mother     Hypertension Father        Social History:  Social History     Tobacco Use    Smoking status: Never Smoker    Smokeless tobacco: Never Used   Substance Use Topics    Alcohol use: No    Drug use: No       Allergies: Allergies   Allergen Reactions    Penicillins Rash, Swelling and Hives    Mold Extracts Rash    Peanut Rash and Swelling         Review of Systems   Review of Systems   Constitutional: Negative for fever and unexpected weight change. Respiratory: Negative for shortness of breath. Cardiovascular: Positive for chest pain. Gastrointestinal: Negative for rectal pain. Musculoskeletal: Positive for arthralgias and gait problem. Skin: Negative for rash. Neurological: Negative for weakness and numbness. All Other Systems Negative  Physical Exam     Vitals:    01/25/21 1154 01/25/21 1245 01/25/21 1300 01/25/21 1315   BP: (!) 146/96 (!) 147/97 (!) 150/102 (!) 165/106   Pulse: 83 79 75 74   Resp: 16 20 19 17   Temp: 98.3 °F (36.8 °C)      SpO2: 100% 98% (!) 83% 100%   Height: 5' 9\" (1.753 m)        Physical Exam  Vitals signs and nursing note reviewed. Constitutional:       General: He is not in acute distress. Appearance: He is well-developed. He is obese. He is not ill-appearing, toxic-appearing or diaphoretic. HENT:      Head: Normocephalic and atraumatic. Neck:      Musculoskeletal: Normal range of motion and neck supple. Thyroid: No thyromegaly. Vascular: No carotid bruit. Trachea: No tracheal deviation. Cardiovascular:      Rate and Rhythm: Normal rate and regular rhythm. Heart sounds: Normal heart sounds. No murmur. No friction rub. No gallop. Comments: Equal radial pulses. Pulmonary:      Effort: Pulmonary effort is normal. No respiratory distress. Breath sounds: Normal breath sounds. No stridor. No wheezing or rales. Chest:      Chest wall: No tenderness. Abdominal:      General: There is no distension. Palpations: Abdomen is soft. There is no mass. Tenderness: There is no abdominal tenderness. There is no guarding or rebound. Comments: No pulsatile mass palpated. Musculoskeletal: Normal range of motion. Left lower leg: He exhibits tenderness. Comments: Left SI joint tenderness. Pain with marching in place. Bilateral lower extremity strength 5 out of 5. DP PT pulses palpable. Skin:     General: Skin is warm and dry. Coloration: Skin is not pale. Neurological:      Mental Status: He is alert. Psychiatric:         Speech: Speech normal.         Behavior: Behavior normal.         Thought Content: Thought content normal.         Judgment: Judgment normal.            Diagnostic Study Results     Labs -     Recent Results (from the past 12 hour(s))   CBC WITH AUTOMATED DIFF    Collection Time: 01/25/21 12:31 PM   Result Value Ref Range    WBC 5.3 4.6 - 13.2 K/uL    RBC 4.35 (L) 4.70 - 5.50 M/uL    HGB 13.8 13.0 - 16.0 g/dL    HCT 40.2 36.0 - 48.0 %    MCV 92.4 74.0 - 97.0 FL    MCH 31.7 24.0 - 34.0 PG    MCHC 34.3 31.0 - 37.0 g/dL    RDW 13.3 11.6 - 14.5 %    PLATELET 459 035 - 694 K/uL    MPV 9.2 9.2 - 11.8 FL    NEUTROPHILS 40 40 - 73 %    LYMPHOCYTES 47 21 - 52 %    MONOCYTES 11 (H) 3 - 10 %    EOSINOPHILS 2 0 - 5 %    BASOPHILS 0 0 - 2 %    ABS. NEUTROPHILS 2.1 1.8 - 8.0 K/UL    ABS. LYMPHOCYTES 2.5 0.9 - 3.6 K/UL    ABS. MONOCYTES 0.6 0.05 - 1.2 K/UL    ABS. EOSINOPHILS 0.1 0.0 - 0.4 K/UL    ABS.  BASOPHILS 0.0 0.0 - 0.1 K/UL    DF AUTOMATED     METABOLIC PANEL, COMPREHENSIVE    Collection Time: 01/25/21 12:31 PM   Result Value Ref Range    Sodium 138 136 - 145 mmol/L    Potassium 4.0 3.5 - 5.5 mmol/L    Chloride 106 100 - 111 mmol/L    CO2 31 21 - 32 mmol/L    Anion gap 1 (L) 3.0 - 18 mmol/L    Glucose 98 74 - 99 mg/dL    BUN 13 7.0 - 18 MG/DL    Creatinine 1.26 0.6 - 1.3 MG/DL BUN/Creatinine ratio 10 (L) 12 - 20      GFR est AA >60 >60 ml/min/1.73m2    GFR est non-AA >60 >60 ml/min/1.73m2    Calcium 9.2 8.5 - 10.1 MG/DL    Bilirubin, total 0.2 0.2 - 1.0 MG/DL    ALT (SGPT) 39 16 - 61 U/L    AST (SGOT) 29 10 - 38 U/L    Alk. phosphatase 76 45 - 117 U/L    Protein, total 7.6 6.4 - 8.2 g/dL    Albumin 3.8 3.4 - 5.0 g/dL    Globulin 3.8 2.0 - 4.0 g/dL    A-G Ratio 1.0 0.8 - 1.7     TROPONIN I    Collection Time: 01/25/21 12:31 PM   Result Value Ref Range    Troponin-I, QT <0.02 0.0 - 0.045 NG/ML   EKG, 12 LEAD, INITIAL    Collection Time: 01/25/21 12:33 PM   Result Value Ref Range    Ventricular Rate 75 BPM    Atrial Rate 75 BPM    P-R Interval 168 ms    QRS Duration 106 ms    Q-T Interval 372 ms    QTC Calculation (Bezet) 415 ms    Calculated P Axis 40 degrees    Calculated R Axis 58 degrees    Calculated T Axis 40 degrees    Diagnosis       Normal sinus rhythm  Normal ECG  When compared with ECG of 10-FEB-2020 17:17,  No significant change was found  Confirmed by Leland Fitch MD, --- ([id number]) on 1/25/2021 1:33:20 PM         Radiologic Studies -   XR HIP LT W OR WO PELV 2-3 VWS   Final Result   No acute abnormality of the left hip. XR CHEST PA LAT   Final Result   No acute cardiopulmonary process. CT Results  (Last 48 hours)    None        CXR Results  (Last 48 hours)               01/25/21 1210  XR CHEST PA LAT Final result    Impression:  No acute cardiopulmonary process. Narrative:  TWO VIEW CHEST RADIOGRAPH        INDICATION: chest/rib pain. Chest pain and rib pain for years. No known injury. COMPARISON: Chest radiograph 2/10/2020. TECHNIQUE: PA and lateral views of the chest.           FINDINGS:       MEDIASTINUM: Cardiac silhouette is within normal limits. LUNGS: Lungs are clear. BONES/OTHER: No acute osseous abnormality. Medical Decision Making   I am the first provider for this patient.     I reviewed the vital signs, available nursing notes, past medical history, past surgical history, family history and social history. Vital Signs-Reviewed the patient's vital signs. Procedures:  Procedures    Provider Notes (Medical Decision Making): A years worth of nonprogressive chest discomfort on the left side as well as now left hip pain. Treat his blood pressure have him follow-up with the PCP for outpatient follow-up. Address his chest discomfort and hip pain with just ibuprofen. MED RECONCILIATION:  No current facility-administered medications for this encounter. Current Outpatient Medications   Medication Sig    ibuprofen (MOTRIN) 800 mg tablet Take 1 Tab by mouth every eight (8) hours for 10 days.  lisinopril-hydroCHLOROthiazide (PRINZIDE, ZESTORETIC) 10-12.5 mg per tablet Take 1 Tab by mouth daily.  ergocalciferol (ERGOCALCIFEROL) 50,000 unit capsule Take 1 Cap by mouth every seven (7) days. Disposition:  home    DISCHARGE NOTE:   1:48 PM    Pt has been reexamined. Patient has no new complaints, changes, or physical findings. Care plan outlined and precautions discussed. Results of labs, CXR, hip x-rays were reviewed with the patient. All medications were reviewed with the patient; will d/c home with ibuprofen. All of pt's questions and concerns were addressed. Patient was instructed and agrees to follow up with PCP, as well as to return to the ED upon further deterioration. Patient is ready to go home.     Follow-up Information     Follow up With Specialties Details Why Contact Info    Shaylee Gant MD Internal Medicine Schedule an appointment as soon as possible for a visit in 2 days  [address] [facility]  [address]  [id number]  Marilu Strickland MD Internal Medicine Schedule an appointment as soon as possible for a visit in 1 day  [address] [address] [phone] [address] [facility] DEPT Emergency Medicine  If symptoms worsen return immediately [address] [facility]  [phone]          Current Discharge Medication List      START taking these medications    Details   ibuprofen (MOTRIN) 800 mg tablet Take 1 Tab by mouth every eight (8) hours for 10 days. Qty: 30 Tab, Refills: 0      lisinopril-hydroCHLOROthiazide (PRINZIDE, ZESTORETIC) 10-12.5 mg per tablet Take 1 Tab by mouth daily. Qty: 30 Tab, Refills: 0               Diagnosis     Clinical Impression:   1. Chest pain, unspecified type    2. Hypertension, unspecified type    3.  Pain of left sacroiliac joint

## 2021-01-27 ENCOUNTER — TELEPHONE (OUTPATIENT)
Dept: INTERNAL MEDICINE CLINIC | Age: 36
End: 2021-01-27

## 2021-01-27 NOTE — TELEPHONE ENCOUNTER
Pt was seen in ed 01/25- for elevated bp ,chest pain along w/ hip pain he is asking for a face to face , he does not want a virtual please advise

## 2021-02-03 ENCOUNTER — OFFICE VISIT (OUTPATIENT)
Dept: INTERNAL MEDICINE CLINIC | Age: 36
End: 2021-02-03
Payer: COMMERCIAL

## 2021-02-03 VITALS
HEIGHT: 69 IN | OXYGEN SATURATION: 97 % | RESPIRATION RATE: 20 BRPM | BODY MASS INDEX: 38.95 KG/M2 | HEART RATE: 92 BPM | DIASTOLIC BLOOD PRESSURE: 89 MMHG | TEMPERATURE: 97.2 F | SYSTOLIC BLOOD PRESSURE: 133 MMHG | WEIGHT: 263 LBS

## 2021-02-03 DIAGNOSIS — F33.41 RECURRENT MAJOR DEPRESSIVE DISORDER, IN PARTIAL REMISSION (HCC): ICD-10-CM

## 2021-02-03 DIAGNOSIS — E66.01 CLASS 2 SEVERE OBESITY DUE TO EXCESS CALORIES WITH SERIOUS COMORBIDITY AND BODY MASS INDEX (BMI) OF 38.0 TO 38.9 IN ADULT (HCC): ICD-10-CM

## 2021-02-03 DIAGNOSIS — S39.012A BACK STRAIN, INITIAL ENCOUNTER: ICD-10-CM

## 2021-02-03 DIAGNOSIS — S29.011A INTERCOSTAL MUSCLE STRAIN, INITIAL ENCOUNTER: ICD-10-CM

## 2021-02-03 DIAGNOSIS — E55.9 VITAMIN D DEFICIENCY: ICD-10-CM

## 2021-02-03 DIAGNOSIS — I10 ESSENTIAL HYPERTENSION: Primary | ICD-10-CM

## 2021-02-03 PROCEDURE — 99214 OFFICE O/P EST MOD 30 MIN: CPT | Performed by: INTERNAL MEDICINE

## 2021-02-03 RX ORDER — LISINOPRIL AND HYDROCHLOROTHIAZIDE 10; 12.5 MG/1; MG/1
1 TABLET ORAL DAILY
Qty: 90 TAB | Refills: 1 | Status: SHIPPED | OUTPATIENT
Start: 2021-02-03 | End: 2022-03-14 | Stop reason: SDUPTHER

## 2021-02-03 RX ORDER — ASPIRIN 325 MG
TABLET, DELAYED RELEASE (ENTERIC COATED) ORAL
COMMUNITY
Start: 2021-01-25 | End: 2022-05-25

## 2021-02-03 RX ORDER — CYCLOBENZAPRINE HCL 10 MG
10 TABLET ORAL
Qty: 30 TAB | Refills: 1 | OUTPATIENT
Start: 2021-02-03 | End: 2022-05-18

## 2021-02-03 RX ORDER — CLOBETASOL PROPIONATE 0.5 MG/G
OINTMENT TOPICAL
COMMUNITY
Start: 2020-11-19 | End: 2022-07-12

## 2021-02-03 NOTE — PATIENT INSTRUCTIONS
A Healthy Lifestyle: Care Instructions Your Care Instructions A healthy lifestyle can help you feel good, stay at a healthy weight, and have plenty of energy for both work and play. A healthy lifestyle is something you can share with your whole family. A healthy lifestyle also can lower your risk for serious health problems, such as high blood pressure, heart disease, and diabetes. You can follow a few steps listed below to improve your health and the health of your family. Follow-up care is a key part of your treatment and safety. Be sure to make and go to all appointments, and call your doctor if you are having problems. It's also a good idea to know your test results and keep a list of the medicines you take. How can you care for yourself at home? · Do not eat too much sugar, fat, or fast foods. You can still have dessert and treats now and then. The goal is moderation. · Start small to improve your eating habits. Pay attention to portion sizes, drink less juice and soda pop, and eat more fruits and vegetables. ? Eat a healthy amount of food. A 3-ounce serving of meat, for example, is about the size of a deck of cards. Fill the rest of your plate with vegetables and whole grains. ? Limit the amount of soda and sports drinks you have every day. Drink more water when you are thirsty. ? Eat at least 5 servings of fruits and vegetables every day. It may seem like a lot, but it is not hard to reach this goal. A serving or helping is 1 piece of fruit, 1 cup of vegetables, or 2 cups of leafy, raw vegetables. Have an apple or some carrot sticks as an afternoon snack instead of a candy bar.  Try to have fruits and/or vegetables at every meal. 
 · Make exercise part of your daily routine. You may want to start with simple activities, such as walking, bicycling, or slow swimming. Try to be active 30 to 60 minutes every day. You do not need to do all 30 to 60 minutes all at once. For example, you can exercise 3 times a day for 10 or 20 minutes. Moderate exercise is safe for most people, but it is always a good idea to talk to your doctor before starting an exercise program. 
· Keep moving. Patti Delarosai the lawn, work in the garden, or [employer]. Take the stairs instead of the elevator at work. · If you smoke, quit. People who smoke have an increased risk for heart attack, stroke, cancer, and other lung illnesses. Quitting is hard, but there are ways to boost your chance of quitting tobacco for good. ? Use nicotine gum, patches, or lozenges. ? Ask your doctor about stop-smoking programs and medicines. ? Keep trying. In addition to reducing your risk of diseases in the future, you will notice some benefits soon after you stop using tobacco. If you have shortness of breath or asthma symptoms, they will likely get better within a few weeks after you quit. · Limit how much alcohol you drink. Moderate amounts of alcohol (up to 2 drinks a day for men, 1 drink a day for women) are okay. But drinking too much can lead to liver problems, high blood pressure, and other health problems. Family health If you have a family, there are many things you can do together to improve your health. · Eat meals together as a family as often as possible. · Eat healthy foods. This includes fruits, vegetables, lean meats and dairy, and whole grains. · Include your family in your fitness plan. Most people think of activities such as jogging or tennis as the way to fitness, but there are many ways you and your family can be more active. Anything that makes you breathe hard and gets your heart pumping is exercise. Here are some tips: ? Walk to do errands or to take your child to school or the bus. 
? Go for a family bike ride after dinner instead of watching TV. Where can you learn more? Go to http://www.gray.com/ Enter C705 in the search box to learn more about \"A Healthy Lifestyle: Care Instructions. \" Current as of: January 31, 2020               Content Version: 12.6 © 2006-2020 [employer], [employer]. Care instructions adapted under license by [employer] (which disclaims liability or warranty for this information). If you have questions about a medical condition or this instruction, always ask your healthcare professional. [address] any warranty or liability for your use of this information.

## 2021-02-03 NOTE — PROGRESS NOTES
Patient is in the office today for a ED follow up. Patient states he is having right sided rib pain. Patient states he has had rib pain on and off for about 1 year when he lifted a heavy child over a railing. Patient also states his BP has been elevated. Patient is asking for a refill on Valtrex for cold sores. Patient states the pharmacy gave him Vitamin D 3 instead Vitamin D2 and wanted t know if this needs to be changed. 1. Have you been to the ER, urgent care clinic since your last visit? Hospitalized since your last visit? yes, [facility]     2. Have you seen or consulted any other health care providers outside of the [address] [place] [address] since your last visit? Include any pap smears or colon screening.  No

## 2021-02-07 DIAGNOSIS — E55.9 VITAMIN D DEFICIENCY: ICD-10-CM

## 2021-02-07 DIAGNOSIS — I10 ESSENTIAL HYPERTENSION: ICD-10-CM

## 2021-02-07 NOTE — PROGRESS NOTES
Zain Cutler is a 37 y.o.  male and presents with ED Follow-up ([facility]), Hypertension, Depression, Back Pain, Chest Pain, Vitamin D Deficiency, and Obesity      SUBJECTIVE:  Patient's blood pressure has been borderline controlled since 2018. He is actually been at stage I hypertension since then. Unfortunately he has been unable to lose weight and his BMI is actually up to 38.8 which is most likely the contributing factor. Patient went to emergency room on January 25 and was started on Zestoretic. His blood pressure today is much improved. Back Pain  Patient presents for presents evaluation of low back problems. Symptoms have been present for several weeks and include pain in lower back (aching in character; 2/10 in severity). Initial inciting event: none. Symptoms are worst: morning. Alleviating factors identifiable by patient are recumbency. Exacerbating factors identifiable by patient are standing, walking. Treatments so far initiated by patient: none Previous lower back problems: none. Previous workup: none. Previous treatments: none. Chest Wall Pain  Patient presents for presents evaluation of chest wall pain. Onset was 1 year ago. Pain description: location: costochondral region: upper: left:  severity = 2 out of 10   Mechanism of injury: occurred over a year ago but never completely healed. Previous visits for this problem: none. Evaluation to date: pt went to ER 1/25/21 and had normal CXR   Treatment to date: none    She has history of depression and anxiety for which he was doing therapy however his therapist office [facility] closed and he currently does not have a therapist.  He does not tolerate anxiety medications well and would like to try and find another therapist.  We will give him a list of therapists that we have on file.      Patient has a history of vitamin D deficiency for which she is actually on vitamin D 3 50,000 units/week    Respiratory ROS: negative for - shortness of breath  Cardiovascular ROS: negative for - palpitations     Current Outpatient Medications   Medication Sig    clobetasoL (TEMOVATE) 0.05 % ointment APPLY SPARINGLY TO AFFECTED AREA(S) TWICE DAILY DO NOT USE ON FACE OR GROIN    cholecalciferol (VITAMIN D3) (50,000 UNITS /1250 MCG) capsule TAKE ONE CAPSULE BY MOUTH EACH WEEK    lisinopril-hydroCHLOROthiazide (PRINZIDE, ZESTORETIC) 10-12.5 mg per tablet Take 1 Tab by mouth daily.  cyclobenzaprine (FLEXERIL) 10 mg tablet Take 1 Tab by mouth nightly as needed for Muscle Spasm(s). Indications: muscle spasm    ergocalciferol (ERGOCALCIFEROL) 50,000 unit capsule Take 1 Cap by mouth every seven (7) days. No current facility-administered medications for this visit.           OBJECTIVE:  alert, well appearing, and in no distress  Visit Vitals  /89 (BP 1 Location: Left upper arm, BP Patient Position: Sitting, BP Cuff Size: Adult)   Pulse 92   Temp 97.2 °F (36.2 °C) (Temporal)   Resp 20   Ht 5' 9\" (1.753 m)   Wt 263 lb (119.3 kg)   SpO2 97%   BMI 38.84 kg/m²      well developed and well nourished  Chest - chest wall tenderness noted left upper chest to palpation  Heart - normal rate, regular rhythm, normal S1, S2, no murmurs, rubs, clicks or gallops  Extremities - peripheral pulses normal, no pedal edema, no clubbing or cyanosis  Back exam - tenderness noted paraspinal muscles in the lumbar area to palpation        Labs:   Lab Results   Component Value Date/Time    Cholesterol, total 229 (H) 08/15/2018 11:22 AM    HDL Cholesterol 37 (L) 08/15/2018 11:22 AM    LDL, calculated 165 (H) 08/15/2018 11:22 AM    Triglyceride 137 08/15/2018 11:22 AM     Lab Results   Component Value Date/Time    Sodium 138 01/25/2021 12:31 PM    Potassium 4.0 01/25/2021 12:31 PM    Chloride 106 01/25/2021 12:31 PM    CO2 31 01/25/2021 12:31 PM    Anion gap 1 (L) 01/25/2021 12:31 PM    Glucose 98 01/25/2021 12:31 PM    BUN 13 01/25/2021 12:31 PM    Creatinine 1.26 01/25/2021 12:31 PM    BUN/Creatinine ratio 10 (L) 01/25/2021 12:31 PM    GFR est AA >60 01/25/2021 12:31 PM    GFR est non-AA >60 01/25/2021 12:31 PM    Calcium 9.2 01/25/2021 12:31 PM    Bilirubin, total 0.2 01/25/2021 12:31 PM    ALT (SGPT) 39 01/25/2021 12:31 PM    Alk. phosphatase 76 01/25/2021 12:31 PM    Protein, total 7.6 01/25/2021 12:31 PM    Albumin 3.8 01/25/2021 12:31 PM    Globulin 3.8 01/25/2021 12:31 PM    A-G Ratio 1.0 01/25/2021 12:31 PM        Discussed the patient's BMI with him. The BMI follow up plan is as follows: I have counseled this patient on diet and exercise regimens. Assessment/Plan      ICD-10-CM ICD-9-CM    1. Essential hypertension  I10 401.9  improved controlled on lisinopril-hydroCHLOROthiazide (PRINZIDE, ZESTORETIC) 10-12.5 mg per tablet      LIPID PANEL      METABOLIC PANEL, COMPREHENSIVE   2. Vitamin D deficiency  E55.9 268.9  status unknown patient now on cholecalciferol (VITAMIN D3) (50,000 UNITS /1250 MCG) capsule per week      VITAMIN D, 25 HYDROXY   3. Class 2 severe obesity due to excess calories with serious comorbidity and body mass index (BMI) of 38.0 to 38.9 in adult [facility])  E66.01 278.01  patient will work on trying to lose weight by cutting back starches and sweets in his diet    Z68.38 V85.38    4. Intercostal muscle strain, initial encounter  S29.011A 848.3  will treat with cyclobenzaprine (FLEXERIL) 10 mg tablet and refer to chiropractor   5. Back strain, initial encounter  S39.012A 847.9  will treat with cyclobenzaprine (FLEXERIL) 10 mg tablet referred to chiropractor   6. Recurrent major depressive disorder, in partial remission [facility])  F33.41 296.35  patient to follow-up with a new therapist     Follow-up and Dispositions    · Return in about 4 weeks (around 3/3/2021) for labs 1 week before. Reviewed plan of care. Patient has provided input and agrees with goals.

## 2021-03-25 ENCOUNTER — TELEPHONE (OUTPATIENT)
Dept: INTERNAL MEDICINE CLINIC | Age: 36
End: 2021-03-25

## 2021-03-25 NOTE — TELEPHONE ENCOUNTER
Pt calling asking for same day appointment. Says he thinks he is having allergy issues. He works for [employer] and they say he needs to be tested. Wants to know what Dr. Jeison Avery wants him to do?

## 2021-03-27 LAB — SARS-COV-2, NAA: NOT DETECTED

## 2021-04-09 ENCOUNTER — OFFICE VISIT (OUTPATIENT)
Dept: INTERNAL MEDICINE CLINIC | Age: 36
End: 2021-04-09
Payer: COMMERCIAL

## 2021-04-09 VITALS
WEIGHT: 262 LBS | RESPIRATION RATE: 16 BRPM | HEIGHT: 69 IN | BODY MASS INDEX: 38.8 KG/M2 | HEART RATE: 84 BPM | SYSTOLIC BLOOD PRESSURE: 134 MMHG | DIASTOLIC BLOOD PRESSURE: 90 MMHG | OXYGEN SATURATION: 98 % | TEMPERATURE: 97.1 F

## 2021-04-09 DIAGNOSIS — J30.89 ENVIRONMENTAL AND SEASONAL ALLERGIES: Primary | ICD-10-CM

## 2021-04-09 PROCEDURE — 99213 OFFICE O/P EST LOW 20 MIN: CPT | Performed by: NURSE PRACTITIONER

## 2021-04-09 RX ORDER — LEVOCETIRIZINE DIHYDROCHLORIDE 5 MG/1
TABLET, FILM COATED ORAL
COMMUNITY

## 2021-04-09 RX ORDER — MONTELUKAST SODIUM 10 MG/1
10 TABLET ORAL DAILY
Qty: 30 TAB | Refills: 0 | Status: SHIPPED | OUTPATIENT
Start: 2021-04-09 | End: 2021-05-12 | Stop reason: SDUPTHER

## 2021-04-09 RX ORDER — SODIUM CHLORIDE 0.65 %
1 AEROSOL, SPRAY (ML) NASAL AS NEEDED
Qty: 30 ML | Refills: 0
Start: 2021-04-09 | End: 2022-05-25

## 2021-04-09 RX ORDER — FLUTICASONE PROPIONATE 50 MCG
2 SPRAY, SUSPENSION (ML) NASAL
Qty: 1 BOTTLE | Refills: 0
Start: 2021-04-09 | End: 2022-03-14 | Stop reason: SDUPTHER

## 2021-04-09 NOTE — PROGRESS NOTES
[facility] [address] [place] [address] [facility]  [phone] YDN/[phone] fax        4/9/2021    Patient Eyad Morin 1985     Primary MD Ela Beaulieu MD    Subjective    Eyad Morin a 37 y.o. male who presents with a sick visit for   Chief Complaint   Patient presents with    Nasal Discharge     pt c/o stuffy nose especially at night, causing himdifficulty breathing    Headache     sinus headache    Sinus Pain     pt c/o sinus drainage in throat     Patient was allergy tested some time back and it was suggested that he do allergy injections. He has not started these as he is still considering it. He was told that he was allergic to \"everything. \" Today he complains of clear nasal drainage and postnasal drip. He is taking xyzal. He denies shortness of breath, chest pain, fatigue, GI/ issues. He denies sinus pressure but does suffer with a sinus headache at times. He believes his allergies are the cause of all of his symptoms. He is not taking any over-the-counter medications for his allergies.       Past Medical History:   Diagnosis Date    Condyloma acuminatum     Environmental allergies     Groin pain     Obstructive sleep apnea on CPAP     CATARINA (obstructive sleep apnea)     Plantar fasciitis     Sinusitis     Tinea pedis        Past Surgical History:   Procedure Laterality Date    HX OTHER SURGICAL      dental       Social History     Socioeconomic History    Marital status:      Spouse name: Not on file    Number of children: Not on file    Years of education: Not on file    Highest education level: Not on file   Occupational History    Not on file   Social Needs    Financial resource strain: Not on file    Food insecurity     Worry: Not on file     Inability: Not on file    Transportation needs     Medical: Not on file     Non-medical: Not on file   Tobacco Use    Smoking status: Never Smoker    Smokeless tobacco: Never Used   Substance and Sexual Activity    Alcohol use: No    Drug use: No    Sexual activity: Not on file   Lifestyle    Physical activity     Days per week: Not on file     Minutes per session: Not on file    Stress: Not on file   Relationships    Social connections     Talks on phone: Not on file     Gets together: Not on file     Attends Judaism service: Not on file     Active member of club or organization: Not on file     Attends meetings of clubs or organizations: Not on file     Relationship status: Not on file    Intimate partner violence     Fear of current or ex partner: Not on file     Emotionally abused: Not on file     Physically abused: Not on file     Forced sexual activity: Not on file   Other Topics Concern    Not on file   Social History Narrative    Not on file       Family History   Problem Relation Age of Onset    Hypertension Mother     Diabetes Mother     Hypertension Father        Current Outpatient Medications on File Prior to Visit   Medication Sig Dispense Refill    levocetirizine (Xyzal) 5 mg tablet Take  by mouth.  clobetasoL (TEMOVATE) 0.05 % ointment APPLY SPARINGLY TO AFFECTED AREA(S) TWICE DAILY DO NOT USE ON FACE OR GROIN      cholecalciferol (VITAMIN D3) (50,000 UNITS /1250 MCG) capsule TAKE ONE CAPSULE BY MOUTH EACH WEEK      lisinopril-hydroCHLOROthiazide (PRINZIDE, ZESTORETIC) 10-12.5 mg per tablet Take 1 Tab by mouth daily. 90 Tab 1    cyclobenzaprine (FLEXERIL) 10 mg tablet Take 1 Tab by mouth nightly as needed for Muscle Spasm(s). Indications: muscle spasm 30 Tab 1    ergocalciferol (ERGOCALCIFEROL) 50,000 unit capsule Take 1 Cap by mouth every seven (7) days. 12 Cap 3     No current facility-administered medications on file prior to visit.         Objective    Visit Vitals  BP (!) 134/90   Pulse 84   Temp 97.1 °F (36.2 °C) (Temporal)   Resp 16   Ht 5' 9\" (1.753 m)   Wt 262 lb (118.8 kg)   SpO2 98%   BMI 38.69 kg/m²       Physical Exam  Vitals signs and nursing note reviewed. Constitutional:       Appearance: Normal appearance. HENT:      Head: Normocephalic and atraumatic. Right Ear: Tympanic membrane and ear canal normal.      Left Ear: Tympanic membrane and ear canal normal.      Nose: Congestion and rhinorrhea present. Mouth/Throat:      Mouth: Mucous membranes are moist.      Pharynx: Oropharynx is clear. No pharyngeal swelling or posterior oropharyngeal erythema. Comments: Postnasal drip noted. Eyes:      Extraocular Movements: Extraocular movements intact. Pupils: Pupils are equal, round, and reactive to light. Neck:      Musculoskeletal: Normal range of motion and neck supple. Cardiovascular:      Rate and Rhythm: Normal rate and regular rhythm. Heart sounds: Normal heart sounds. Pulmonary:      Effort: Pulmonary effort is normal. No respiratory distress. Breath sounds: Normal breath sounds. No wheezing. Musculoskeletal: Normal range of motion. Skin:     General: Skin is warm and dry. Neurological:      General: No focal deficit present. Mental Status: He is alert and oriented to person, place, and time. Psychiatric:         Mood and Affect: Mood normal.         Behavior: Behavior normal.           Assessment  1. Environmental and seasonal allergies  Patient is suffering from seasonal allergies. He was allergy tested and found to have multiple allergies but is not wanting to start the injection therapy. Did speak briefly to him in reference to this treatment. Patient is considering. Will initiate Singulair 10 mg daily x30 days. Informed patient if he finds this medication is effective for his allergies, he can contact his PCP to request refills. Instructed patient to obtain Flonase and use 2 sprays both nostrils every morning along with saline spray at nighttime to help open his sinuses.   Instructed patient to change out his Gator mask every day as this could be collecting the heavy pollen that has fallen lately. Patient is also wearing a disposable mask underneath his Gator maskinstructed him to discard this mask every day and apply a new 1 to help with second layer against pollen. Sinus action plan! Instructed pt to: Avoid the allergen (strong smells, animals, cigarettes or carpeted areas)    Start medications as soon as the first sneeze, runny nose, watery eye or tickle in your throat appears and plan to continue it EVERY DAY for the next 2-4 weeks. Antihistamine:   Nasal spray (generic) over the counter, as directed to help turn off the allergic reaction in your sinus. NSAID: Aleve - one tablet twice a day to decrease the inflammation in the sinus. Instructed patient to sit up slowly when getting up from a lying position and then wait a moment before standing. Once standing, pt needs to wait a moment before attempting to walk. This will help prevent the loss of equilibrium when the allergies are active. Patient verbalized understanding.    - fluticasone propionate (Flonase Allergy Relief) 50 mcg/actuation nasal spray; 2 Sprays by Both Nostrils route daily as needed for Rhinitis or Allergies. Dispense: 1 Bottle; Refill: 0  - montelukast (SINGULAIR) 10 mg tablet; Take 1 Tab by mouth daily. Indications: seasonal runny nose  Dispense: 30 Tab; Refill: 0  - sodium chloride (Saline Nasal Mist) 0.65 % nasal squeeze bottle; 0.05 mL by Both Nostrils route as needed for Congestion. Dispense: 30 mL; Refill: 0    Of note:  He mentioned he would get short of breath with some chest pain while running and working out. He has not mentioned this to his PCP. Strongly encourage patient to discuss this with his PCP as he may need some cardiac work-up. Patient did miss his last follow-up appointment but will schedule one today. Dr Milagro bryant.  OPAL Valderrama-ANDRE, DNP  [facility]      I spent 22 minutes with the patient in face-to-face consultation, of which greater than 50% was spent in counseling and coordination of care as described above.

## 2021-04-09 NOTE — PROGRESS NOTES
Chief Complaint   Patient presents with    Nasal Discharge     pt c/o stuffy nose especially at night, causing himdifficulty breathing    Headache     sinus headache    Sinus Pain     pt c/o sinus drainage in throat       1. Have you been to the ER, urgent care clinic since your last visit? Hospitalized since your last visit? No    2. Have you seen or consulted any other health care providers outside of the [address] [place] [address] since your last visit? Include any pap smears or colon screening.  No.

## 2021-04-14 ENCOUNTER — OFFICE VISIT (OUTPATIENT)
Dept: INTERNAL MEDICINE CLINIC | Age: 36
End: 2021-04-14
Payer: COMMERCIAL

## 2021-04-14 VITALS
SYSTOLIC BLOOD PRESSURE: 142 MMHG | HEIGHT: 69 IN | OXYGEN SATURATION: 98 % | DIASTOLIC BLOOD PRESSURE: 98 MMHG | BODY MASS INDEX: 38.06 KG/M2 | HEART RATE: 73 BPM | RESPIRATION RATE: 20 BRPM | TEMPERATURE: 97.7 F | WEIGHT: 257 LBS

## 2021-04-14 DIAGNOSIS — I10 ESSENTIAL HYPERTENSION: Primary | ICD-10-CM

## 2021-04-14 DIAGNOSIS — M25.552 LEFT HIP PAIN: ICD-10-CM

## 2021-04-14 DIAGNOSIS — J01.90 ACUTE NON-RECURRENT SINUSITIS, UNSPECIFIED LOCATION: ICD-10-CM

## 2021-04-14 DIAGNOSIS — R07.9 CHEST PAIN, UNSPECIFIED TYPE: ICD-10-CM

## 2021-04-14 PROCEDURE — 99214 OFFICE O/P EST MOD 30 MIN: CPT | Performed by: INTERNAL MEDICINE

## 2021-04-14 PROCEDURE — 93000 ELECTROCARDIOGRAM COMPLETE: CPT | Performed by: INTERNAL MEDICINE

## 2021-04-14 RX ORDER — AZITHROMYCIN 250 MG/1
TABLET, FILM COATED ORAL
Qty: 6 TAB | Refills: 0 | Status: SHIPPED | OUTPATIENT
Start: 2021-04-14 | End: 2021-04-19

## 2021-04-14 NOTE — PATIENT INSTRUCTIONS

## 2021-04-14 NOTE — PROGRESS NOTES
Patient is in the office today for chest pain on and off when exercising. Patient states he has not been taking BP medication. 1. Have you been to the ER, urgent care clinic since your last visit? Hospitalized since your last visit? No    2. Have you seen or consulted any other health care providers outside of the [address] [place] [address] since your last visit? Include any pap smears or colon screening.  No

## 2021-04-18 NOTE — PROGRESS NOTES
Jes Fritz is a 37 y.o.  male and presents with Chest Pain (when exercising), Allergic Rhinitis, Hypertension, and Hip Pain      SUBJECTIVE:  Pt's BP remains elevated and he has not been taking his BP medication. He has been noticing some chest discomfort with exercise he does as a . He had a EKG done today that is unchanged from when he had previously done few months ago. Denies any chest pain at rest.  Hip Pain  Patient complains of left hip pain. Onset of the symptoms was 2 months ago. Inciting event: none. Current symptoms include left hip pain. Severity = moderate. Associated symptoms: none. Aggravating symptoms: standing, walking. Patient's overall course: symptoms have progressed to a point and plateaued. . Patient has had no prior hip problems. Previous visits for this problem: none. Evaluation to date: none. Treatment to date: OTC analgesics PRN: somewhat effective. Patient's BMI is up to 38 which could be a factor in exacerbating his left hip pain. Patient encouraged to try cut back starches and sweets in his diet to lose weight. Patient has chronic allergic rhinitis which has been exacerbated recently been on Singulair and Flonase. Respiratory ROS: negative for - shortness of breath  Cardiovascular ROS: negative for - chest pain    Current Outpatient Medications   Medication Sig    azithromycin (ZITHROMAX) 250 mg tablet Take 2 tablets today, then take 1 tablet daily    fluticasone propionate (Flonase Allergy Relief) 50 mcg/actuation nasal spray 2 Sprays by Both Nostrils route daily as needed for Rhinitis or Allergies.  montelukast (SINGULAIR) 10 mg tablet Take 1 Tab by mouth daily. Indications: seasonal runny nose    clobetasoL (TEMOVATE) 0.05 % ointment APPLY SPARINGLY TO AFFECTED AREA(S) TWICE DAILY DO NOT USE ON FACE OR GROIN    levocetirizine (Xyzal) 5 mg tablet Take  by mouth.     sodium chloride (Saline Nasal Mist) 0.65 % nasal squeeze bottle 0.05 mL by Both Nostrils route as needed for Congestion.  cholecalciferol (VITAMIN D3) (50,000 UNITS /1250 MCG) capsule TAKE ONE CAPSULE BY MOUTH EACH WEEK    lisinopril-hydroCHLOROthiazide (PRINZIDE, ZESTORETIC) 10-12.5 mg per tablet Take 1 Tab by mouth daily.  cyclobenzaprine (FLEXERIL) 10 mg tablet Take 1 Tab by mouth nightly as needed for Muscle Spasm(s). Indications: muscle spasm    ergocalciferol (ERGOCALCIFEROL) 50,000 unit capsule Take 1 Cap by mouth every seven (7) days. No current facility-administered medications for this visit. OBJECTIVE:  alert, well appearing, and in no distress  Visit Vitals  BP (!) 142/98 (BP 1 Location: Right arm, BP Patient Position: Sitting, BP Cuff Size: Adult)   Pulse 73   Temp 97.7 °F (36.5 °C) (Temporal)   Resp 20   Ht 5' 9\" (1.753 m)   Wt 257 lb (116.6 kg)   SpO2 98%   BMI 37.95 kg/m²      well developed and well nourished  Chest - clear to auscultation, no wheezes, rales or rhonchi, symmetric air entry  Heart - normal rate, regular rhythm, normal S1, S2, no murmurs, rubs, clicks or gallops  Musculoskeletal - no joint tenderness, deformity or swelling          Discussed the patient's BMI with him. The BMI follow up plan is as follows: I have counseled this patient on diet and exercise regimens. Assessment/Plan      ICD-10-CM ICD-9-CM    1. Essential hypertension  I10 401.9  uncontrolled patient encouraged to take Zestoretic 10/12.5 on a daily basis   2. Chest pain, unspecified type  R07.9 786.50 AMB POC EKG ROUTINE W/ 12 LEADS, INTER & REP normal.  Chest discomfort probably related to uncontrolled high blood pressure patient will try taking his blood pressure medicines and will reassess in 4 weeks   3. Left hip pain  M25.552 719.45 REFERRAL TO ORTHOPEDICS   4.  Acute non-recurrent sinusitis, unspecified location  J01.90 461.9  will treat with azithromycin (ZITHROMAX) 250 mg tablet and patient to consider using saline rinse like a [place] pot     Follow-up and Dispositions    · Return in about 4 weeks (around 5/12/2021) for BP check. Reviewed plan of care. Patient has provided input and agrees with goals.

## 2021-06-10 LAB — SARS-COV-2, NAA: NOT DETECTED

## 2021-06-17 ENCOUNTER — APPOINTMENT (OUTPATIENT)
Dept: GENERAL RADIOLOGY | Age: 36
End: 2021-06-17
Attending: EMERGENCY MEDICINE
Payer: COMMERCIAL

## 2021-06-17 PROCEDURE — 73552 X-RAY EXAM OF FEMUR 2/>: CPT

## 2021-06-17 PROCEDURE — 99283 EMERGENCY DEPT VISIT LOW MDM: CPT

## 2021-06-18 ENCOUNTER — HOSPITAL ENCOUNTER (EMERGENCY)
Age: 36
Discharge: HOME | End: 2021-06-18
Attending: EMERGENCY MEDICINE
Payer: COMMERCIAL

## 2021-06-18 VITALS
SYSTOLIC BLOOD PRESSURE: 129 MMHG | DIASTOLIC BLOOD PRESSURE: 77 MMHG | BODY MASS INDEX: 35.79 KG/M2 | TEMPERATURE: 98 F | WEIGHT: 250 LBS | OXYGEN SATURATION: 99 % | HEART RATE: 79 BPM | HEIGHT: 70 IN | RESPIRATION RATE: 16 BRPM

## 2021-06-18 DIAGNOSIS — M54.30 ACUTE SCIATICA: Primary | ICD-10-CM

## 2021-06-18 DIAGNOSIS — G57.02 PIRIFORMIS SYNDROME OF LEFT SIDE: ICD-10-CM

## 2021-06-18 PROCEDURE — 74011636637 HC RX REV CODE- 636/637: Performed by: EMERGENCY MEDICINE

## 2021-06-18 RX ORDER — PREDNISONE 20 MG/1
40 TABLET ORAL
Status: COMPLETED | OUTPATIENT
Start: 2021-06-18 | End: 2021-06-18

## 2021-06-18 RX ORDER — MONTELUKAST SODIUM 10 MG/1
10 TABLET ORAL DAILY
Qty: 10 TABLET | Refills: 0 | Status: SHIPPED | OUTPATIENT
Start: 2021-06-18 | End: 2021-06-21 | Stop reason: SDUPTHER

## 2021-06-18 RX ORDER — HYDROCODONE BITARTRATE AND ACETAMINOPHEN 5; 325 MG/1; MG/1
1 TABLET ORAL
Qty: 10 TABLET | Refills: 0 | Status: SHIPPED | OUTPATIENT
Start: 2021-06-18 | End: 2021-06-21

## 2021-06-18 RX ORDER — METHYLPREDNISOLONE 4 MG/1
TABLET ORAL
Qty: 1 DOSE PACK | Refills: 0 | Status: SHIPPED | OUTPATIENT
Start: 2021-06-18 | End: 2021-09-09

## 2021-06-18 RX ADMIN — PREDNISONE 40 MG: 20 TABLET ORAL at 03:16

## 2021-06-18 NOTE — ED PROVIDER NOTES
EMERGENCY DEPARTMENT HISTORY AND PHYSICAL EXAM    3:10 AM    Date: 6/18/2021  Patient Name: Ailin Curtis    History of Presenting Illness     Chief Complaint   Patient presents with    Hip Pain       History Provided By: Patient  Location/Duration/Severity/Modifying factors   53-year-old male presenting with left-sided posterior hip pain rating to the left posterior thigh going on for 3 months. He tried muscle relaxer anti-inflammatory and other remedies without results. He does not have any numbness or tingling in the groin area no difficulty with urination retention or incontinence. Worsened with hip flexion and ambulation. Patient denies any injury but believes maybe he could have injured it as he works as a  for physical education. He is very physically active at school year distended. He denies any incontinence of urine or difficulty urinating, no urinary retention saddle anesthesia. Patient also notes that he has had increased drainage in his throat which he has had on prior occasions related to allergies, he reports that he previously took Singulair that worked well for him but was not able to get this refilled would like that refilled. PCP: Pati Sweet MD    Current Facility-Administered Medications   Medication Dose Route Frequency Provider Last Rate Last Admin    predniSONE (DELTASONE) tablet 40 mg  40 mg Oral NOW Wing Ready, DO         Current Outpatient Medications   Medication Sig Dispense Refill    OTHER Men's multivitamin      OTHER Iron 18mg po daily      levocetirizine (Xyzal) 5 mg tablet Take  by mouth.  fluticasone propionate (Flonase Allergy Relief) 50 mcg/actuation nasal spray 2 Sprays by Both Nostrils route daily as needed for Rhinitis or Allergies. 1 Bottle 0    lisinopril-hydroCHLOROthiazide (PRINZIDE, ZESTORETIC) 10-12.5 mg per tablet Take 1 Tab by mouth daily.  90 Tab 1    multivitamin, tx-iron-ca-min (THERA-M w/ IRON) 9 mg iron-400 mcg tab tablet Take 1 Tablet by mouth daily.  montelukast (Singulair) 10 mg tablet Take 1 Tab by mouth daily. (Patient not taking: Reported on 6/17/2021) 90 Tab 1    sodium chloride (Saline Nasal Mist) 0.65 % nasal squeeze bottle 0.05 mL by Both Nostrils route as needed for Congestion. (Patient not taking: Reported on 6/17/2021) 30 mL 0    clobetasoL (TEMOVATE) 0.05 % ointment APPLY SPARINGLY TO AFFECTED AREA(S) TWICE DAILY DO NOT USE ON FACE OR GROIN (Patient not taking: Reported on 6/17/2021)      cholecalciferol (VITAMIN D3) (50,000 UNITS /1250 MCG) capsule TAKE ONE CAPSULE BY MOUTH EACH WEEK (Patient not taking: Reported on 6/17/2021)      cyclobenzaprine (FLEXERIL) 10 mg tablet Take 1 Tab by mouth nightly as needed for Muscle Spasm(s). Indications: muscle spasm (Patient not taking: Reported on 6/17/2021) 30 Tab 1    ergocalciferol (ERGOCALCIFEROL) 50,000 unit capsule Take 1 Cap by mouth every seven (7) days. (Patient not taking: Reported on 6/17/2021) 12 Cap 3       Past History     Past Medical History:  Past Medical History:   Diagnosis Date    Anemia     Condyloma acuminatum     Environmental allergies     Groin pain     Obstructive sleep apnea on CPAP     CATARINA (obstructive sleep apnea)     Plantar fasciitis     Sinusitis     Tinea pedis        Past Surgical History:  Past Surgical History:   Procedure Laterality Date    HX OTHER SURGICAL      dental       Family History:  Family History   Problem Relation Age of Onset    Hypertension Mother     Diabetes Mother     Hypertension Father        Social History:  Social History     Tobacco Use    Smoking status: Never Smoker    Smokeless tobacco: Never Used   Substance Use Topics    Alcohol use: No    Drug use: No       Allergies:   Allergies   Allergen Reactions    Penicillins Rash, Swelling and Hives    Mold Extracts Rash    Peanut Rash and Swelling       I reviewed and confirmed the above information with patient and updated as necessary. Review of Systems     Review of Systems   Constitutional: Negative for fever. HENT: Positive for congestion. Negative for rhinorrhea. Nasal and throat drainage     Eyes: Negative for visual disturbance. Respiratory: Negative for cough and shortness of breath. Cardiovascular: Negative for chest pain. Gastrointestinal: Negative for abdominal pain, diarrhea, nausea and vomiting. Genitourinary: Negative for urgency. Musculoskeletal: Positive for back pain (L low back and hip). Negative for myalgias. Skin: Negative for rash. Neurological: Negative for headaches. Physical Exam     Visit Vitals  /80 (BP 1 Location: Left upper arm, BP Patient Position: At rest)   Pulse 86   Temp 98.3 °F (36.8 °C)   Resp 18   Ht 5' 10\" (1.778 m)   Wt 113.4 kg (250 lb)   SpO2 98%   BMI 35.87 kg/m²       Physical Exam  Constitutional:       General: He is not in acute distress. Appearance: Normal appearance. He is normal weight. He is not ill-appearing or toxic-appearing. HENT:      Head: Normocephalic and atraumatic. Right Ear: External ear normal.      Left Ear: External ear normal.      Nose: Nose normal.      Mouth/Throat:      Mouth: Mucous membranes are moist.      Pharynx: No oropharyngeal exudate or posterior oropharyngeal erythema. Comments: Mild posterior pharyngeal erythema, no exudate, slight post nasal drainage  Eyes:      Conjunctiva/sclera: Conjunctivae normal.      Pupils: Pupils are equal, round, and reactive to light. Cardiovascular:      Rate and Rhythm: Normal rate and regular rhythm. Pulses: Normal pulses. Heart sounds: Normal heart sounds. No murmur heard. No friction rub. Pulmonary:      Effort: Pulmonary effort is normal.      Breath sounds: Normal breath sounds. No wheezing, rhonchi or rales. Abdominal:      General: Abdomen is flat. Tenderness: There is no abdominal tenderness. There is no guarding or rebound. Musculoskeletal:         General: No swelling or tenderness. Normal range of motion. Cervical back: Normal range of motion and neck supple. Right lower leg: No edema. Left lower leg: No edema. Comments: +TTP in the mid lateral right gluteal upper mm and over piriformis musculature. Symmetric strength in both LEs   Skin:     General: Skin is warm and dry. Capillary Refill: Capillary refill takes less than 2 seconds. Neurological:      General: No focal deficit present. Mental Status: He is alert. Motor: No weakness. Diagnostic Study Results     Labs -  No results found for this or any previous visit (from the past 24 hour(s)). Radiologic Studies -   XR FEMUR LT 2 V    (Results Pending)           Medical Decision Making   I am the first provider for this patient. I reviewed the vital signs, available nursing notes, past medical history, past surgical history, family history and social history. Vital Signs-Reviewed the patient's vital signs. EKG: N/A    Records Reviewed: Nursing Notes, Old Medical Records, Previous Radiology Studies and Previous Laboratory Studies (Time of Review: 3:10 AM)    ED Course: Progress Notes, Reevaluation, and Consults:  ED Course as of Jun 18 0310 Fri Jun 18, 2021   0238 X-ray of the left femur reveals no fracture or dislocation. [INGRID]      ED Course User Index  [INGRID] Codie Mckenzie DO         Provider Notes (Medical Decision Making):   MDM  Number of Diagnoses or Management Options  Acute sciatica  Piriformis syndrome of left side  Diagnosis management comments: 79-year-old male presenting with right-sided hip pain, located primarily in the right posterior and lateral hip, over the piriformis and gluteal musculature. Worsens with ambulation it seems to be consistent with sciatica, has been dealing with intermittently for 3 months. No red flags for cauda equina, conus medullaris or more worrisome pathology.   He would also like refill of his Singulair. We will refill his Singulair for the time being, will do a course of Medrol Dosepak and pain control and have him follow-up with orthopedics and or his PCP. At this time, patient is stable and appropriate for discharge home.  Patient demonstrates understanding of current diagnoses and is in agreement with the treatment plan. Chan Mendoza are advised that while the likelihood of serious underlying condition is low at this point given the evaluation performed today, we cannot fully rule it out. Chan Mendoza are advised to immediately return with any new symptoms or worsening of current condition.  All questions have been answered. Pema Andre is given educational material regarding their diagnoses, including danger symptoms and when to return to the ED. This note was dictated utilizing Dragon voice recognition software. Unfortunately this leads to occasional typographical errors. I apologize in advance if the situation occurs. If questions occur please do not hesitate to contact me directly. Yin Rene, DO          Procedures    Critical Care Time: N/A    Diagnosis     Clinical Impression: No diagnosis found. Disposition: Discharge    Follow-up Information    None          Patient's Medications   Start Taking    No medications on file   Continue Taking    CHOLECALCIFEROL (VITAMIN D3) (50,000 UNITS /1250 MCG) CAPSULE    TAKE ONE CAPSULE BY MOUTH EACH WEEK    CLOBETASOL (TEMOVATE) 0.05 % OINTMENT    APPLY SPARINGLY TO AFFECTED AREA(S) TWICE DAILY DO NOT USE ON FACE OR GROIN    CYCLOBENZAPRINE (FLEXERIL) 10 MG TABLET    Take 1 Tab by mouth nightly as needed for Muscle Spasm(s). Indications: muscle spasm    ERGOCALCIFEROL (ERGOCALCIFEROL) 50,000 UNIT CAPSULE    Take 1 Cap by mouth every seven (7) days. FLUTICASONE PROPIONATE (FLONASE ALLERGY RELIEF) 50 MCG/ACTUATION NASAL SPRAY    2 Sprays by Both Nostrils route daily as needed for Rhinitis or Allergies.     LEVOCETIRIZINE (XYZAL) 5 MG TABLET Take  by mouth. LISINOPRIL-HYDROCHLOROTHIAZIDE (PRINZIDE, ZESTORETIC) 10-12.5 MG PER TABLET    Take 1 Tab by mouth daily. MONTELUKAST (SINGULAIR) 10 MG TABLET    Take 1 Tab by mouth daily. MULTIVITAMIN, TX-IRON-CA-MIN (THERA-M W/ IRON) 9 MG IRON-400 MCG TAB TABLET    Take 1 Tablet by mouth daily. OTHER    Men's multivitamin    OTHER    Iron 18mg po daily    SODIUM CHLORIDE (SALINE NASAL MIST) 0.65 % NASAL SQUEEZE BOTTLE    0.05 mL by Both Nostrils route as needed for Congestion. These Medications have changed    No medications on file   Stop Taking    No medications on file       Robert Larose DO   Emergency Medicine   June 18, 2021, 3:10 AM     This note is dictated utilizing Dragon voice recognition software. Unfortunately this leads to occasional typographical errors using the voice recognition. I apologize in advance if the situation occurs. If questions occur please do not hesitate to contact me directly.     Robert Larose, DO

## 2021-06-18 NOTE — ED TRIAGE NOTES
C/o left hip pain for months, worsening  Radiation to upper left hip and lower thigh  Patient states he doesn't feel right, further clarification he states he has throat drainage and couldn't eat dinner.

## 2021-06-21 ENCOUNTER — TELEPHONE (OUTPATIENT)
Dept: INTERNAL MEDICINE CLINIC | Age: 36
End: 2021-06-21

## 2021-06-21 DIAGNOSIS — M54.42 ACUTE LEFT-SIDED LOW BACK PAIN WITH LEFT-SIDED SCIATICA: Primary | ICD-10-CM

## 2021-06-21 RX ORDER — MONTELUKAST SODIUM 10 MG/1
10 TABLET ORAL DAILY
Qty: 90 TABLET | Refills: 1 | Status: SHIPPED | OUTPATIENT
Start: 2021-06-21 | End: 2022-03-01

## 2021-06-21 NOTE — TELEPHONE ENCOUNTER
Pt was seen at [facility] on 06/18 he was told to f/up with orthopedic he said his insurance does not need a referral ,but he was told  By clinic he needs one in order to get scheduled he would like to go to  [address] [place] [address] and [facility]ine Specialist

## 2021-06-29 ENCOUNTER — OFFICE VISIT (OUTPATIENT)
Dept: INTERNAL MEDICINE CLINIC | Age: 36
End: 2021-06-29
Payer: COMMERCIAL

## 2021-06-29 VITALS
TEMPERATURE: 98.6 F | SYSTOLIC BLOOD PRESSURE: 130 MMHG | BODY MASS INDEX: 36.51 KG/M2 | HEART RATE: 96 BPM | HEIGHT: 70 IN | WEIGHT: 255 LBS | OXYGEN SATURATION: 98 % | DIASTOLIC BLOOD PRESSURE: 84 MMHG | RESPIRATION RATE: 18 BRPM

## 2021-06-29 DIAGNOSIS — F41.9 ANXIETY: ICD-10-CM

## 2021-06-29 DIAGNOSIS — D50.8 IRON DEFICIENCY ANEMIA SECONDARY TO INADEQUATE DIETARY IRON INTAKE: Primary | ICD-10-CM

## 2021-06-29 DIAGNOSIS — E66.01 CLASS 2 SEVERE OBESITY DUE TO EXCESS CALORIES WITH SERIOUS COMORBIDITY AND BODY MASS INDEX (BMI) OF 36.0 TO 36.9 IN ADULT (HCC): ICD-10-CM

## 2021-06-29 DIAGNOSIS — I10 ESSENTIAL HYPERTENSION: ICD-10-CM

## 2021-06-29 DIAGNOSIS — M25.552 LEFT HIP PAIN: ICD-10-CM

## 2021-06-29 PROCEDURE — 99214 OFFICE O/P EST MOD 30 MIN: CPT | Performed by: INTERNAL MEDICINE

## 2021-06-29 RX ORDER — IBUPROFEN 800 MG/1
TABLET ORAL
COMMUNITY
End: 2022-07-08 | Stop reason: SDUPTHER

## 2021-06-29 NOTE — PATIENT INSTRUCTIONS
Hip Pain: Care Instructions  Your Care Instructions     Hip pain may be caused by many things, including overuse, a fall, or a twisting movement. Another cause of hip pain is arthritis. Your pain may increase when you stand up, walk, or squat. The pain may come and go or may be constant. Home treatment can help relieve hip pain, swelling, and stiffness. If your pain is ongoing, you may need more tests and treatment. Follow-up care is a key part of your treatment and safety. Be sure to make and go to all appointments, and call your doctor if you are having problems. It's also a good idea to know your test results and keep a list of the medicines you take. How can you care for yourself at home? · Take pain medicines exactly as directed. ? If the doctor gave you a prescription medicine for pain, take it as prescribed. ? If you are not taking a prescription pain medicine, ask your doctor if you can take an over-the-counter medicine. · Rest and protect your hip. Take a break from any activity, including standing or walking, that may cause pain. · Put ice or a cold pack against your hip for 10 to 20 minutes at a time. Try to do this every 1 to 2 hours for the next 3 days (when you are awake) or until the swelling goes down. Put a thin cloth between the ice and your skin. · Sleep on your healthy side with a pillow between your knees, or sleep on your back with pillows under your knees. · If there is no swelling, you can put moist heat, a heating pad, or a warm cloth on your hip. Do gentle stretching exercises to help keep your hip flexible. · Learn how to prevent falls. Have your vision and hearing checked regularly. Wear slippers or shoes with a nonskid sole. · Stay at a healthy weight. · Wear comfortable shoes. When should you call for help? Call 911 anytime you think you may need emergency care.  For example, call if:    · You have sudden chest pain and shortness of breath, or you cough up blood.     · You are not able to stand or walk or bear weight.     · Your buttocks, legs, or feet feel numb or tingly.     · Your leg or foot is cool or pale or changes color.     · You have severe pain. Call your doctor now or seek immediate medical care if:    · You have signs of infection, such as:  ? Increased pain, swelling, warmth, or redness in the hip area. ? Red streaks leading from the hip area. ? Pus draining from the hip area. ? A fever.     · You have signs of a blood clot, such as:  ? Pain in your calf, back of the knee, thigh, or groin. ? Redness and swelling in your leg or groin.     · You are not able to bend, straighten, or move your leg normally.     · You have trouble urinating or having bowel movements. Watch closely for changes in your health, and be sure to contact your doctor if:    · You do not get better as expected. Where can you learn more? Go to http://www.gray.com/  Enter Z720 in the search box to learn more about \"Hip Pain: Care Instructions. \"  Current as of: February 26, 2020               Content Version: 12.8  © 7287-7150 [employer]. Care instructions adapted under license by [employer] (which disclaims liability or warranty for this information). If you have questions about a medical condition or this instruction, always ask your healthcare professional. [place] [address] any warranty or liability for your use of this information.

## 2021-06-29 NOTE — PROGRESS NOTES
Patient is in the office today for a 4 week  follow up. 1. Have you been to the ER, urgent care clinic since your last visit? Hospitalized since your last visit? Yes. [facility].     2. Have you seen or consulted any other health care providers outside of the [address] [place] [address] since your last visit? Include any pap smears or colon screening. yes, Dr. Anahi Domínguez at [facility].

## 2021-06-30 LAB
A-G RATIO,AGRAT: 1.6 RATIO (ref 1.1–2.6)
ABSOLUTE LYMPHOCYTE COUNT, 10803: 3.9 K/UL (ref 1–4.8)
ALBUMIN SERPL-MCNC: 4.6 G/DL (ref 3.5–5)
ALP SERPL-CCNC: 84 U/L (ref 25–115)
ALT SERPL-CCNC: 38 U/L (ref 5–40)
ANION GAP SERPL CALC-SCNC: 13 MMOL/L (ref 3–15)
AST SERPL W P-5'-P-CCNC: 23 U/L (ref 10–37)
BASOPHILS # BLD: 0.1 K/UL (ref 0–0.2)
BASOPHILS NFR BLD: 1 % (ref 0–2)
BILIRUB SERPL-MCNC: 0.1 MG/DL (ref 0.2–1.2)
BUN SERPL-MCNC: 15 MG/DL (ref 6–22)
CALCIUM SERPL-MCNC: 9.9 MG/DL (ref 8.4–10.5)
CHLORIDE SERPL-SCNC: 95 MMOL/L (ref 98–110)
CO2 SERPL-SCNC: 28 MMOL/L (ref 20–32)
CREAT SERPL-MCNC: 1.4 MG/DL (ref 0.5–1.2)
EOSINOPHIL # BLD: 0.1 K/UL (ref 0–0.5)
EOSINOPHIL NFR BLD: 1 % (ref 0–6)
ERYTHROCYTE [DISTWIDTH] IN BLOOD BY AUTOMATED COUNT: 15.7 % (ref 10–15.5)
GFRAA, 66117: >60
GFRNA, 66118: 57
GLOBULIN,GLOB: 2.9 G/DL (ref 2–4)
GLUCOSE SERPL-MCNC: 89 MG/DL (ref 70–99)
GRANULOCYTES,GRANS: 39 % (ref 40–75)
HCT VFR BLD AUTO: 46.8 % (ref 36.6–51.9)
HGB BLD-MCNC: 14.6 G/DL (ref 13.2–17.3)
LYMPHOCYTES, LYMLT: 47 % (ref 20–45)
MCH RBC QN AUTO: 31 PG (ref 26–34)
MCHC RBC AUTO-ENTMCNC: 31 G/DL (ref 31–36)
MCV RBC AUTO: 100 FL (ref 80–95)
MONOCYTES # BLD: 0.9 K/UL (ref 0.1–1)
MONOCYTES NFR BLD: 11 % (ref 3–12)
NEUTROPHILS # BLD AUTO: 3.3 K/UL (ref 1.8–7.7)
PLATELET # BLD AUTO: 240 K/UL (ref 140–440)
PMV BLD AUTO: 9.5 FL (ref 9–13)
POTASSIUM SERPL-SCNC: 5.1 MMOL/L (ref 3.5–5.5)
PROT SERPL-MCNC: 7.5 G/DL (ref 6.4–8.3)
RBC # BLD AUTO: 4.67 M/UL (ref 3.8–5.8)
SODIUM SERPL-SCNC: 136 MMOL/L (ref 133–145)
WBC # BLD AUTO: 8.2 K/UL (ref 4–11)

## 2021-07-03 NOTE — PROGRESS NOTES
Wilmer Mathews is a 37 y.o.  male and presents with Hip Pain (Left hip  f/u with ortho ), Anemia (PF at [address] ), Hypertension, and Anxiety (pt to f/u with therapy )      SUBJECTIVE:    [facility] and will follow up with orthopedics for further management. Incidentally he tells me that they told him he had anemia. I have requested the records and will try to confirm. He on his own started taking iron supplements which I advised him to stop since if he is truly anemic would need to know why he is anemic at age 37. Patient's blood pressure is controlled on Zestoretic 10/12.5 daily. Patient is working on trying to lose weight by cutting back starches and sweets in his diet. This is complicated by him going through. Of stress and anxiety trying to find a career. He will consider seeing another therapist since the one he was seeing  closed. Respiratory ROS: negative for - shortness of breath  Cardiovascular ROS: negative for - chest pain    Current Outpatient Medications   Medication Sig    OTHER Take 6 Capsules by mouth daily. Grassfed Beef Liver    OTHER 3 UNSPECIFIED. Organic Men's Multi vit 3 gummies seferino    ibuprofen (MOTRIN) 800 mg tablet Take  by mouth.  montelukast (Singulair) 10 mg tablet Take 1 Tablet by mouth daily.  OTHER Men's multivitamin    OTHER Iron 18mg po daily    levocetirizine (Xyzal) 5 mg tablet Take  by mouth.  fluticasone propionate (Flonase Allergy Relief) 50 mcg/actuation nasal spray 2 Sprays by Both Nostrils route daily as needed for Rhinitis or Allergies.  lisinopril-hydroCHLOROthiazide (PRINZIDE, ZESTORETIC) 10-12.5 mg per tablet Take 1 Tab by mouth daily.  methylPREDNISolone (Medrol, Dilip,) 4 mg tablet Take per dose pack instructions (Patient not taking: Reported on 6/29/2021)    multivitamin, tx-iron-ca-min (THERA-M w/ IRON) 9 mg iron-400 mcg tab tablet Take 1 Tablet by mouth daily.  (Patient not taking: Reported on 6/29/2021)    sodium chloride (Saline Nasal Mist) 0.65 % nasal squeeze bottle 0.05 mL by Both Nostrils route as needed for Congestion. (Patient not taking: Reported on 6/17/2021)    clobetasoL (TEMOVATE) 0.05 % ointment APPLY SPARINGLY TO AFFECTED AREA(S) TWICE DAILY DO NOT USE ON FACE OR GROIN (Patient not taking: Reported on 6/17/2021)    cholecalciferol (VITAMIN D3) (50,000 UNITS /1250 MCG) capsule TAKE ONE CAPSULE BY MOUTH EACH WEEK (Patient not taking: Reported on 6/17/2021)    cyclobenzaprine (FLEXERIL) 10 mg tablet Take 1 Tab by mouth nightly as needed for Muscle Spasm(s). Indications: muscle spasm (Patient not taking: Reported on 6/17/2021)    ergocalciferol (ERGOCALCIFEROL) 50,000 unit capsule Take 1 Cap by mouth every seven (7) days. (Patient not taking: Reported on 6/17/2021)     No current facility-administered medications for this visit.          OBJECTIVE:  alert, well appearing, and in no distress  Visit Vitals  /84 (BP 1 Location: Left arm, BP Patient Position: Sitting, BP Cuff Size: Adult)   Pulse 96   Temp 98.6 °F (37 °C) (Temporal)   Resp 18   Ht 5' 10\" (1.778 m)   Wt 255 lb (115.7 kg)   SpO2 98%   BMI 36.59 kg/m²      well developed and well nourished          Labs:   Lab Results   Component Value Date/Time    WBC 8.2 06/29/2021 01:51 PM    HGB 14.6 06/29/2021 01:51 PM    HCT 46.8 06/29/2021 01:51 PM    PLATELET 692 86/44/8739 01:51 PM     (H) 06/29/2021 01:51 PM     Lab Results   Component Value Date/Time    Cholesterol, total 229 (H) 08/15/2018 11:22 AM    HDL Cholesterol 37 (L) 08/15/2018 11:22 AM    LDL, calculated 165 (H) 08/15/2018 11:22 AM    Triglyceride 137 08/15/2018 11:22 AM     Lab Results   Component Value Date/Time    Sodium 136 06/29/2021 01:51 PM    Potassium 5.1 06/29/2021 01:51 PM    Chloride 95 (L) 06/29/2021 01:51 PM    CO2 28 06/29/2021 01:51 PM    Anion gap 13.0 06/29/2021 01:51 PM    Glucose 89 06/29/2021 01:51 PM    BUN 15 06/29/2021 01:51 PM Creatinine 1.4 (H) 06/29/2021 01:51 PM    BUN/Creatinine ratio 10 (L) 01/25/2021 12:31 PM    GFR est AA >60 01/25/2021 12:31 PM    GFR est non-AA >60 01/25/2021 12:31 PM    Calcium 9.9 06/29/2021 01:51 PM    Bilirubin, total 0.1 (L) 06/29/2021 01:51 PM    ALT (SGPT) 38 06/29/2021 01:51 PM    Alk. phosphatase 84 06/29/2021 01:51 PM    Protein, total 7.5 06/29/2021 01:51 PM    Albumin 4.6 06/29/2021 01:51 PM    Globulin 2.9 06/29/2021 01:51 PM    A-G Ratio 1.6 06/29/2021 01:51 PM          Assessment/Plan      ICD-10-CM ICD-9-CM    1. Iron deficiency anemia secondary to inadequate dietary iron intake  D50.8 280.1  will [facility]  Will stop current iron supplementation and check CBC WITH AUTOMATED DIFF      CBC WITH AUTOMATED DIFF. We will will then repeat in 6 weeks off of iron supplementation   2. Essential hypertension  I10 401.9  well-controlled on Zestoretic 45/07.3 METABOLIC PANEL, COMPREHENSIVE      METABOLIC PANEL, COMPREHENSIVE   3. Class 2 severe obesity due to excess calories with serious comorbidity and body mass index (BMI) of 36.0 to 36.9 in adult [facility])  E66.01 278.01  patient will work on trying to lose weight by cutting back starches and sweets in his diet    Z68.36 V85.36    4. Left hip pain  M25.552 719.45  patient to follow-up with orthopedics for further management. Weight loss will also help decrease pressure from his symptoms   5. Anxiety  F41.9 300.00  patient will follow up with a new therapist.     Follow-up and Dispositions    · Return in about 6 weeks (around 8/10/2021) for labs 1 week before. Reviewed plan of care. Patient has provided input and agrees with goals.

## 2021-08-03 DIAGNOSIS — D50.8 IRON DEFICIENCY ANEMIA SECONDARY TO INADEQUATE DIETARY IRON INTAKE: ICD-10-CM

## 2021-08-06 ENCOUNTER — APPOINTMENT (OUTPATIENT)
Dept: INTERNAL MEDICINE CLINIC | Age: 36
End: 2021-08-06

## 2021-08-07 LAB
25(OH)D3 SERPL-MCNC: 44.4 NG/ML (ref 32–100)
A-G RATIO,AGRAT: 1.7 RATIO (ref 1.1–2.6)
ABSOLUTE LYMPHOCYTE COUNT, 10803: 3 K/UL (ref 1–4.8)
ALBUMIN SERPL-MCNC: 4.5 G/DL (ref 3.5–5)
ALP SERPL-CCNC: 76 U/L (ref 25–115)
ALT SERPL-CCNC: 29 U/L (ref 5–40)
ANION GAP SERPL CALC-SCNC: 13 MMOL/L (ref 3–15)
AST SERPL W P-5'-P-CCNC: 24 U/L (ref 10–37)
BASOPHILS # BLD: 0 K/UL (ref 0–0.2)
BASOPHILS NFR BLD: 1 % (ref 0–2)
BILIRUB SERPL-MCNC: 0.2 MG/DL (ref 0.2–1.2)
BUN SERPL-MCNC: 12 MG/DL (ref 6–22)
CALCIUM SERPL-MCNC: 9.7 MG/DL (ref 8.4–10.5)
CHLORIDE SERPL-SCNC: 99 MMOL/L (ref 98–110)
CHOLEST SERPL-MCNC: 215 MG/DL (ref 110–200)
CO2 SERPL-SCNC: 25 MMOL/L (ref 20–32)
CREAT SERPL-MCNC: 1.2 MG/DL (ref 0.5–1.2)
EOSINOPHIL # BLD: 0.1 K/UL (ref 0–0.5)
EOSINOPHIL NFR BLD: 1 % (ref 0–6)
ERYTHROCYTE [DISTWIDTH] IN BLOOD BY AUTOMATED COUNT: 14.6 % (ref 10–15.5)
GFRAA, 66117: >60
GFRNA, 66118: >60
GLOBULIN,GLOB: 2.6 G/DL (ref 2–4)
GLUCOSE SERPL-MCNC: 99 MG/DL (ref 70–99)
GRANULOCYTES,GRANS: 35 % (ref 40–75)
HCT VFR BLD AUTO: 42.6 % (ref 36.6–51.9)
HDLC SERPL-MCNC: 30 MG/DL
HDLC SERPL-MCNC: 7.2 MG/DL (ref 0–5)
HGB BLD-MCNC: 13.5 G/DL (ref 13.2–17.3)
LDL/HDL RATIO,LDHD: 4.5
LDLC SERPL CALC-MCNC: 137 MG/DL (ref 50–99)
LYMPHOCYTES, LYMLT: 54 % (ref 20–45)
MCH RBC QN AUTO: 31 PG (ref 26–34)
MCHC RBC AUTO-ENTMCNC: 32 G/DL (ref 31–36)
MCV RBC AUTO: 98 FL (ref 80–95)
MONOCYTES # BLD: 0.5 K/UL (ref 0.1–1)
MONOCYTES NFR BLD: 8 % (ref 3–12)
NEUTROPHILS # BLD AUTO: 1.9 K/UL (ref 1.8–7.7)
NON-HDL CHOLESTEROL, 011976: 185 MG/DL
PLATELET # BLD AUTO: 182 K/UL (ref 140–440)
PMV BLD AUTO: 9.8 FL (ref 9–13)
POTASSIUM SERPL-SCNC: 4.6 MMOL/L (ref 3.5–5.5)
PROT SERPL-MCNC: 7.1 G/DL (ref 6.4–8.3)
RBC # BLD AUTO: 4.37 M/UL (ref 3.8–5.8)
SODIUM SERPL-SCNC: 137 MMOL/L (ref 133–145)
TRIGL SERPL-MCNC: 236 MG/DL (ref 40–149)
VLDLC SERPL CALC-MCNC: 47 MG/DL (ref 8–30)
WBC # BLD AUTO: 5.5 K/UL (ref 4–11)

## 2021-08-10 ENCOUNTER — OFFICE VISIT (OUTPATIENT)
Dept: INTERNAL MEDICINE CLINIC | Age: 36
End: 2021-08-10
Payer: COMMERCIAL

## 2021-08-10 VITALS
HEART RATE: 77 BPM | TEMPERATURE: 97 F | BODY MASS INDEX: 37.16 KG/M2 | SYSTOLIC BLOOD PRESSURE: 125 MMHG | RESPIRATION RATE: 16 BRPM | WEIGHT: 259 LBS | OXYGEN SATURATION: 99 % | DIASTOLIC BLOOD PRESSURE: 79 MMHG

## 2021-08-10 DIAGNOSIS — F90.2 ATTENTION DEFICIT HYPERACTIVITY DISORDER (ADHD), COMBINED TYPE: ICD-10-CM

## 2021-08-10 DIAGNOSIS — I10 ESSENTIAL HYPERTENSION: Primary | ICD-10-CM

## 2021-08-10 DIAGNOSIS — F33.1 MODERATE EPISODE OF RECURRENT MAJOR DEPRESSIVE DISORDER (HCC): ICD-10-CM

## 2021-08-10 DIAGNOSIS — M25.552 LEFT HIP PAIN: ICD-10-CM

## 2021-08-10 DIAGNOSIS — F41.9 ANXIETY: ICD-10-CM

## 2021-08-10 PROCEDURE — 99214 OFFICE O/P EST MOD 30 MIN: CPT | Performed by: INTERNAL MEDICINE

## 2021-08-10 RX ORDER — FLUOXETINE HYDROCHLORIDE 20 MG/1
20 CAPSULE ORAL DAILY
Qty: 30 CAPSULE | Refills: 2 | Status: SHIPPED | OUTPATIENT
Start: 2021-08-10 | End: 2021-11-02

## 2021-08-10 NOTE — PROGRESS NOTES
Pt is here for   Chief Complaint   Patient presents with    Hypertension     follow up    Anemia     1. Have you been to the ER, urgent care clinic or hospitalized since your last visit? NO.     2. Have you seen or consulted any other health care providers outside of the [address] [place] [address] since your last visit (Include any pap smears or colon screening)? NO      Do you have an Advanced Directive? NO    Would you like information on Advanced Directives?  NO

## 2021-08-15 NOTE — PROGRESS NOTES
Dianne Ochoa is a 37 y.o.  male and presents with Hypertension, Anemia, Depression, Attention Deficit Disorder, Hip Pain (f/u with PT ), and Obesity      SUBJECTIVE:    Patient continues to have mild left hip pain and was referred to physical therapy by orthopedics. He will follow up with any motion physical therapy on placement for follow-up. He already has a prescription from orthopedics. Patient does not seem to have issue with anemia after checking his CBC off of iron supplementation. In reference to patient's high blood pressure he is not taking Zestoretic on a regular basis. In the last week he is taking it 2 times. Recommended to him to stop the medication and monitor his blood pressure for the next month and would only restart his Zestoretic if his systolic blood pressure goes above 140 on a regular basis. Patient is working on trying to lose weight by cutting back starches and sugar in his diet. Patient remains under a lot of stress trying to find a new career. He has underlying anxiety as well as possible depression. He has been on multiple medications for anxiety and depression in the past which she did not tolerate for 1 reason or another. Patient is willing to try another medication until he can follow-up with a therapist and get himself together mentally is to start finding a new career. Patient denies any suicidal ideation currently. Remarks that he is living with his parents which is a stressor for him since he is  with a child. Patient also may have some underlying ADD which will screen for at next office visit and consider Vyvanse since Adderall made him feel too stimulated in the past.      Respiratory ROS: negative for - shortness of breath  Cardiovascular ROS: negative for - chest pain    Current Outpatient Medications   Medication Sig    FLUoxetine (PROzac) 20 mg capsule Take 1 Capsule by mouth daily.  OTHER Take 6 Capsules by mouth daily.  Grassfed Beef Liver  OTHER 3 UNSPECIFIED. Organic Men's Multi vit 3 gummies seferino    ibuprofen (MOTRIN) 800 mg tablet Take  by mouth.  montelukast (Singulair) 10 mg tablet Take 1 Tablet by mouth daily.  OTHER Men's multivitamin    OTHER Iron 18mg po daily    levocetirizine (Xyzal) 5 mg tablet Take  by mouth.  fluticasone propionate (Flonase Allergy Relief) 50 mcg/actuation nasal spray 2 Sprays by Both Nostrils route daily as needed for Rhinitis or Allergies.  lisinopril-hydroCHLOROthiazide (PRINZIDE, ZESTORETIC) 10-12.5 mg per tablet Take 1 Tab by mouth daily.  methylPREDNISolone (Medrol, Dilip,) 4 mg tablet Take per dose pack instructions (Patient not taking: Reported on 6/29/2021)    multivitamin, tx-iron-ca-min (THERA-M w/ IRON) 9 mg iron-400 mcg tab tablet Take 1 Tablet by mouth daily. (Patient not taking: Reported on 6/29/2021)    sodium chloride (Saline Nasal Mist) 0.65 % nasal squeeze bottle 0.05 mL by Both Nostrils route as needed for Congestion. (Patient not taking: Reported on 6/17/2021)    clobetasoL (TEMOVATE) 0.05 % ointment APPLY SPARINGLY TO AFFECTED AREA(S) TWICE DAILY DO NOT USE ON FACE OR GROIN (Patient not taking: Reported on 6/17/2021)    cholecalciferol (VITAMIN D3) (50,000 UNITS /1250 MCG) capsule TAKE ONE CAPSULE BY MOUTH EACH WEEK (Patient not taking: Reported on 6/17/2021)    cyclobenzaprine (FLEXERIL) 10 mg tablet Take 1 Tab by mouth nightly as needed for Muscle Spasm(s). Indications: muscle spasm (Patient not taking: Reported on 6/17/2021)    ergocalciferol (ERGOCALCIFEROL) 50,000 unit capsule Take 1 Cap by mouth every seven (7) days. (Patient not taking: Reported on 6/17/2021)     No current facility-administered medications for this visit. OBJECTIVE:  alert, well appearing, and in no distress. Patient appears depressed.   Visit Vitals  /79 (BP 1 Location: Left arm, BP Patient Position: Sitting, BP Cuff Size: Large adult)   Pulse 77   Temp 97 °F (36.1 °C) (Temporal) Resp 16   Wt 259 lb (117.5 kg)   SpO2 99%   BMI 37.16 kg/m²      well developed and well nourished          Labs:   Lab Results   Component Value Date/Time    WBC 5.5 08/06/2021 10:20 AM    HGB 13.5 08/06/2021 10:20 AM    HCT 42.6 08/06/2021 10:20 AM    PLATELET 831 99/34/5389 10:20 AM    MCV 98 (H) 08/06/2021 10:20 AM     Lab Results   Component Value Date/Time    Cholesterol, total 215 (H) 08/06/2021 10:20 AM    HDL Cholesterol 30 (L) 08/06/2021 10:20 AM    LDL, calculated 137 (H) 08/06/2021 10:20 AM    Triglyceride 236 (H) 08/06/2021 10:20 AM     Lab Results   Component Value Date/Time    Sodium 137 08/06/2021 10:20 AM    Potassium 4.6 08/06/2021 10:20 AM    Chloride 99 08/06/2021 10:20 AM    CO2 25 08/06/2021 10:20 AM    Anion gap 13.0 08/06/2021 10:20 AM    Glucose 99 08/06/2021 10:20 AM    BUN 12 08/06/2021 10:20 AM    Creatinine 1.2 08/06/2021 10:20 AM    BUN/Creatinine ratio 10 (L) 01/25/2021 12:31 PM    GFR est AA >60 01/25/2021 12:31 PM    GFR est non-AA >60 01/25/2021 12:31 PM    Calcium 9.7 08/06/2021 10:20 AM    Bilirubin, total 0.2 08/06/2021 10:20 AM    ALT (SGPT) 29 08/06/2021 10:20 AM    Alk. phosphatase 76 08/06/2021 10:20 AM    Protein, total 7.1 08/06/2021 10:20 AM    Albumin 4.5 08/06/2021 10:20 AM    Globulin 2.6 08/06/2021 10:20 AM    A-G Ratio 1.7 08/06/2021 10:20 AM          Assessment/Plan      ICD-10-CM ICD-9-CM    1. Essential hypertension  I10 401.9  controlled currently off of medications. Patient to stop Zestoretic altogether and will monitor blood pressure for the next 4 weeks to see if he really needs to be on a medication. He will continue to work on trying to lose weight   2. Anxiety  F41.9 300.00  uncontrolled we will try Prozac and patient given names for therapists   3. Moderate episode of recurrent major depressive disorder (HCC)  F33.1 296.32  will try FLUoxetine (PROzac) 20 mg capsule   4.  Attention deficit hyperactivity disorder (ADHD), combined type  F90.2 314.01  will consider screening and consider Vyvanse in the near future since patient did not tolerate Adderall in the past   5. Left hip pain  M25.558 659.45  patient to follow-up with in motion physical therapy for further treatment. He has already seen orthopedics       Follow-up and Dispositions    · Return in about 4 weeks (around 9/7/2021) for Depression, ADD. Reviewed plan of care. Patient has provided input and agrees with goals.

## 2021-09-09 ENCOUNTER — OFFICE VISIT (OUTPATIENT)
Dept: INTERNAL MEDICINE CLINIC | Age: 36
End: 2021-09-09
Payer: COMMERCIAL

## 2021-09-09 VITALS
OXYGEN SATURATION: 98 % | WEIGHT: 258 LBS | HEART RATE: 104 BPM | SYSTOLIC BLOOD PRESSURE: 146 MMHG | HEIGHT: 70 IN | RESPIRATION RATE: 18 BRPM | BODY MASS INDEX: 36.94 KG/M2 | DIASTOLIC BLOOD PRESSURE: 97 MMHG | TEMPERATURE: 98.5 F

## 2021-09-09 DIAGNOSIS — F90.2 ATTENTION DEFICIT HYPERACTIVITY DISORDER (ADHD), COMBINED TYPE: ICD-10-CM

## 2021-09-09 DIAGNOSIS — G47.33 OBSTRUCTIVE SLEEP APNEA SYNDROME: ICD-10-CM

## 2021-09-09 DIAGNOSIS — I10 ESSENTIAL HYPERTENSION: Primary | ICD-10-CM

## 2021-09-09 DIAGNOSIS — F41.9 ANXIETY: ICD-10-CM

## 2021-09-09 DIAGNOSIS — Z51.81 THERAPEUTIC DRUG MONITORING: ICD-10-CM

## 2021-09-09 PROCEDURE — 99214 OFFICE O/P EST MOD 30 MIN: CPT | Performed by: INTERNAL MEDICINE

## 2021-09-09 NOTE — PATIENT INSTRUCTIONS
A Healthy Lifestyle: Care Instructions  Your Care Instructions     A healthy lifestyle can help you feel good, stay at a healthy weight, and have plenty of energy for both work and play. A healthy lifestyle is something you can share with your whole family. A healthy lifestyle also can lower your risk for serious health problems, such as high blood pressure, heart disease, and diabetes. You can follow a few steps listed below to improve your health and the health of your family. Follow-up care is a key part of your treatment and safety. Be sure to make and go to all appointments, and call your doctor if you are having problems. It's also a good idea to know your test results and keep a list of the medicines you take. How can you care for yourself at home? · Do not eat too much sugar, fat, or fast foods. You can still have dessert and treats now and then. The goal is moderation. · Start small to improve your eating habits. Pay attention to portion sizes, drink less juice and soda pop, and eat more fruits and vegetables. ? Eat a healthy amount of food. A 3-ounce serving of meat, for example, is about the size of a deck of cards. Fill the rest of your plate with vegetables and whole grains. ? Limit the amount of soda and sports drinks you have every day. Drink more water when you are thirsty. ? Eat plenty of fruits and vegetables every day. Have an apple or some carrot sticks as an afternoon snack instead of a candy bar. Try to have fruits and/or vegetables at every meal.  · Make exercise part of your daily routine. You may want to start with simple activities, such as walking, bicycling, or slow swimming. Try to be active 30 to 60 minutes every day. You do not need to do all 30 to 60 minutes all at once. For example, you can exercise 3 times a day for 10 or 20 minutes.  Moderate exercise is safe for most people, but it is always a good idea to talk to your doctor before starting an exercise program.  · Keep moving. Jeny Eis the lawn, work in the garden, or "[employer]. Take the stairs instead of the elevator at work. · If you smoke, quit. People who smoke have an increased risk for heart attack, stroke, cancer, and other lung illnesses. Quitting is hard, but there are ways to boost your chance of quitting tobacco for good. ? Use nicotine gum, patches, or lozenges. ? Ask your doctor about stop-smoking programs and medicines. ? Keep trying. In addition to reducing your risk of diseases in the future, you will notice some benefits soon after you stop using tobacco. If you have shortness of breath or asthma symptoms, they will likely get better within a few weeks after you quit. · Limit how much alcohol you drink. Moderate amounts of alcohol (up to 2 drinks a day for men, 1 drink a day for women) are okay. But drinking too much can lead to liver problems, high blood pressure, and other health problems. Family health  If you have a family, there are many things you can do together to improve your health. · Eat meals together as a family as often as possible. · Eat healthy foods. This includes fruits, vegetables, lean meats and dairy, and whole grains. · Include your family in your fitness plan. Most people think of activities such as jogging or tennis as the way to fitness, but there are many ways you and your family can be more active. Anything that makes you breathe hard and gets your heart pumping is exercise. Here are some tips:  ? Walk to do errands or to take your child to school or the bus.  ? Go for a family bike ride after dinner instead of watching TV. Where can you learn more? Go to http://www.gray.com/  Enter A686 in the search box to learn more about \"A Healthy Lifestyle: Care Instructions. \"  Current as of: September 23, 2020               Content Version: 12.8  © 6289-2953 [employer].    Care instructions adapted under license by [employer] (which disclaims liability or warranty for this information). If you have questions about a medical condition or this instruction, always ask your healthcare professional. [address] any warranty or liability for your use of this information.

## 2021-09-09 NOTE — PROGRESS NOTES
Yazan Saab is a 31 y.o.  male and presents with Depression, Attention Deficit Disorder, Anxiety, Sleep Apnea, and Hypertension (try monitor at home )      SUBJECTIVE:    Patient follows up for anxiety and depression which has improved on Prozac 20 mg daily. Patient had some mild diarrhea that he thought was a side effect and it is slowly improving. Patient is also seeing a therapist now. Patient's other issue is that he has history of ADHD which was diagnosed when he was a child. He was treated with Ritalin at that time but did not take it. He was treated as an adult with Adderall which made him agitated and irritable. He is therefore more likely do well with extended release medication such as Vyvanse. He also is having neuropsych testing done to see what other underlying issues he may have such as any learning disability etc.  In reference to patient's high blood pressure it  remains uncontrolled probably exacerbated by his weight gain and possible underlying sleep apnea. Patient is very reluctant to restart blood pressure medicines so have encouraged him to decrease his BMI from 37 by cutting back starches and sweets in his diet I will go ahead and refer for sleep apnea evaluation. Respiratory ROS: negative for - shortness of breath  Cardiovascular ROS: negative for - chest pain    Current Outpatient Medications   Medication Sig    lisdexamfetamine (VYVANSE) 30 mg capsule Take 1 Capsule by mouth daily. Max Daily Amount: 30 mg.    FLUoxetine (PROzac) 20 mg capsule Take 1 Capsule by mouth daily.  montelukast (Singulair) 10 mg tablet Take 1 Tablet by mouth daily.  levocetirizine (Xyzal) 5 mg tablet Take  by mouth.  fluticasone propionate (Flonase Allergy Relief) 50 mcg/actuation nasal spray 2 Sprays by Both Nostrils route daily as needed for Rhinitis or Allergies.  OTHER Take 6 Capsules by mouth daily.  Grassfed Beef Liver (Patient not taking: Reported on 9/9/2021)    OTHER 3 UNSPECIFIED. Organic Men's Multi vit 3 gummies seferino (Patient not taking: Reported on 9/9/2021)    ibuprofen (MOTRIN) 800 mg tablet Take  by mouth. (Patient not taking: Reported on 9/9/2021)    OTHER Men's multivitamin (Patient not taking: Reported on 9/9/2021)    OTHER Iron 18mg po daily (Patient not taking: Reported on 9/9/2021)    multivitamin, tx-iron-ca-min (THERA-M w/ IRON) 9 mg iron-400 mcg tab tablet Take 1 Tablet by mouth daily. (Patient not taking: Reported on 6/29/2021)    sodium chloride (Saline Nasal Mist) 0.65 % nasal squeeze bottle 0.05 mL by Both Nostrils route as needed for Congestion. (Patient not taking: Reported on 6/17/2021)    clobetasoL (TEMOVATE) 0.05 % ointment APPLY SPARINGLY TO AFFECTED AREA(S) TWICE DAILY DO NOT USE ON FACE OR GROIN (Patient not taking: Reported on 6/17/2021)    cholecalciferol (VITAMIN D3) (50,000 UNITS /1250 MCG) capsule TAKE ONE CAPSULE BY MOUTH EACH WEEK (Patient not taking: Reported on 6/17/2021)    lisinopril-hydroCHLOROthiazide (PRINZIDE, ZESTORETIC) 10-12.5 mg per tablet Take 1 Tab by mouth daily. (Patient not taking: Reported on 9/9/2021)    cyclobenzaprine (FLEXERIL) 10 mg tablet Take 1 Tab by mouth nightly as needed for Muscle Spasm(s). Indications: muscle spasm (Patient not taking: Reported on 6/17/2021)    ergocalciferol (ERGOCALCIFEROL) 50,000 unit capsule Take 1 Cap by mouth every seven (7) days. (Patient not taking: Reported on 6/17/2021)     No current facility-administered medications for this visit. OBJECTIVE:  alert, well appearing, and in no distress. Patient appears depressed.   Visit Vitals  BP (!) 146/97 (BP 1 Location: Left arm, BP Patient Position: Sitting, BP Cuff Size: Adult)   Pulse (!) 104   Temp 98.5 °F (36.9 °C) (Temporal)   Resp 18   Ht 5' 10\" (1.778 m)   Wt 258 lb (117 kg)   SpO2 98%   BMI 37.02 kg/m²      well developed and well nourished          Labs:   Lab Results   Component Value Date/Time    WBC 5.5 08/06/2021 10:20 AM    HGB 13.5 08/06/2021 10:20 AM    HCT 42.6 08/06/2021 10:20 AM    PLATELET 780 43/13/5360 10:20 AM    MCV 98 (H) 08/06/2021 10:20 AM     Lab Results   Component Value Date/Time    Cholesterol, total 215 (H) 08/06/2021 10:20 AM    HDL Cholesterol 30 (L) 08/06/2021 10:20 AM    LDL, calculated 137 (H) 08/06/2021 10:20 AM    Triglyceride 236 (H) 08/06/2021 10:20 AM     Lab Results   Component Value Date/Time    Sodium 137 08/06/2021 10:20 AM    Potassium 4.6 08/06/2021 10:20 AM    Chloride 99 08/06/2021 10:20 AM    CO2 25 08/06/2021 10:20 AM    Anion gap 13.0 08/06/2021 10:20 AM    Glucose 99 08/06/2021 10:20 AM    BUN 12 08/06/2021 10:20 AM    Creatinine 1.2 08/06/2021 10:20 AM    BUN/Creatinine ratio 10 (L) 01/25/2021 12:31 PM    GFR est AA >60 01/25/2021 12:31 PM    GFR est non-AA >60 01/25/2021 12:31 PM    Calcium 9.7 08/06/2021 10:20 AM    Bilirubin, total 0.2 08/06/2021 10:20 AM    ALT (SGPT) 29 08/06/2021 10:20 AM    Alk. phosphatase 76 08/06/2021 10:20 AM    Protein, total 7.1 08/06/2021 10:20 AM    Albumin 4.5 08/06/2021 10:20 AM    Globulin 2.6 08/06/2021 10:20 AM    A-G Ratio 1.7 08/06/2021 10:20 AM          Assessment/Plan      ICD-10-CM ICD-9-CM    1. Essential hypertension  I10 401.9  uncontrolled if patient unable to improve with diet and weight loss will need to consider restarting blood pressure medications   2. Obstructive sleep apnea syndrome  G47.33 327.23 REFERRAL TO NEUROLOGY for sleep apnea evaluation   3. Attention deficit hyperactivity disorder (ADHD), combined type  F90.2 314.01  uncontrolled we will start on lisdexamfetamine (VYVANSE) 30 mg capsule      COMPLIANCE DRUG SCREEN/PRESCRIPTION MONITORING   4. Anxiety  F41.9 300.00  improved on Prozac 20 mg daily. Patient also seeing a therapist   5.  Therapeutic drug monitoring  Z51.81 V58.83 COMPLIANCE DRUG SCREEN/PRESCRIPTION MONITORING       Follow-up and Dispositions    · Return in about 4 weeks (around 10/7/2021) for ADD, BP check. Reviewed plan of care. Patient has provided input and agrees with goals.

## 2021-09-09 NOTE — LETTER
9/9/2021 12:07 PM    Mr. Prakash Rodríguez  [address] [facility] [address] [id number]      To Whom It Concern;    Patient was seen in the office today.           Sincerely,      Valentin Daily LPN

## 2021-09-09 NOTE — PROGRESS NOTES
Patient is in the office today for a 4 week follow up. 1. Have you been to the ER, urgent care clinic since your last visit? Hospitalized since your last visit? No    2. Have you seen or consulted any other health care providers outside of the [address] [place] [address] since your last visit? Include any pap smears or colon screening. yes, Dr. Chino Kim for hip pain.

## 2021-09-10 DIAGNOSIS — F90.2 ATTENTION DEFICIT HYPERACTIVITY DISORDER (ADHD), COMBINED TYPE: ICD-10-CM

## 2021-09-10 DIAGNOSIS — Z51.81 THERAPEUTIC DRUG MONITORING: ICD-10-CM

## 2021-09-13 ENCOUNTER — APPOINTMENT (OUTPATIENT)
Dept: INTERNAL MEDICINE CLINIC | Age: 36
End: 2021-09-13

## 2021-09-13 NOTE — TELEPHONE ENCOUNTER
Pt called because his insurance does not cover lisdexamfetamine. He wants to know can you give him another prescriptions that his insurance covers. His insurance is [place]. Please advise.  Thank you!!!

## 2021-09-13 NOTE — TELEPHONE ENCOUNTER
Patient is aware per [employer] they require a UDS to be done prior to approval of medication. Patient states he will come today to have this done.

## 2021-09-16 ENCOUNTER — APPOINTMENT (OUTPATIENT)
Dept: GENERAL RADIOLOGY | Age: 36
End: 2021-09-16
Attending: EMERGENCY MEDICINE
Payer: COMMERCIAL

## 2021-09-16 ENCOUNTER — HOSPITAL ENCOUNTER (EMERGENCY)
Age: 36
Discharge: HOME | End: 2021-09-16
Attending: EMERGENCY MEDICINE
Payer: COMMERCIAL

## 2021-09-16 VITALS
BODY MASS INDEX: 37.03 KG/M2 | WEIGHT: 250 LBS | HEIGHT: 69 IN | SYSTOLIC BLOOD PRESSURE: 151 MMHG | OXYGEN SATURATION: 97 % | HEART RATE: 87 BPM | DIASTOLIC BLOOD PRESSURE: 103 MMHG | TEMPERATURE: 98.2 F | RESPIRATION RATE: 16 BRPM

## 2021-09-16 DIAGNOSIS — J01.91 ACUTE RECURRENT SINUSITIS, UNSPECIFIED LOCATION: Primary | ICD-10-CM

## 2021-09-16 LAB
COVID-19 RAPID TEST, COVR: NOT DETECTED
SOURCE, COVRS: NORMAL

## 2021-09-16 PROCEDURE — 71046 X-RAY EXAM CHEST 2 VIEWS: CPT

## 2021-09-16 PROCEDURE — 87635 SARS-COV-2 COVID-19 AMP PRB: CPT

## 2021-09-16 PROCEDURE — 99282 EMERGENCY DEPT VISIT SF MDM: CPT

## 2021-09-16 RX ORDER — AZITHROMYCIN 250 MG/1
TABLET, FILM COATED ORAL
Qty: 6 TABLET | Refills: 0 | Status: SHIPPED | OUTPATIENT
Start: 2021-09-16 | End: 2021-09-21

## 2021-09-16 RX ORDER — PREDNISONE 20 MG/1
60 TABLET ORAL DAILY
Qty: 15 TABLET | Refills: 0 | Status: SHIPPED | OUTPATIENT
Start: 2021-09-16 | End: 2021-09-21

## 2021-09-16 NOTE — ED PROVIDER NOTES
The patient is a 81-year-old male with past medical history significant for environmental allergies and obstructive sleep apnea, who presents to the ED today with nasal congestion and a sore throat. He is concerned that he may have Covid. He states that he is always congested but his nasal congestion has changed and now he is having yellow drainage that is thick in nature. He is also feeling pressure in his sinuses. His ears are popping. He denies any fevers. He is also complaining of a slight cough secondary to the postnasal drip. His daughter's  sent out a note several days ago, stating that one of the children at the  was positive for Covid and a another child was a contact of a Covid positive person. 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 He is not Covid immunized at this time.            Past Medical History:   Diagnosis Date    Anemia     Condyloma acuminatum     Environmental allergies     Groin pain     Obstructive sleep apnea on CPAP     CATARINA (obstructive sleep apnea)     Plantar fasciitis     Sinusitis     Tinea pedis        Past Surgical History:   Procedure Laterality Date    HX OTHER SURGICAL      dental         Family History:   Problem Relation Age of Onset    Hypertension Mother     Diabetes Mother     Hypertension Father        Social History     Socioeconomic History    Marital status:      Spouse name: Not on file    Number of children: Not on file    Years of education: Not on file    Highest education level: Not on file   Occupational History    Not on file   Tobacco Use    Smoking status: Never Smoker    Smokeless tobacco: Never Used   Substance and Sexual Activity    Alcohol use: No    Drug use: No    Sexual activity: Not on file   Other Topics Concern    Not on file   Social History Narrative    Not on file     Social Determinants of Health     Financial Resource Strain:     Difficulty of Paying Living Expenses:    Food Insecurity:     Worried About Running Out of Food in the Last Year:     [address] in the Last Year:    Transportation Needs:     Lack of Transportation (Medical):  Lack of Transportation (Non-Medical):    Physical Activity:     Days of Exercise per Week:     Minutes of Exercise per Session:    Stress:     Feeling of Stress :    Social Connections:     Frequency of Communication with Friends and Family:     Frequency of Social Gatherings with Friends and Family:     Attends Holiness Services:     Active Member of Clubs or Organizations:     Attends Club or Organization Meetings:     Marital Status:    Intimate Partner Violence:     Fear of Current or Ex-Partner:     Emotionally Abused:     Physically Abused:     Sexually Abused: ALLERGIES: Penicillins, Mold extracts, and Peanut    Review of Systems   All other systems reviewed and are negative. Vitals:    09/16/21 0440   BP: (!) 151/103   Pulse: 87   Resp: 16   Temp: 98.2 °F (36.8 °C)   SpO2: 97%   Weight: 113.4 kg (250 lb)   Height: 5' 9\" (1.753 m)            Physical Exam  Vitals and nursing note reviewed. Constitutional:       General: He is not in acute distress. Appearance: He is obese. HENT:      Head: Normocephalic and atraumatic. Right Ear: Ear canal normal. A middle ear effusion is present. Tympanic membrane is erythematous. Left Ear: Ear canal normal. A middle ear effusion is present. Tympanic membrane is erythematous. Nose: Congestion present. Mouth/Throat:      Mouth: Mucous membranes are moist.      Pharynx: Posterior oropharyngeal erythema present. No oropharyngeal exudate. Tonsils: No tonsillar exudate. 1+ on the right. 1+ on the left. Eyes:      Conjunctiva/sclera: Conjunctivae normal.      Pupils: Pupils are equal, round, and reactive to light. Cardiovascular:      Rate and Rhythm: Normal rate and regular rhythm. Heart sounds: Normal heart sounds. Pulmonary:      Effort: Pulmonary effort is normal.      Breath sounds: Normal breath sounds. Abdominal:      General: Bowel sounds are normal.      Palpations: Abdomen is soft. Musculoskeletal:      Cervical back: Normal range of motion and neck supple. Skin:     General: Skin is warm and dry. Capillary Refill: Capillary refill takes less than 2 seconds. Neurological:      General: No focal deficit present. Mental Status: He is alert and oriented to person, place, and time. Psychiatric:         Mood and Affect: Mood normal.         Behavior: Behavior normal.        Recent Results (from the past 12 hour(s))   COVID-19 RAPID TEST    Collection Time: 09/16/21  4:50 AM   Result Value Ref Range    Specimen source Nasopharyngeal      COVID-19 rapid test Not detected NOTD         XR CHEST PA LAT    (Results Pending)     CXR: No evidence of effusions or infiltrate. MDM  Number of Diagnoses or Management Options  Diagnosis management comments: The patient is a 62-year-old male who presents to the ED today with nasal congestion, sinus pressure, sore throat, and a very mild cough. He has had Covid exposure. His chest x-ray is negative for pneumonia. We are awaiting his rapid Covid test at this time. If it is positive, he would be a candidate for Regeneron based on his BMI, history of hypertension, and obstructive sleep apnea requiring CPAP. The patient's covid test is negative. This is more likely to be a viral URI or acute sinusitis. I will discharge the patient home with a prescription for azithromycin and prednisone. He will be advised to follow up with his PCP in 2-3 days. Return precautions have been given.            Procedures

## 2021-09-16 NOTE — LETTER
[address] [facility] EMERGENCY DEPT  [id number] [facility] [id number] [phone]    Work/School Note    Date: 9/16/2021    To Whom It May concern:    Yazan Saab was seen and treated today in the emergency room by the following provider(s):  Attending Provider: Marissa Vargas MD.      Yazan Saab may return to work on 9/17/21.     Sincerely,          Daisy Gutierrez RN

## 2021-09-16 NOTE — ED TRIAGE NOTES
Alert and oriented male with nasal congestion x 3 days, sore throat x 2 days. Concerned for covid-19.

## 2021-09-18 LAB — REPORT SUMMARY: NORMAL

## 2021-09-29 ENCOUNTER — TELEPHONE (OUTPATIENT)
Dept: INTERNAL MEDICINE CLINIC | Age: 36
End: 2021-09-29

## 2021-11-02 ENCOUNTER — OFFICE VISIT (OUTPATIENT)
Dept: INTERNAL MEDICINE CLINIC | Age: 36
End: 2021-11-02
Payer: COMMERCIAL

## 2021-11-02 VITALS
TEMPERATURE: 98.5 F | RESPIRATION RATE: 20 BRPM | HEIGHT: 69 IN | OXYGEN SATURATION: 100 % | SYSTOLIC BLOOD PRESSURE: 135 MMHG | DIASTOLIC BLOOD PRESSURE: 79 MMHG | WEIGHT: 248 LBS | HEART RATE: 94 BPM | BODY MASS INDEX: 36.73 KG/M2

## 2021-11-02 DIAGNOSIS — F90.2 ATTENTION DEFICIT HYPERACTIVITY DISORDER (ADHD), COMBINED TYPE: Primary | ICD-10-CM

## 2021-11-02 DIAGNOSIS — F41.9 ANXIETY: ICD-10-CM

## 2021-11-02 DIAGNOSIS — I10 ESSENTIAL HYPERTENSION: ICD-10-CM

## 2021-11-02 DIAGNOSIS — G47.33 OBSTRUCTIVE SLEEP APNEA SYNDROME: ICD-10-CM

## 2021-11-02 PROCEDURE — 99213 OFFICE O/P EST LOW 20 MIN: CPT | Performed by: INTERNAL MEDICINE

## 2021-11-02 NOTE — PROGRESS NOTES
Patient is in the office today for a 4 week follow up. 1. Have you been to the ER, urgent care clinic since your last visit? Hospitalized since your last visit? yes, Work injury seen at Urgent [address] [place] [address]. 2. Have you seen or consulted any other health care providers outside of the [address] [place] [address] since your last visit? Include any pap smears or colon screening.  No

## 2021-11-02 NOTE — PATIENT INSTRUCTIONS
High Blood Pressure: Care Instructions Overview It's normal for blood pressure to go up and down throughout the day. But if it stays up, you have high blood pressure. Another name for high blood pressure is hypertension. Despite what a lot of people think, high blood pressure usually doesn't cause headaches or make you feel dizzy or lightheaded. It usually has no symptoms. But it does increase your risk of stroke, heart attack, and other problems. You and your doctor will talk about your risks of these problems based on your blood pressure. Your doctor will give you a goal for your blood pressure. Your goal will be based on your health and your age. Lifestyle changes, such as eating healthy and being active, are always important to help lower blood pressure. You might also take medicine to reach your blood pressure goal. 
Follow-up care is a key part of your treatment and safety. Be sure to make and go to all appointments, and call your doctor if you are having problems. It's also a good idea to know your test results and keep a list of the medicines you take. How can you care for yourself at home? Medical treatment · If you stop taking your medicine, your blood pressure will go back up. You may take one or more types of medicine to lower your blood pressure. Be safe with medicines. Take your medicine exactly as prescribed. Call your doctor if you think you are having a problem with your medicine. · Talk to your doctor before you start taking aspirin every day. Aspirin can help certain people lower their risk of a heart attack or stroke. But taking aspirin isn't right for everyone, because it can cause serious bleeding. · See your doctor regularly. You may need to see the doctor more often at first or until your blood pressure comes down. · If you are taking blood pressure medicine, talk to your doctor before you take decongestants or anti-inflammatory medicine, such as ibuprofen.  Some of these medicines can raise blood pressure. · Learn how to check your blood pressure at home. Lifestyle changes · Stay at a healthy weight. This is especially important if you put on weight around the waist. Losing even 10 pounds can help you lower your blood pressure. · If your doctor recommends it, get more exercise. Walking is a good choice. Bit by bit, increase the amount you walk every day. Try for at least 30 minutes on most days of the week. You also may want to swim, bike, or do other activities. · Avoid or limit alcohol. Talk to your doctor about whether you can drink any alcohol. · Try to limit how much sodium you eat to less than 2,300 milligrams (mg) a day. Your doctor may ask you to try to eat less than 1,500 mg a day. · Eat plenty of fruits (such as bananas and oranges), vegetables, legumes, whole grains, and low-fat dairy products. · Lower the amount of saturated fat in your diet. Saturated fat is found in animal products such as milk, cheese, and meat. Limiting these foods may help you lose weight and also lower your risk for heart disease. · Do not smoke. Smoking increases your risk for heart attack and stroke. If you need help quitting, talk to your doctor about stop-smoking programs and medicines. These can increase your chances of quitting for good. When should you call for help? Call 911  anytime you think you may need emergency care. This may mean having symptoms that suggest that your blood pressure is causing a serious heart or blood vessel problem. Your blood pressure may be over 180/120. For example, call 911 if: 
  · You have symptoms of a heart attack. These may include: 
? Chest pain or pressure, or a strange feeling in the chest. 
? Sweating. ? Shortness of breath. ? Nausea or vomiting. ? Pain, pressure, or a strange feeling in the back, neck, jaw, or upper belly or in one or both shoulders or arms. ? Lightheadedness or sudden weakness.  
? A fast or irregular heartbeat.  
  · You have symptoms of a stroke. These may include: 
? Sudden numbness, tingling, weakness, or loss of movement in your face, arm, or leg, especially on only one side of your body. ? Sudden vision changes. ? Sudden trouble speaking. ? Sudden confusion or trouble understanding simple statements. ? Sudden problems with walking or balance. ? A sudden, severe headache that is different from past headaches.  
  · You have severe back or belly pain. Do not wait until your blood pressure comes down on its own. Get help right away. Call your doctor now or seek immediate care if: 
  · Your blood pressure is much higher than normal (such as 180/120 or higher), but you don't have symptoms.  
  · You think high blood pressure is causing symptoms, such as: 
? Severe headache. 
? Blurry vision. Watch closely for changes in your health, and be sure to contact your doctor if: 
  · Your blood pressure measures higher than your doctor recommends at least 2 times. That means the top number is higher or the bottom number is higher, or both.  
  · You think you may be having side effects from your blood pressure medicine. Where can you learn more? Go to http://www.gray.com/ Enter B806 in the search box to learn more about \"High Blood Pressure: Care Instructions. \" Current as of: April 29, 2021               Content Version: 13.0 © 2006-2021 [employer]. Care instructions adapted under license by [employer] (which disclaims liability or warranty for this information). If you have questions about a medical condition or this instruction, always ask your healthcare professional. [place] [address] any warranty or liability for your use of this information.

## 2021-11-04 NOTE — PROGRESS NOTES
Ross Houston is a 31 y.o.  male and presents with Attention Deficit Disorder, Blood Pressure Check (f/u), Anxiety, and Sleep Apnea      SUBJECTIVE:    Patient follows up for attention deficit disorder which has improved on Vyvanse 30 mg daily. Patient still continues to have some difficulty focusing completely on task which I believe increases his anxiety. He is currently seeing a therapist who recommends that his Vyvanse be increased and be placed on a medication for anxiety however patient was on Prozac just recently without any significant improvement with his anxiety. Plan at this time is to increase his Vyvanse and see if he is able to focus better if his anxiety naturally improves. If it does not can consider medication such as BuSpar. Since SSRIs do not seem to work for him. Patient will follow up with the sleep specialist to get his CPAP machine since he has sleep apnea confirmed on home sleep study. We will see if this also helps to improve his anxiety since he related to less better. Patient's high blood pressure is better today wih the patient losing some weight. He will continue to work on weight loss to improve his blood pressure. Respiratory ROS: negative for - shortness of breath  Cardiovascular ROS: negative for - chest pain    Current Outpatient Medications   Medication Sig    lisdexamfetamine (VYVANSE) 50 mg cap Take 1 Capsule by mouth daily for 30 days. Max Daily Amount: 50 mg.    montelukast (Singulair) 10 mg tablet Take 1 Tablet by mouth daily.  levocetirizine (Xyzal) 5 mg tablet Take  by mouth.  fluticasone propionate (Flonase Allergy Relief) 50 mcg/actuation nasal spray 2 Sprays by Both Nostrils route daily as needed for Rhinitis or Allergies.  OTHER Take 6 Capsules by mouth daily. Grassfed Beef Liver (Patient not taking: Reported on 9/9/2021)    OTHER 3 UNSPECIFIED.  Organic Men's Multi vit 3 gummies seferino (Patient not taking: Reported on 9/9/2021)  ibuprofen (MOTRIN) 800 mg tablet Take  by mouth. (Patient not taking: Reported on 9/9/2021)    OTHER Men's multivitamin (Patient not taking: Reported on 9/9/2021)    OTHER Iron 18mg po daily (Patient not taking: Reported on 9/9/2021)    multivitamin, tx-iron-ca-min (THERA-M w/ IRON) 9 mg iron-400 mcg tab tablet Take 1 Tablet by mouth daily. (Patient not taking: Reported on 6/29/2021)    sodium chloride (Saline Nasal Mist) 0.65 % nasal squeeze bottle 0.05 mL by Both Nostrils route as needed for Congestion. (Patient not taking: Reported on 6/17/2021)    clobetasoL (TEMOVATE) 0.05 % ointment APPLY SPARINGLY TO AFFECTED AREA(S) TWICE DAILY DO NOT USE ON FACE OR GROIN (Patient not taking: Reported on 6/17/2021)    cholecalciferol (VITAMIN D3) (50,000 UNITS /1250 MCG) capsule TAKE ONE CAPSULE BY MOUTH EACH WEEK (Patient not taking: Reported on 6/17/2021)    lisinopril-hydroCHLOROthiazide (PRINZIDE, ZESTORETIC) 10-12.5 mg per tablet Take 1 Tab by mouth daily. (Patient not taking: Reported on 9/9/2021)    cyclobenzaprine (FLEXERIL) 10 mg tablet Take 1 Tab by mouth nightly as needed for Muscle Spasm(s). Indications: muscle spasm (Patient not taking: Reported on 6/17/2021)    ergocalciferol (ERGOCALCIFEROL) 50,000 unit capsule Take 1 Cap by mouth every seven (7) days. (Patient not taking: Reported on 6/17/2021)     No current facility-administered medications for this visit. OBJECTIVE:  alert, well appearing, and in no distress. Patient appears depressed.   Visit Vitals  /79 (BP 1 Location: Right arm, BP Patient Position: Sitting, BP Cuff Size: Adult)   Pulse 94   Temp 98.5 °F (36.9 °C) (Temporal)   Resp 20   Ht 5' 9\" (1.753 m)   Wt 248 lb (112.5 kg)   SpO2 100%   BMI 36.62 kg/m²      well developed and well nourished          Labs:   Lab Results   Component Value Date/Time    WBC 5.5 08/06/2021 10:20 AM    HGB 13.5 08/06/2021 10:20 AM    HCT 42.6 08/06/2021 10:20 AM    PLATELET 703 03/69/5220 10:20 AM    MCV 98 (H) 08/06/2021 10:20 AM     Lab Results   Component Value Date/Time    Cholesterol, total 215 (H) 08/06/2021 10:20 AM    HDL Cholesterol 30 (L) 08/06/2021 10:20 AM    LDL, calculated 137 (H) 08/06/2021 10:20 AM    Triglyceride 236 (H) 08/06/2021 10:20 AM     Lab Results   Component Value Date/Time    Sodium 137 08/06/2021 10:20 AM    Potassium 4.6 08/06/2021 10:20 AM    Chloride 99 08/06/2021 10:20 AM    CO2 25 08/06/2021 10:20 AM    Anion gap 13.0 08/06/2021 10:20 AM    Glucose 99 08/06/2021 10:20 AM    BUN 12 08/06/2021 10:20 AM    Creatinine 1.2 08/06/2021 10:20 AM    BUN/Creatinine ratio 10 (L) 01/25/2021 12:31 PM    GFR est AA >60 01/25/2021 12:31 PM    GFR est non-AA >60 01/25/2021 12:31 PM    Calcium 9.7 08/06/2021 10:20 AM    Bilirubin, total 0.2 08/06/2021 10:20 AM    ALT (SGPT) 29 08/06/2021 10:20 AM    Alk. phosphatase 76 08/06/2021 10:20 AM    Protein, total 7.1 08/06/2021 10:20 AM    Albumin 4.5 08/06/2021 10:20 AM    Globulin 2.6 08/06/2021 10:20 AM    A-G Ratio 1.7 08/06/2021 10:20 AM          Assessment/Plan    ICD-10-CM ICD-9-CM    1. Attention deficit hyperactivity disorder (ADHD), combined type  F90.2 314.01  improving will increase to lisdexamfetamine (VYVANSE) 50 mg cap   2. Essential hypertension  I10 401.9  improved with weight loss. 3. Anxiety  F41.9 300.00  we will see if this improves with patient being able to focus better. If not consider medication such as BuSpar   4. Obstructive sleep apnea syndrome  G47.33 327.23  patient will follow up with his sleep specialist to start CPAP           Follow-up and Dispositions    · Return in about 4 weeks (around 11/30/2021) for ADD, Anxiety. Reviewed plan of care. Patient has provided input and agrees with goals.

## 2021-11-05 ENCOUNTER — TELEPHONE (OUTPATIENT)
Dept: INTERNAL MEDICINE CLINIC | Age: 36
End: 2021-11-05

## 2021-11-05 NOTE — TELEPHONE ENCOUNTER
VYVANSE - pt needs a prior auth.  Says he paid out of pocket at pharmacy today but does need the prior auth

## 2021-12-04 LAB — SARS-COV-2, NAA: NOT DETECTED

## 2021-12-06 DIAGNOSIS — F90.2 ATTENTION DEFICIT HYPERACTIVITY DISORDER (ADHD), COMBINED TYPE: Primary | ICD-10-CM

## 2021-12-06 NOTE — TELEPHONE ENCOUNTER
Patient is out of medication    [facility] as [facility]/21 for a 30 d/s. Last Visit: 11/2/21 with MD Martinez  Next Appointment: 12/13/21 with MD Martinez  Previous Refill Encounter(s): 11/2/21 #30    Requested Prescriptions     Pending Prescriptions Disp Refills    lisdexamfetamine (VYVANSE) 50 mg cap 30 Capsule 0     Sig: Take 1 Capsule by mouth daily. Max Daily Amount: 50 mg.

## 2022-01-09 ENCOUNTER — HOSPITAL ENCOUNTER (EMERGENCY)
Age: 37
Discharge: HOME | End: 2022-01-10
Attending: EMERGENCY MEDICINE
Payer: MEDICAID

## 2022-01-09 VITALS
BODY MASS INDEX: 33.04 KG/M2 | OXYGEN SATURATION: 97 % | RESPIRATION RATE: 22 BRPM | HEART RATE: 96 BPM | TEMPERATURE: 99.7 F | WEIGHT: 230.8 LBS | HEIGHT: 70 IN | SYSTOLIC BLOOD PRESSURE: 142 MMHG | DIASTOLIC BLOOD PRESSURE: 96 MMHG

## 2022-01-09 DIAGNOSIS — U07.1 COVID: Primary | ICD-10-CM

## 2022-01-09 PROCEDURE — 99282 EMERGENCY DEPT VISIT SF MDM: CPT

## 2022-01-09 PROCEDURE — U0003 INFECTIOUS AGENT DETECTION BY NUCLEIC ACID (DNA OR RNA); SEVERE ACUTE RESPIRATORY SYNDROME CORONAVIRUS 2 (SARS-COV-2) (CORONAVIRUS DISEASE [COVID-19]), AMPLIFIED PROBE TECHNIQUE, MAKING USE OF HIGH THROUGHPUT TECHNOLOGIES AS DESCRIBED BY CMS-2020-01-R: HCPCS

## 2022-01-09 PROCEDURE — 99283 EMERGENCY DEPT VISIT LOW MDM: CPT

## 2022-01-09 NOTE — Clinical Note
[address] [place] [address] [facility]  [address] [address] [phone]    Work/School Note    Date: 1/9/2022     To Whom It May concern:    Warden Ridley was evaluated by the following provider(s):  Attending Provider: Whitley Johns[address] [place] [address] virus is suspected. Per the CDC guidelines we recommend home isolation until the following conditions are all met:    1. At least five days have passed since symptoms first appeared and/or had a close exposure,   2. After home isolation for five days, wearing a mask around others for the next five days,  3. At least 24 have passed since last fever without the use of fever-reducing medications and  4.  Symptoms (eg cough, shortness of breath) have improved      Sincerely,          Markel Ahn MD

## 2022-01-09 NOTE — Clinical Note
[address] [place] [address] [facility]  [address] [address] [phone]    Work/School Note    Date: 1/9/2022     To Whom It May concern:    Savanna Molina was evaluated by the following provider(s):  Attending Provider: Mary Ellen Domingo [address] [place] [address] virus is suspected. Per the CDC guidelines we recommend home isolation until the following conditions are all met:    1. At least five days have passed since symptoms first appeared and/or had a close exposure,   2. After home isolation for five days, wearing a mask around others for the next five days,  3. At least 24 have passed since last fever without the use of fever-reducing medications and  4.  Symptoms (eg cough, shortness of breath) have improved      Sincerely,          Edilma Ellison MD

## 2022-01-10 LAB — SARS-COV-2, NAA: DETECTED

## 2022-01-10 NOTE — ED PROVIDER NOTES
Patient is a 79-year-old male with past medical history significant for allergies and ADHD, who presents to the ED today with cough, chills, body aches, headache, and chest pain from a cough. He denies ear pain, sore throat, nausea, vomiting or diarrhea. He also does not have a fever. He states that he did take Tylenol earlier. Past Medical History:   Diagnosis Date    Anemia     Condyloma acuminatum     Environmental allergies     Groin pain     Obstructive sleep apnea on CPAP     CATARINA (obstructive sleep apnea)     Plantar fasciitis     Sinusitis     Tinea pedis        Past Surgical History:   Procedure Laterality Date    HX OTHER SURGICAL      dental         Family History:   Problem Relation Age of Onset    Hypertension Mother     Diabetes Mother     Hypertension Father        Social History     Socioeconomic History    Marital status:      Spouse name: Not on file    Number of children: Not on file    Years of education: Not on file    Highest education level: Not on file   Occupational History    Not on file   Tobacco Use    Smoking status: Never Smoker    Smokeless tobacco: Never Used   Substance and Sexual Activity    Alcohol use: No    Drug use: No    Sexual activity: Not on file   Other Topics Concern    Not on file   Social History Narrative    Not on file     Social Determinants of Health     Financial Resource Strain:     Difficulty of Paying Living Expenses: Not on file   Food Insecurity:     Worried About Running Out of Food in the Last Year: Not on file    Maranda of Food in the Last Year: Not on file   Transportation Needs:     Lack of Transportation (Medical): Not on file    Lack of Transportation (Non-Medical):  Not on file   Physical Activity:     Days of Exercise per Week: Not on file    Minutes of Exercise per Session: Not on file   Stress:     Feeling of Stress : Not on file   Social Connections:     Frequency of Communication with Friends and Family: Not on file    Frequency of Social Gatherings with Friends and Family: Not on file    Attends Latter-day Services: Not on file    Active Member of Clubs or Organizations: Not on file    Attends Club or Organization Meetings: Not on file    Marital Status: Not on file   Intimate Partner Violence:     Fear of Current or Ex-Partner: Not on file    Emotionally Abused: Not on file    Physically Abused: Not on file    Sexually Abused: Not on file   Housing Stability:     Unable to Pay for Housing in the Last Year: Not on file    Number of [place] in the Last Year: Not on file    Unstable Housing in the Last Year: Not on file         ALLERGIES: Penicillins, Mold extracts, and Peanut    Review of Systems   All other systems reviewed and are negative. Vitals:    01/09/22 2118   BP: (!) 142/96   Pulse: 96   Resp: 22   Temp: 99.7 °F (37.6 °C)   SpO2: 97%   Weight: 104.7 kg (230 lb 12.8 oz)   Height: 5' 10\" (1.778 m)            Physical Exam  Constitutional:       Appearance: Normal appearance. HENT:      Head: Normocephalic and atraumatic. Right Ear: Tympanic membrane and external ear normal.      Left Ear: Tympanic membrane and external ear normal.      Ears:      Comments: Erythema bilaterally of the ear canals. Nose: Nose normal.      Mouth/Throat:      Mouth: Mucous membranes are moist.      Pharynx: Oropharynx is clear. Eyes:      Extraocular Movements: Extraocular movements intact. Conjunctiva/sclera: Conjunctivae normal.      Pupils: Pupils are equal, round, and reactive to light. Cardiovascular:      Rate and Rhythm: Normal rate and regular rhythm. Pulses: Normal pulses. Heart sounds: Normal heart sounds. Pulmonary:      Effort: Pulmonary effort is normal.      Breath sounds: Normal breath sounds. Abdominal:      General: Abdomen is flat. Bowel sounds are normal.      Palpations: Abdomen is soft. Musculoskeletal:         General: Normal range of motion. Cervical back: Normal range of motion and neck supple. Skin:     General: Skin is warm and dry. Capillary Refill: Capillary refill takes less than 2 seconds. Neurological:      General: No focal deficit present. Mental Status: He is alert. Psychiatric:         Mood and Affect: Mood normal.         Behavior: Behavior normal.         Thought Content: Thought content normal.         Judgment: Judgment normal.        Recent Results (from the past 12 hour(s))   SARS-COV-2    Collection Time: 01/09/22  9:26 PM   Result Value Ref Range    SARS-CoV-2 Please find results under separate order         MDM  Number of Diagnoses or Management Options  Diagnosis management comments: The patient is a 77-year-old male with past medical history significant for environmental allergies and ADHD, who presents to the ED today with cough, chills, headache, chest pain from his cough, and body aches. He most likely has COVID. He is overall well-appearing and is not hypoxic. He will be discharged home and advised to treat his symptoms with Mucinex, Motrin and Tylenol. He will be advised to follow-up with his primary care physician in 2 to 3 days. Return precautions have been given.          Procedures

## 2022-01-10 NOTE — ED TRIAGE NOTES
Patient started with symptoms this morning. Patient complains of body aches, weakness, cough and chills. Patient took tylenol 3 hours ago.

## 2022-01-10 NOTE — ROUTINE PROCESS
Rigo Orellana is a 31 y.o. male that was discharged in stable. Pt was accompanied by self. Pt is driving. The patients diagnosis, condition and treatment were explained to  patient and aftercare instructions were given. The patient verbalized understanding. Patient armband removed and shredded.

## 2022-01-11 ENCOUNTER — PATIENT OUTREACH (OUTPATIENT)
Dept: CASE MANAGEMENT | Age: 37
End: 2022-01-11

## 2022-01-11 NOTE — PROGRESS NOTES
[facility]". Discussed COVID-19 related testing which was available at this time. Test results were positive. Patient informed of results, if available? yes     Ambulatory Care Manager contacted the patient by telephone to perform post discharge assessment. Call within 2 business days of discharge: Yes Verified name and  with patient as identifiers. Provided introduction to self, and explanation of the CTN/ACM role, and reason for call due to risk factors for infection and/or exposure to COVID-19. Symptoms reviewed with patient who verbalized the following symptoms: pain or aching joints, cough, chills or shaking and chest pain      Due to no new or worsening symptoms encounter was not routed to provider for escalation. Discussed follow-up appointments. If no appointment was previously scheduled, appointment scheduling offered:  Pt f/u w/ his pcp   [facility] follow up appointment(s): No future appointments. Non-[facility] follow up appointment(s):      Advance Care Planning:   Does patient have an Advance Directive:  not on file. Patient has following risk factors of: no known risk factors. ACM reviewed discharge instructions, medical action plan and red flags such as increased shortness of breath, increasing fever and signs of decompensation with patient who verbalized understanding. Discussed exposure protocols and quarantine with CDC Guidelines What to do if you are sick with coronavirus disease .  patient was given an opportunity for questions and concerns. The patient agrees to contact the [facility] exposure line [phone], [facility] [address]  [phone] and PCP office for questions related to their healthcare. ACM provided contact information for future needs. Reviewed and educated patient on any new and changed medications related to discharge diagnosis     Was patient discharged with a pulse oximeter?  no Discussed and confirmed pulse oximeter discharge instructions and when to notify provider or seek emergency care. Plan for follow-up call in 5-7 days based on severity of symptoms and risk factors.

## 2022-01-18 ENCOUNTER — TELEPHONE (OUTPATIENT)
Dept: INTERNAL MEDICINE CLINIC | Age: 37
End: 2022-01-18

## 2022-01-18 ENCOUNTER — PATIENT OUTREACH (OUTPATIENT)
Dept: CASE MANAGEMENT | Age: 37
End: 2022-01-18

## 2022-01-18 DIAGNOSIS — R05.9 COUGH: Primary | ICD-10-CM

## 2022-01-18 DIAGNOSIS — F90.2 ATTENTION DEFICIT HYPERACTIVITY DISORDER (ADHD), COMBINED TYPE: ICD-10-CM

## 2022-01-18 RX ORDER — PREDNISONE 10 MG/1
TABLET ORAL
Qty: 25 TABLET | Refills: 0 | Status: SHIPPED | OUTPATIENT
Start: 2022-01-18 | End: 2022-03-14

## 2022-01-18 RX ORDER — CODEINE PHOSPHATE AND GUAIFENESIN 10; 100 MG/5ML; MG/5ML
10 SOLUTION ORAL
Qty: 120 ML | Refills: 0 | Status: SHIPPED | OUTPATIENT
Start: 2022-01-18 | End: 2022-01-25

## 2022-01-18 NOTE — TELEPHONE ENCOUNTER
Pt asking for virtual appt today or tomorrow for covid positive, seen at [facility] ED 01/09/22 and DX positive a few days later.     Stated current symptoms of:  Rash and itching, chills that come and go, body aches and pains, cough     Pharmacy is [facility]

## 2022-01-18 NOTE — TELEPHONE ENCOUNTER
[facility]se as 12/6/21 for a 30 d/s. UDS done 9/13/21    Last Visit: 11/2/21 with MD Martinez  Next Appointment: none  Previous Refill Encounter(s): 12/6/21 #30    Requested Prescriptions     Pending Prescriptions Disp Refills    lisdexamfetamine (VYVANSE) 50 mg cap 30 Capsule 0     Sig: Take 1 Capsule by mouth daily. Max Daily Amount: 50 mg.

## 2022-01-18 NOTE — TELEPHONE ENCOUNTER
Cough med and prednisone sent in. Can use Tylenol for fever or chills. Can schedule VV if not improving. If symptoms get worst should go to ER.

## 2022-01-18 NOTE — PROGRESS NOTES
Follow Up Call    Challenges to be reviewed by the provider   Additional needs identified to be addressed with provider: no  none           Encounter was not routed to provider for escalation. Method of communication with provider: none. Contacted the patient by telephone to follow up after ED. Status: improved  Interventions to address identified needs: Obtained and reviewed discharge summary and/or continuity of care documents and Education of patient/family/caregiver/guardian to support self-management-     [facility] follow up appointment(s): No future appointments. Non-[facility] follow up appointment(s):   Follow up appointment completed? Pt will call to set up a f/u w/ his PCP. Provided contact information for future needs. Plan for follow-up call in 5-7 days based on severity of symptoms and risk factors.   Plan for next call: symptom management- , self management-  and follow up appointment-      Lisy Naranjo RN

## 2022-01-25 ENCOUNTER — PATIENT OUTREACH (OUTPATIENT)
Dept: CASE MANAGEMENT | Age: 37
End: 2022-01-25

## 2022-01-25 NOTE — PROGRESS NOTES
Patient resolved from [address] Transitions episode on 1/25/22. Patient/family has been provided the following resources and education related to COVID-19:                         Signs, symptoms and red flags related to COVID-19            CDC exposure and quarantine guidelines            [facility] exposure contact - [phone]            Contact for their local Department of Health                 Patient currently reports that their sx have improved or resolved. No further outreach scheduled with this CTN/ACM. Episode of Care resolved. Patient has this CTN/ACM contact information if future needs arise.

## 2022-03-01 RX ORDER — MONTELUKAST SODIUM 10 MG/1
TABLET ORAL
Qty: 90 TABLET | Refills: 1 | Status: SHIPPED | OUTPATIENT
Start: 2022-03-01 | End: 2022-03-14 | Stop reason: SDUPTHER

## 2022-03-14 ENCOUNTER — OFFICE VISIT (OUTPATIENT)
Dept: INTERNAL MEDICINE CLINIC | Age: 37
End: 2022-03-14
Payer: COMMERCIAL

## 2022-03-14 VITALS
DIASTOLIC BLOOD PRESSURE: 103 MMHG | HEIGHT: 70 IN | TEMPERATURE: 97.4 F | RESPIRATION RATE: 20 BRPM | BODY MASS INDEX: 31.92 KG/M2 | HEART RATE: 77 BPM | OXYGEN SATURATION: 98 % | WEIGHT: 223 LBS | SYSTOLIC BLOOD PRESSURE: 141 MMHG

## 2022-03-14 DIAGNOSIS — G47.33 OBSTRUCTIVE SLEEP APNEA SYNDROME: ICD-10-CM

## 2022-03-14 DIAGNOSIS — I10 ESSENTIAL HYPERTENSION: Primary | ICD-10-CM

## 2022-03-14 DIAGNOSIS — F90.2 ATTENTION DEFICIT HYPERACTIVITY DISORDER (ADHD), COMBINED TYPE: ICD-10-CM

## 2022-03-14 DIAGNOSIS — F41.9 ANXIETY: ICD-10-CM

## 2022-03-14 DIAGNOSIS — J30.89 ENVIRONMENTAL AND SEASONAL ALLERGIES: ICD-10-CM

## 2022-03-14 DIAGNOSIS — M79.642 LEFT HAND PAIN: ICD-10-CM

## 2022-03-14 PROCEDURE — 99214 OFFICE O/P EST MOD 30 MIN: CPT | Performed by: INTERNAL MEDICINE

## 2022-03-14 RX ORDER — MONTELUKAST SODIUM 10 MG/1
10 TABLET ORAL DAILY
Qty: 90 TABLET | Refills: 1 | Status: SHIPPED | OUTPATIENT
Start: 2022-03-14

## 2022-03-14 RX ORDER — LISINOPRIL AND HYDROCHLOROTHIAZIDE 10; 12.5 MG/1; MG/1
1 TABLET ORAL DAILY
Qty: 90 TABLET | Refills: 1 | Status: SHIPPED | OUTPATIENT
Start: 2022-03-14 | End: 2022-08-05

## 2022-03-14 RX ORDER — FLUTICASONE PROPIONATE 50 MCG
2 SPRAY, SUSPENSION (ML) NASAL
Qty: 1 EACH | Refills: 5 | Status: SHIPPED | OUTPATIENT
Start: 2022-03-14

## 2022-03-14 NOTE — PROGRESS NOTES
Patient is in the office today for a 4 week follow up. 1. \"Have you been to the ER, urgent care clinic since your last visit? Hospitalized since your last visit? \" yes, [facility] for elevated blood pressure. 2. \"Have you seen or consulted any other health care providers outside of the [address] [place] [address] since your last visit? \" No     3. For patients aged 39-70: Has the patient had a colonoscopy / FIT/ Cologuard? NA - based on age      If the patient is female:    4. For patients aged 41-77: Has the patient had a mammogram within the past 2 years? NA - based on age or sex      11. For patients aged 21-65: Has the patient had a pap smear?  NA - based on age or sex

## 2022-03-14 NOTE — PATIENT INSTRUCTIONS
DASH Diet: Care Instructions  Your Care Instructions     The DASH diet is an eating plan that can help lower your blood pressure. DASH stands for Dietary Approaches to Stop Hypertension. Hypertension is high blood pressure. The DASH diet focuses on eating foods that are high in calcium, potassium, and magnesium. These nutrients can lower blood pressure. The foods that are highest in these nutrients are fruits, vegetables, low-fat dairy products, nuts, seeds, and legumes. But taking calcium, potassium, and magnesium supplements instead of eating foods that are high in those nutrients does not have the same effect. The DASH diet also includes whole grains, fish, and poultry. The DASH diet is one of several lifestyle changes your doctor may recommend to lower your high blood pressure. Your doctor may also want you to decrease the amount of sodium in your diet. Lowering sodium while following the DASH diet can lower blood pressure even further than just the DASH diet alone. Follow-up care is a key part of your treatment and safety. Be sure to make and go to all appointments, and call your doctor if you are having problems. It's also a good idea to know your test results and keep a list of the medicines you take. How can you care for yourself at home? Following the DASH diet  · Eat 4 to 5 servings of fruit each day. A serving is 1 medium-sized piece of fruit, ½ cup chopped or canned fruit, 1/4 cup dried fruit, or 4 ounces (½ cup) of fruit juice. Choose fruit more often than fruit juice. · Eat 4 to 5 servings of vegetables each day. A serving is 1 cup of lettuce or raw leafy vegetables, ½ cup of chopped or cooked vegetables, or 4 ounces (½ cup) of vegetable juice. Choose vegetables more often than vegetable juice. · Get 2 to 3 servings of low-fat and fat-free dairy each day. A serving is 8 ounces of milk, 1 cup of yogurt, or 1 ½ ounces of cheese. · Eat 6 to 8 servings of grains each day.  A serving is 1 slice of bread, 1 ounce of dry cereal, or ½ cup of cooked rice, pasta, or cooked cereal. Try to choose whole-grain products as much as possible. · Limit lean meat, poultry, and fish to 2 servings each day. A serving is 3 ounces, about the size of a deck of cards. · Eat 4 to 5 servings of nuts, seeds, and legumes (cooked dried beans, lentils, and split peas) each week. A serving is 1/3 cup of nuts, 2 tablespoons of seeds, or ½ cup of cooked beans or peas. · Limit fats and oils to 2 to 3 servings each day. A serving is 1 teaspoon of vegetable oil or 2 tablespoons of salad dressing. · Limit sweets and added sugars to 5 servings or less a week. A serving is 1 tablespoon jelly or jam, ½ cup sorbet, or 1 cup of lemonade. · Eat less than 2,300 milligrams (mg) of sodium a day. If you limit your sodium to 1,500 mg a day, you can lower your blood pressure even more. · Be aware that all of these are the suggested number of servings for people who eat 1,800 to 2,000 calories a day. Your recommended number of servings may be different if you need more or fewer calories. Tips for success  · Start small. Do not try to make dramatic changes to your diet all at once. You might feel that you are missing out on your favorite foods and then be more likely to not follow the plan. Make small changes, and stick with them. Once those changes become habit, add a few more changes. · Try some of the following:  ? Make it a goal to eat a fruit or vegetable at every meal and at snacks. This will make it easy to get the recommended amount of fruits and vegetables each day. ? Try yogurt topped with fruit and nuts for a snack or healthy dessert. ? Add lettuce, tomato, cucumber, and onion to sandwiches. ? Combine a ready-made pizza crust with low-fat mozzarella cheese and lots of vegetable toppings. Try using tomatoes, squash, spinach, broccoli, carrots, cauliflower, and onions. ?  Have a variety of cut-up vegetables with a low-fat dip as an appetizer instead of chips and dip. ? Sprinkle sunflower seeds or chopped almonds over salads. Or try adding chopped walnuts or almonds to cooked vegetables. ? Try some vegetarian meals using beans and peas. Add garbanzo or kidney beans to salads. Make burritos and tacos with mashed jacinto beans or black beans. Where can you learn more? Go to http://www.[employer].com/  Enter H967 in the search box to learn more about \"DASH Diet: Care Instructions. \"  Current as of: January 10, 2022               Content Version: 13.2  © 9778-8218 [employer]. Care instructions adapted under license by [employer] (which disclaims liability or warranty for this information). If you have questions about a medical condition or this instruction, always ask your healthcare professional. [address] any warranty or liability for your use of this information.

## 2022-03-18 PROBLEM — E78.5 DYSLIPIDEMIA: Status: ACTIVE | Noted: 2017-06-05

## 2022-03-18 PROBLEM — E66.01 SEVERE OBESITY (HCC): Status: ACTIVE | Noted: 2018-11-29

## 2022-03-19 PROBLEM — E66.9 OBESITY (BMI 30.0-34.9): Status: ACTIVE | Noted: 2017-06-05

## 2022-03-19 PROBLEM — E66.811 OBESITY (BMI 30.0-34.9): Status: ACTIVE | Noted: 2017-06-05

## 2022-03-19 PROBLEM — G47.33 OBSTRUCTIVE SLEEP APNEA: Status: ACTIVE | Noted: 2017-06-05

## 2022-03-19 PROBLEM — Z79.899 ENCOUNTER FOR LONG-TERM (CURRENT) USE OF MEDICATIONS: Status: ACTIVE | Noted: 2017-06-05

## 2022-03-19 NOTE — PROGRESS NOTES
Flavia Reardon is a 31 y.o.  male and presents with Attention Deficit Disorder, Anxiety (f/u), Hand Pain (Left), Sleep Apnea, and Hypertension      SUBJECTIVE:    Patient follows up for attention deficit disorder which has improved on Vyvanse 50 mg daily. Pt is currently seeing a therapist for his anxiety and depression and he is doing better off medication and would like to stay off medication for now since SSRIs do not seem to work for him. Patient's high blood pressure remains uncontrolled currently off of medications. Patient understands that he probably needs to restart medication since his blood pressure remains uncontrolled with diet exercise and weight loss. Patient does have sleep apnea and is encouraged to use his CPAP machine on a regular basis which may help his blood pressure. Patient is complaining of chronic left hand pain which she has had for the last few months. It is exacerbated when he has to do any kind of activity where he has to use his hand on a regular basis. Respiratory ROS: negative for - shortness of breath  Cardiovascular ROS: negative for - chest pain    Current Outpatient Medications   Medication Sig    montelukast (SINGULAIR) 10 mg tablet Take 1 Tablet by mouth daily.  lisdexamfetamine (VYVANSE) 50 mg cap Take 1 Capsule by mouth daily. Max Daily Amount: 50 mg.    fluticasone propionate (Flonase Allergy Relief) 50 mcg/actuation nasal spray 2 Sprays by Both Nostrils route daily as needed for Rhinitis or Allergies.  lisinopril-hydroCHLOROthiazide (PRINZIDE, ZESTORETIC) 10-12.5 mg per tablet Take 1 Tablet by mouth daily.  levocetirizine (Xyzal) 5 mg tablet Take  by mouth.  OTHER Take 6 Capsules by mouth daily. Grassfed Beef Liver (Patient not taking: Reported on 9/9/2021)    OTHER 3 UNSPECIFIED. Organic Men's Multi vit 3 gummies seferino (Patient not taking: Reported on 9/9/2021)    ibuprofen (MOTRIN) 800 mg tablet Take  by mouth.  (Patient not taking: Reported on 9/9/2021)    OTHER Men's multivitamin (Patient not taking: Reported on 9/9/2021)    OTHER Iron 18mg po daily (Patient not taking: Reported on 9/9/2021)    multivitamin, tx-iron-ca-min (THERA-M w/ IRON) 9 mg iron-400 mcg tab tablet Take 1 Tablet by mouth daily. (Patient not taking: Reported on 6/29/2021)    sodium chloride (Saline Nasal Mist) 0.65 % nasal squeeze bottle 0.05 mL by Both Nostrils route as needed for Congestion. (Patient not taking: Reported on 6/17/2021)    clobetasoL (TEMOVATE) 0.05 % ointment APPLY SPARINGLY TO AFFECTED AREA(S) TWICE DAILY DO NOT USE ON FACE OR GROIN (Patient not taking: Reported on 6/17/2021)    cholecalciferol (VITAMIN D3) (50,000 UNITS /1250 MCG) capsule TAKE ONE CAPSULE BY MOUTH EACH WEEK (Patient not taking: Reported on 6/17/2021)    cyclobenzaprine (FLEXERIL) 10 mg tablet Take 1 Tab by mouth nightly as needed for Muscle Spasm(s). Indications: muscle spasm (Patient not taking: Reported on 6/17/2021)    ergocalciferol (ERGOCALCIFEROL) 50,000 unit capsule Take 1 Cap by mouth every seven (7) days. (Patient not taking: Reported on 6/17/2021)     No current facility-administered medications for this visit. OBJECTIVE:  alert, well appearing, and in no distress. Patient appears depressed.   Visit Vitals  BP (!) 141/103 (BP 1 Location: Right arm, BP Patient Position: Sitting, BP Cuff Size: Adult)   Pulse 77   Temp 97.4 °F (36.3 °C) (Temporal)   Resp 20   Ht 5' 10\" (1.778 m)   Wt 223 lb (101.2 kg)   SpO2 98%   BMI 32.00 kg/m²      well developed and well nourished          Labs:   Lab Results   Component Value Date/Time    WBC 5.5 08/06/2021 10:20 AM    HGB 13.5 08/06/2021 10:20 AM    HCT 42.6 08/06/2021 10:20 AM    PLATELET 719 16/03/0479 10:20 AM    MCV 98 (H) 08/06/2021 10:20 AM     Lab Results   Component Value Date/Time    Cholesterol, total 215 (H) 08/06/2021 10:20 AM    HDL Cholesterol 30 (L) 08/06/2021 10:20 AM    LDL, calculated 137 (H) 08/06/2021 10:20 AM    Triglyceride 236 (H) 08/06/2021 10:20 AM     Lab Results   Component Value Date/Time    Sodium 137 08/06/2021 10:20 AM    Potassium 4.6 08/06/2021 10:20 AM    Chloride 99 08/06/2021 10:20 AM    CO2 25 08/06/2021 10:20 AM    Anion gap 13.0 08/06/2021 10:20 AM    Glucose 99 08/06/2021 10:20 AM    BUN 12 08/06/2021 10:20 AM    Creatinine 1.2 08/06/2021 10:20 AM    BUN/Creatinine ratio 10 (L) 01/25/2021 12:31 PM    GFR est AA >60 01/25/2021 12:31 PM    GFR est non-AA >60 01/25/2021 12:31 PM    Calcium 9.7 08/06/2021 10:20 AM    Bilirubin, total 0.2 08/06/2021 10:20 AM    ALT (SGPT) 29 08/06/2021 10:20 AM    Alk. phosphatase 76 08/06/2021 10:20 AM    Protein, total 7.1 08/06/2021 10:20 AM    Albumin 4.5 08/06/2021 10:20 AM    Globulin 2.6 08/06/2021 10:20 AM    A-G Ratio 1.7 08/06/2021 10:20 AM          Assessment/Plan    ICD-10-CM ICD-9-CM    1. Essential hypertension  I10 401.9 Uncontrolled. Will restart lisinopril-hydroCHLOROthiazide (PRINZIDE, ZESTORETIC) 10-12.5 mg per tablet   2. Attention deficit hyperactivity disorder (ADHD), combined type  F90.2 314.01 Pt doing well on lisdexamfetamine (VYVANSE) 50 mg cap   3. Anxiety  F41.9 300.00  patient doing better with therapy   4. Environmental and seasonal allergies  J30.89 477.8 fluticasone propionate (Flonase Allergy Relief) 50 mcg/actuation nasal spray   5. Left hand pain  M79.642 729.5 REFERRAL TO ORTHOPEDICS   6. Obstructive sleep apnea syndrome  G47.33 327.23  patient encouraged to use his CPAP machine on a regular basis          Follow-up and Dispositions    · Return in about 4 weeks (around 4/11/2022) for BP check. Reviewed plan of care. Patient has provided input and agrees with goals.

## 2022-03-31 ENCOUNTER — VIRTUAL VISIT (OUTPATIENT)
Dept: INTERNAL MEDICINE CLINIC | Age: 37
End: 2022-03-31
Payer: COMMERCIAL

## 2022-03-31 DIAGNOSIS — J30.1 SEASONAL ALLERGIC RHINITIS DUE TO POLLEN: ICD-10-CM

## 2022-03-31 DIAGNOSIS — J01.90 ACUTE NON-RECURRENT SINUSITIS, UNSPECIFIED LOCATION: Primary | ICD-10-CM

## 2022-03-31 PROCEDURE — 99441 PR PHYS/QHP TELEPHONE EVALUATION 5-10 MIN: CPT | Performed by: INTERNAL MEDICINE

## 2022-03-31 RX ORDER — AZITHROMYCIN 250 MG/1
TABLET, FILM COATED ORAL
Qty: 6 TABLET | Refills: 0 | Status: SHIPPED | OUTPATIENT
Start: 2022-03-31 | End: 2022-04-05

## 2022-03-31 RX ORDER — PREDNISONE 10 MG/1
TABLET ORAL
Qty: 21 TABLET | Refills: 0 | Status: SHIPPED | OUTPATIENT
Start: 2022-03-31 | End: 2022-05-25 | Stop reason: SDUPTHER

## 2022-03-31 NOTE — PROGRESS NOTES
Nancy Armendariz is a 31 y.o. male, evaluated via audio-only technology on 3/31/2022 for Sinus Infection and Sleep Apnea (on CPAP)  . Assessment & Plan:   Diagnoses and all orders for this visit:    1. Acute non-recurrent sinusitis, unspecified location  -     azithromycin (ZITHROMAX) 250 mg tablet; Take 2 tablets today, then take 1 tablet daily  -     predniSONE (STERAPRED DS) 10 mg dose pack; See administration instruction per 10mg dose pack    2. Seasonal allergic rhinitis due to pollen  -     predniSONE (STERAPRED DS) 10 mg dose pack; See administration instruction per 10mg dose pack        12  Subjective:   Sinus Pain  Patient complains of facial pain, nasal congestion, no  fever, post nasal drip and purulent nasal discharge. Symptoms include non productive cough with no fever, chills, or night sweats. Onset of symptoms was 2 weeks ago, unchanged since that time. He is using Flonase and Singulair and Xyzal and sinus wash with mildimprovment . Past history is significant for chronic allergic rhinitis . Patient is non-smoker. Pt having issues with CPAP with dry mouth while he deals with sinus issues. Prior to Admission medications    Medication Sig Start Date End Date Taking? Authorizing Provider   azithromycin (ZITHROMAX) 250 mg tablet Take 2 tablets today, then take 1 tablet daily 3/31/22 4/5/22 Yes Amada Martinez MD   predniSONE (STERAPRED DS) 10 mg dose pack See administration instruction per 10mg dose pack 3/31/22  Yes Amada Martinez MD   montelukast (SINGULAIR) 10 mg tablet Take 1 Tablet by mouth daily. 3/14/22   Amada Martinez MD   lisdexamfetamine (VYVANSE) 50 mg cap Take 1 Capsule by mouth daily. Max Daily Amount: 50 mg. 3/14/22   Brittany Molina MD   fluticasone propionate (Flonase Allergy Relief) 50 mcg/actuation nasal spray 2 Sprays by Both Nostrils route daily as needed for Rhinitis or Allergies.  3/14/22   Brittany Molina MD   lisinopril-hydroCHLOROthiazide (Guero Trammell) 10-12.5 mg per tablet Take 1 Tablet by mouth daily. 3/14/22   Sonam Hummel MD   OTHER Take 6 Capsules by mouth daily. Grassfed Beef Liver  Patient not taking: Reported on 9/9/2021    Provider, Historical   OTHER 3 UNSPECIFIED. Organic Men's Multi vit 3 gummies seferino  Patient not taking: Reported on 9/9/2021    Provider, Historical   ibuprofen (MOTRIN) 800 mg tablet Take  by mouth. Patient not taking: Reported on 9/9/2021    Provider, Historical   OTHER Men's multivitamin  Patient not taking: Reported on 9/9/2021    Other, MD Melanie   OTHER Iron 18mg po daily  Patient not taking: Reported on 9/9/2021    Erendira, MD Melanie   multivitamin, tx-iron-ca-min (THERA-M w/ IRON) 9 mg iron-400 mcg tab tablet Take 1 Tablet by mouth daily. Patient not taking: Reported on 6/29/2021    Erendira, MD Melanie   levocetirizine (Xyzal) 5 mg tablet Take  by mouth. Provider, Historical   sodium chloride (Saline Nasal Mist) 0.65 % nasal squeeze bottle 0.05 mL by Both Nostrils route as needed for Congestion. Patient not taking: Reported on 6/17/2021 4/9/21   Xuan Kohler DNP   clobetasoL (TEMOVATE) 0.05 % ointment APPLY SPARINGLY TO AFFECTED AREA(S) TWICE DAILY DO NOT USE ON FACE OR GROIN  Patient not taking: Reported on 6/17/2021 11/19/20   Provider, Historical   cholecalciferol (VITAMIN D3) (50,000 UNITS /1250 MCG) capsule TAKE ONE CAPSULE BY MOUTH [address] [place] [address]  Patient not taking: Reported on 6/17/2021 1/25/21   Provider, Historical   cyclobenzaprine (FLEXERIL) 10 mg tablet Take 1 Tab by mouth nightly as needed for Muscle Spasm(s). Indications: muscle spasm  Patient not taking: Reported on 6/17/2021 2/3/21   Sonam Hummel MD   ergocalciferol (ERGOCALCIFEROL) 50,000 unit capsule Take 1 Cap by mouth every seven (7) days.   Patient not taking: Reported on 6/17/2021 12/23/19   Sonam Hummel MD     Patient Active Problem List   Diagnosis Code    Fatigue R53.83    Allergic rhinitis J30.9    Fungal infection of left foot B35.3    Insomnia G47.00    Headache, unspecified headache type R51.9    Nausea R11.0    Vertigo R42    Vitamin D deficiency E55.9    Great toe pain M79.676    Depression F32. A    Snoring R06.83    Shift work sleep disorder G47.26    Obesity (BMI 30.0-34. 9) E66.9    Dyslipidemia E78.5    Encounter for long-term (current) use of medications Z79.899    Obstructive sleep apnea G47.33    Severe obesity (HCC) E66.01       ROS    No data recorded     Higinio Webb was evaluated through a patient-initiated, synchronous (real-time) audio only encounter. He (or guardian if applicable) is aware that it is a billable service, which includes applicable co-pays, with coverage as determined by his insurance carrier. This visit was conducted with the patient's (and/or Nadia Esquivel guardian's) verbal consent. He has not had a related appointment within my department in the past 7 days or scheduled within the next 24 hours. The patient was located in a state where the provider was licensed to provide care.     Total Time: minutes: 5-10 minutes    Tayler Irizarry MD

## 2022-04-21 DIAGNOSIS — F90.2 ATTENTION DEFICIT HYPERACTIVITY DISORDER (ADHD), COMBINED TYPE: ICD-10-CM

## 2022-04-21 NOTE — TELEPHONE ENCOUNTER
[facility]  reports the last fill date for Vyvanse as 3/14/22 for a 30 d/s. Last Visit: 3/31/22 with MD Martinez  Next Appointment: none  Previous Refill Encounter(s): 3/14/22 #30    Requested Prescriptions     Pending Prescriptions Disp Refills    lisdexamfetamine (VYVANSE) 50 mg cap 30 Capsule 0     Sig: Take 1 Capsule by mouth daily. Max Daily Amount: 50 mg.

## 2022-05-18 ENCOUNTER — HOSPITAL ENCOUNTER (EMERGENCY)
Age: 37
Discharge: HOME | End: 2022-05-18
Attending: EMERGENCY MEDICINE
Payer: COMMERCIAL

## 2022-05-18 VITALS
OXYGEN SATURATION: 100 % | SYSTOLIC BLOOD PRESSURE: 127 MMHG | RESPIRATION RATE: 17 BRPM | HEART RATE: 84 BPM | HEIGHT: 69 IN | DIASTOLIC BLOOD PRESSURE: 88 MMHG | TEMPERATURE: 98.6 F | BODY MASS INDEX: 32.58 KG/M2 | WEIGHT: 220 LBS

## 2022-05-18 DIAGNOSIS — M62.838 MUSCLE SPASM: Primary | ICD-10-CM

## 2022-05-18 LAB
ANION GAP SERPL CALC-SCNC: 3 MMOL/L (ref 3–18)
BUN SERPL-MCNC: 11 MG/DL (ref 7–18)
BUN/CREAT SERPL: 10 (ref 12–20)
CALCIUM SERPL-MCNC: 8.9 MG/DL (ref 8.5–10.1)
CHLORIDE SERPL-SCNC: 106 MMOL/L (ref 100–111)
CO2 SERPL-SCNC: 33 MMOL/L (ref 21–32)
CREAT SERPL-MCNC: 1.11 MG/DL (ref 0.6–1.3)
GLUCOSE SERPL-MCNC: 115 MG/DL (ref 74–99)
MAGNESIUM SERPL-MCNC: 2.2 MG/DL (ref 1.6–2.6)
POTASSIUM SERPL-SCNC: 3.7 MMOL/L (ref 3.5–5.5)
SODIUM SERPL-SCNC: 142 MMOL/L (ref 136–145)

## 2022-05-18 PROCEDURE — 83735 ASSAY OF MAGNESIUM: CPT

## 2022-05-18 PROCEDURE — 99283 EMERGENCY DEPT VISIT LOW MDM: CPT

## 2022-05-18 PROCEDURE — 74011250637 HC RX REV CODE- 250/637: Performed by: EMERGENCY MEDICINE

## 2022-05-18 PROCEDURE — 80048 BASIC METABOLIC PNL TOTAL CA: CPT

## 2022-05-18 RX ORDER — IBUPROFEN 400 MG/1
800 TABLET ORAL ONCE
Status: COMPLETED | OUTPATIENT
Start: 2022-05-18 | End: 2022-05-18

## 2022-05-18 RX ORDER — CYCLOBENZAPRINE HCL 10 MG
10 TABLET ORAL
Status: COMPLETED | OUTPATIENT
Start: 2022-05-18 | End: 2022-05-18

## 2022-05-18 RX ORDER — CYCLOBENZAPRINE HCL 5 MG
10 TABLET ORAL 3 TIMES DAILY
Qty: 9 TABLET | Refills: 0 | Status: SHIPPED | OUTPATIENT
Start: 2022-05-18 | End: 2022-05-25 | Stop reason: ALTCHOICE

## 2022-05-18 RX ADMIN — IBUPROFEN 800 MG: 400 TABLET, FILM COATED ORAL at 08:00

## 2022-05-18 RX ADMIN — CYCLOBENZAPRINE 10 MG: 10 TABLET, FILM COATED ORAL at 08:00

## 2022-05-18 NOTE — ED PROVIDER NOTES
EMERGENCY DEPARTMENT HISTORY AND PHYSICAL EXAM    7:48 AM patient seen at this time in room 8      Date: 5/18/2022  Patient Name: Robles Rodriguez    History of Presenting Illness     Chief Complaint   Patient presents with    Groin Pain         History Provided By: patient    Additional History (Context): Robles Rodriguez is a 31 y.o. male presents with ongoing problems for several months, upper back pain lower back pain right-sided sciatica all of which is resolved at this point his acute complaint is about 1 month of spasms in his hip flexor muscles, worse with movement. He does have a physical job. He is tried stretching at home. Has not recently contacted his primary doctor but does have primary care. Over the course of the past year for his various pains he has tried Flexeril ibuprofen and methocarbamol. Vinicius Farmer PCP: Crystal Angel MD    Chief Complaint:   Duration:    Timing:    Location:   Quality:   Severity:   Modifying Factors:   Associated Symptoms:       Current Outpatient Medications   Medication Sig Dispense Refill    cyclobenzaprine (FLEXERIL) 5 mg tablet Take 2 Tablets by mouth three (3) times daily. 9 Tablet 0    lisdexamfetamine (VYVANSE) 50 mg cap Take 1 Capsule by mouth daily. Max Daily Amount: 50 mg. 30 Capsule 0    predniSONE (STERAPRED DS) 10 mg dose pack See administration instruction per 10mg dose pack 21 Tablet 0    montelukast (SINGULAIR) 10 mg tablet Take 1 Tablet by mouth daily. 90 Tablet 1    fluticasone propionate (Flonase Allergy Relief) 50 mcg/actuation nasal spray 2 Sprays by Both Nostrils route daily as needed for Rhinitis or Allergies. 1 Each 5    lisinopril-hydroCHLOROthiazide (PRINZIDE, ZESTORETIC) 10-12.5 mg per tablet Take 1 Tablet by mouth daily. 90 Tablet 1    OTHER Take 6 Capsules by mouth daily. Grassfed Beef Liver (Patient not taking: Reported on 9/9/2021)      OTHER 3 UNSPECIFIED.  Organic Men's Multi vit 3 gummies seferino (Patient not taking: Reported on 9/9/2021)      ibuprofen (MOTRIN) 800 mg tablet Take  by mouth. (Patient not taking: Reported on 9/9/2021)      OTHER Men's multivitamin (Patient not taking: Reported on 9/9/2021)      OTHER Iron 18mg po daily (Patient not taking: Reported on 9/9/2021)      multivitamin, tx-iron-ca-min (THERA-M w/ IRON) 9 mg iron-400 mcg tab tablet Take 1 Tablet by mouth daily. (Patient not taking: Reported on 6/29/2021)      levocetirizine (Xyzal) 5 mg tablet Take  by mouth.  sodium chloride (Saline Nasal Mist) 0.65 % nasal squeeze bottle 0.05 mL by Both Nostrils route as needed for Congestion. (Patient not taking: Reported on 6/17/2021) 30 mL 0    clobetasoL (TEMOVATE) 0.05 % ointment APPLY SPARINGLY TO AFFECTED AREA(S) TWICE DAILY DO NOT USE ON FACE OR GROIN (Patient not taking: Reported on 6/17/2021)      cholecalciferol (VITAMIN D3) (50,000 UNITS /1250 MCG) capsule TAKE ONE CAPSULE BY MOUTH EACH WEEK (Patient not taking: Reported on 6/17/2021)      ergocalciferol (ERGOCALCIFEROL) 50,000 unit capsule Take 1 Cap by mouth every seven (7) days. (Patient not taking: Reported on 6/17/2021) 12 Cap 3       Past History     Past Medical History:  Past Medical History:   Diagnosis Date    Anemia     Condyloma acuminatum     Environmental allergies     Groin pain     Obstructive sleep apnea on CPAP     CATARINA (obstructive sleep apnea)     Plantar fasciitis     Sciatica     Sinusitis     Tinea pedis        Past Surgical History:  Past Surgical History:   Procedure Laterality Date    HX OTHER SURGICAL      dental       Family History:  Family History   Problem Relation Age of Onset    Hypertension Mother     Diabetes Mother     Hypertension Father        Social History:  Social History     Tobacco Use    Smoking status: Never Smoker    Smokeless tobacco: Never Used   Substance Use Topics    Alcohol use: No    Drug use: No       Allergies:   Allergies   Allergen Reactions    Penicillins Rash, Swelling and Hives    Mold Extracts Rash    Peanut Rash and Swelling         Review of Systems     Review of Systems   Constitutional: Negative for diaphoresis and fever. HENT: Negative for congestion and sore throat. Eyes: Negative for pain and itching. Respiratory: Negative for cough and shortness of breath. Cardiovascular: Negative for chest pain and palpitations. Gastrointestinal: Negative for abdominal pain and diarrhea. Endocrine: Negative for polydipsia and polyuria. Genitourinary: Negative for dysuria and hematuria. Musculoskeletal: Positive for myalgias. Negative for arthralgias. Skin: Negative for rash and wound. Neurological: Negative for seizures and syncope. Hematological: Does not bruise/bleed easily. Psychiatric/Behavioral: Negative for agitation and hallucinations. Physical Exam       Patient Vitals for the past 12 hrs:   Temp Pulse Resp BP SpO2   05/18/22 0744 98.6 °F (37 °C) 84 17 127/88 100 %       IPVITALS  Patient Vitals for the past 24 hrs:   BP Temp Pulse Resp SpO2 Height Weight   05/18/22 0744 127/88 98.6 °F (37 °C) 84 17 100 % 5' 9\" (1.753 m) 99.8 kg (220 lb)       Physical Exam  Vitals and nursing note reviewed. Constitutional:       Appearance: He is well-developed. HENT:      Head: Normocephalic and atraumatic. Eyes:      General: No scleral icterus. Conjunctiva/sclera: Conjunctivae normal.   Neck:      Vascular: No JVD. Cardiovascular:      Rate and Rhythm: Normal rate and regular rhythm. Pulmonary:      Effort: Pulmonary effort is normal. No respiratory distress. Musculoskeletal:      Cervical back: Normal range of motion and neck supple. Comments: I can appreciate fasciculations in the quadriceps area possibly sartorius muscle corresponding to his episodes of pain. Otherwise has good motion of the hip and neurovascularly intact. Hip itself is not tender.   Pain tracks from lateral aspect of the hip across the anterior the thigh to the distal medial thigh   Skin:     General: Skin is warm and dry. Neurological:      Mental Status: He is alert. Psychiatric:         Thought Content: Thought content normal.         Judgment: Judgment normal.           Diagnostic Study Results   Labs -  Recent Results (from the past 24 hour(s))   METABOLIC PANEL, BASIC    Collection Time: 05/18/22  8:00 AM   Result Value Ref Range    Sodium 142 136 - 145 mmol/L    Potassium 3.7 3.5 - 5.5 mmol/L    Chloride 106 100 - 111 mmol/L    CO2 33 (H) 21 - 32 mmol/L    Anion gap 3 3.0 - 18 mmol/L    Glucose 115 (H) 74 - 99 mg/dL    BUN 11 7.0 - 18 MG/DL    Creatinine 1.11 0.6 - 1.3 MG/DL    BUN/Creatinine ratio 10 (L) 12 - 20      GFR est AA >60 >60 ml/min/1.73m2    GFR est non-AA >60 >60 ml/min/1.73m2    Calcium 8.9 8.5 - 10.1 MG/DL   MAGNESIUM    Collection Time: 05/18/22  8:00 AM   Result Value Ref Range    Magnesium 2.2 1.6 - 2.6 mg/dL       Radiologic Studies -   No orders to display     No results found. Medications ordered:   Medications   ibuprofen (MOTRIN) tablet 800 mg (800 mg Oral Given 5/18/22 0800)   cyclobenzaprine (FLEXERIL) tablet 10 mg (10 mg Oral Given 5/18/22 0800)         Medical Decision Making   Initial Medical Decision Making and DDx:  Not suspicious for hip pathology bony fracture joint pathology. Most consistent with muscle spasms possibly is Hermelinda's muscle. We will get basic lab work for any contributing electrolyte abnormality, treat here with Flexeril and ibuprofen after discussion with the patient. Recommend follow-up with primary care and a course of physical therapy. He is agreeable with all this. ED Course: Progress Notes, Reevaluation, and Consults:         I am the first provider for this patient. I reviewed the vital signs, available nursing notes, past medical history, past surgical history, family history and social history.     Patient Vitals for the past 12 hrs:   Temp Pulse Resp BP SpO2   05/18/22 0744 98.6 °F (37 °C) 84 17 127/88 100 %       Vital Signs-Reviewed the patient's vital signs. Pulse Oximetry Analysis, Cardiac Monitor, 12 lead ekg:      Interpreted by the EP. Records Reviewed: Nursing notes reviewed (Time of Review: 7:48 AM)    Procedures:   Critical Care Time:   Aspirin: (was aspirin given for stroke?)    Diagnosis     Clinical Impression:   1. Muscle spasm        Disposition: Discharged      Follow-up Information     Follow up With Specialties Details Why Contact Info    Dereck Cohen MD Internal Medicine Physician In 2 days  [address] [address] [phone]             Patient's Medications   Start Taking    CYCLOBENZAPRINE (FLEXERIL) 5 MG TABLET    Take 2 Tablets by mouth three (3) times daily. Continue Taking    CHOLECALCIFEROL (VITAMIN D3) (50,000 UNITS /1250 MCG) CAPSULE    TAKE ONE CAPSULE BY MOUTH EACH WEEK    CLOBETASOL (TEMOVATE) 0.05 % OINTMENT    APPLY SPARINGLY TO AFFECTED AREA(S) TWICE DAILY DO NOT USE ON FACE OR GROIN    ERGOCALCIFEROL (ERGOCALCIFEROL) 50,000 UNIT CAPSULE    Take 1 Cap by mouth every seven (7) days. FLUTICASONE PROPIONATE (FLONASE ALLERGY RELIEF) 50 MCG/ACTUATION NASAL SPRAY    2 Sprays by Both Nostrils route daily as needed for Rhinitis or Allergies. IBUPROFEN (MOTRIN) 800 MG TABLET    Take  by mouth. LEVOCETIRIZINE (XYZAL) 5 MG TABLET    Take  by mouth. LISDEXAMFETAMINE (VYVANSE) 50 MG CAP    Take 1 Capsule by mouth daily. Max Daily Amount: 50 mg. LISINOPRIL-HYDROCHLOROTHIAZIDE (PRINZIDE, ZESTORETIC) 10-12.5 MG PER TABLET    Take 1 Tablet by mouth daily. MONTELUKAST (SINGULAIR) 10 MG TABLET    Take 1 Tablet by mouth daily. MULTIVITAMIN, TX-IRON-CA-MIN (THERA-M W/ IRON) 9 MG IRON-400 MCG TAB TABLET    Take 1 Tablet by mouth daily. OTHER    Men's multivitamin    OTHER    Iron 18mg po daily    OTHER    Take 6 Capsules by mouth daily. Grassfed Beef Liver    OTHER    3 UNSPECIFIED.  Organic Men's Multi vit 3 gummies seferino PREDNISONE (STERAPRED DS) 10 MG DOSE PACK    See administration instruction per 10mg dose pack    SODIUM CHLORIDE (SALINE NASAL MIST) 0.65 % NASAL SQUEEZE BOTTLE    0.05 mL by Both Nostrils route as needed for Congestion. These Medications have changed    No medications on file   Stop Taking    CYCLOBENZAPRINE (FLEXERIL) 10 MG TABLET    Take 1 Tab by mouth nightly as needed for Muscle Spasm(s).  Indications: muscle spasm     _______________________________    Notes:    Darleen Godoy MD using Dragon dictation      _______________________________

## 2022-05-18 NOTE — LETTER
NOTIFICATION RETURN TO WORK / SCHOOL    5/18/2022 9:40 AM    Mr. Leah Torres  [address] [facility] [address] [id number]      To Whom It May Concern:    Leah Torres is currently under the care of [id number] [place] [address] EMERGENCY DEPT. He will return to work/school on: 5/19/2022    If there are questions or concerns please have the patient contact our office.         Sincerely,      Paula Yu RN

## 2022-05-18 NOTE — ED TRIAGE NOTES
Pt reports upper back muscle strain after lifting heavy item x 3 months. States then lower back pain after gym class. States hx of groin pain x 1 year, received cortisone injection for this in past.  States groin pain continues. States feels \"tight\". Denies injury.

## 2022-05-23 ENCOUNTER — TELEPHONE (OUTPATIENT)
Dept: INTERNAL MEDICINE CLINIC | Age: 37
End: 2022-05-23

## 2022-05-23 NOTE — TELEPHONE ENCOUNTER
Patient was called. Informed of lab results. Verified patient is taking Tacrolimus 1 mg tablets - 1 tablet by mouth BID. Patient is scheduled for next lab check on 06/16/2021. Will monitor for lab completion.    Pt on schedule for Wednesday

## 2022-05-23 NOTE — TELEPHONE ENCOUNTER
----- Message from Darwin Maycol sent at 5/23/2022 10:32 AM EDT -----  Subject: Appointment Request    Reason for Call: Routine ED Follow Up Visit    QUESTIONS  Type of Appointment? Established Patient  Reason for appointment request? No appointments available during search  Additional Information for Provider? pt was seen in the emergency room   last week for back pain and spasms in his back and legs. He was told he   needs to have a referral to have an MRI and to follow up with his pcp for   pain management and to move forward. Please call the pt to schedule or let   him know if an order can be put in without being seen. ---------------------------------------------------------------------------  --------------  Lacho LASSITER  What is the best way for the office to contact you? OK to leave message on   voicemail  Preferred Call Back Phone Number? [phone]  ---------------------------------------------------------------------------  --------------  SCRIPT ANSWERS  Relationship to Patient? Self  Did you have an injury or trauma within the past 3 days? No  Did you have an injury or trauma within the past 3 days? No  (Patient requests to see provider urgently. )? No  Do you have any questions for your primary care provider that need to be   answered prior to your appointment? No  Have you been diagnosed with, awaiting test results for, or told that you   are suspected of having COVID-19 (Coronavirus)? (If patient has tested   negative or was tested as a requirement for work, school, or travel and   not based on symptoms, answer no)? No  Within the past 10 days have you developed any of the following symptoms   (answer no if symptoms have been present longer than 10 days or began   more than 10 days ago)?  Fever or Chills, Cough, Shortness of breath or   difficulty breathing, Loss of taste or smell, Sore throat, Nasal   congestion, Sneezing or runny nose, Fatigue or generalized body aches   (answer no if pain is specific to a body part e.g. back pain), Diarrhea,   Headache? No  Have you had close contact with someone with COVID-19 in the last 7 days? No  (Service Expert  click yes below to proceed with "[employer] As Usual   Scheduling)?  Yes

## 2022-05-25 ENCOUNTER — VIRTUAL VISIT (OUTPATIENT)
Dept: INTERNAL MEDICINE CLINIC | Age: 37
End: 2022-05-25
Payer: COMMERCIAL

## 2022-05-25 DIAGNOSIS — F90.2 ATTENTION DEFICIT HYPERACTIVITY DISORDER (ADHD), COMBINED TYPE: ICD-10-CM

## 2022-05-25 DIAGNOSIS — S86.911A MUSCLE STRAIN OF RIGHT LOWER LEG, INITIAL ENCOUNTER: ICD-10-CM

## 2022-05-25 DIAGNOSIS — M62.830 MUSCLE SPASM OF BACK: Primary | ICD-10-CM

## 2022-05-25 PROCEDURE — 99214 OFFICE O/P EST MOD 30 MIN: CPT | Performed by: INTERNAL MEDICINE

## 2022-05-25 RX ORDER — METHOCARBAMOL 750 MG/1
1500 TABLET, FILM COATED ORAL 4 TIMES DAILY
Qty: 60 TABLET | Refills: 1 | Status: SHIPPED | OUTPATIENT
Start: 2022-05-25 | End: 2022-07-12

## 2022-05-25 RX ORDER — PREDNISONE 10 MG/1
TABLET ORAL
Qty: 21 TABLET | Refills: 0 | Status: SHIPPED | OUTPATIENT
Start: 2022-05-25 | End: 2022-07-12

## 2022-05-25 NOTE — PROGRESS NOTES
Bridgette Vargas, was evaluated through a synchronous (real-time) audio-video encounter. The patient (or guardian if applicable) is aware that this is a billable service, which includes applicable co-pays. This Virtual Visit was conducted with patient's (and/or legal guardian's) consent. The visit was conducted pursuant to the emergency declaration under the [facility], [address] [place] [address] and the [employer] and [employer] Act. Patient identification was verified, and a caregiver was present when appropriate. The patient was located at: Home: [address] [place] [address]  The provider was located at: [facility] [address] [place] [phone]       Bridgette Vargas is a 31 y.o.  male and presents with Back Pain and Leg Pain (right )      SUBJECTIVE:    Back Pain  Patient presents for presents evaluation of low back problems. Symptoms have been present for 1 week and include pain in right lower back and right leg (severe, cramping in character; 9/10 in severity). Initial inciting event: pt pulled back muscle at work about 4 weeks ago. Symptoms are worst: nighttime. Alleviating factors identifiable by patient are recumbency. Exacerbating factors identifiable by patient are standing, walking. Treatments so far initiated by patient: Robaxin, Flexeril, Naprosyn and Motrin which he got from ER 5/18/22. Previous lower back problems: yes. Previous workup: Xrays. Previous treatments: PT.    Pt doing well on Vyvanse 50 g for ADD and will refill script today. Respiratory ROS: negative for - shortness of breath  Cardiovascular ROS: negative for - chest pain    Current Outpatient Medications   Medication Sig    lisdexamfetamine (VYVANSE) 50 mg cap Take 1 Capsule by mouth daily.  Max Daily Amount: 50 mg.    methocarbamoL (ROBAXIN) 750 mg tablet Take 2 Tablets by mouth four (4) times daily. Indications: muscle spasm    predniSONE (STERAPRED DS) 10 mg dose pack See administration instruction per 10mg dose pack    montelukast (SINGULAIR) 10 mg tablet Take 1 Tablet by mouth daily.  fluticasone propionate (Flonase Allergy Relief) 50 mcg/actuation nasal spray 2 Sprays by Both Nostrils route daily as needed for Rhinitis or Allergies.  lisinopril-hydroCHLOROthiazide (PRINZIDE, ZESTORETIC) 10-12.5 mg per tablet Take 1 Tablet by mouth daily.  OTHER 3 UNSPECIFIED. Organic Men's Multi vit 3 gummies seferino (Patient not taking: Reported on 9/9/2021)    ibuprofen (MOTRIN) 800 mg tablet Take  by mouth. (Patient not taking: Reported on 9/9/2021)    OTHER Iron 18mg po daily (Patient not taking: Reported on 9/9/2021)    levocetirizine (Xyzal) 5 mg tablet Take  by mouth.  clobetasoL (TEMOVATE) 0.05 % ointment APPLY SPARINGLY TO AFFECTED AREA(S) TWICE DAILY DO NOT USE ON FACE OR GROIN (Patient not taking: Reported on 6/17/2021)    ergocalciferol (ERGOCALCIFEROL) 50,000 unit capsule Take 1 Cap by mouth every seven (7) days. (Patient not taking: Reported on 6/17/2021)     No current facility-administered medications for this visit. OBJECTIVE:  alert, well appearing, and in no distress  There were no vitals taken for this visit. well developed and well nourished              Assessment/Plan      ICD-10-CM ICD-9-CM    1. Muscle spasm of back  M62.830 724.8 REFERRAL TO PHYSICAL THERAPY      Will treat with methocarbamoL (ROBAXIN) 750 mg tablet      And predniSONE (STERAPRED DS) 10 mg dose pack   2. Muscle strain of right lower leg, initial encounter  S86.911A 844.9 REFERRAL TO PHYSICAL THERAPY      Will treat with methocarbamoL (ROBAXIN) 750 mg tablet      predniSONE (STERAPRED DS) 10 mg dose pack   3.  Attention deficit hyperactivity disorder (ADHD), combined type  F90.2 314.01 lisdexamfetamine (VYVANSE) 50 mg cap     Follow-up and Dispositions    · Return if symptoms worsen or fail to improve. Reviewed plan of care. Patient has provided input and agrees with goals.

## 2022-05-26 ENCOUNTER — TELEPHONE (OUTPATIENT)
Dept: INTERNAL MEDICINE CLINIC | Age: 37
End: 2022-05-26

## 2022-05-26 NOTE — TELEPHONE ENCOUNTER
----- Message from Stephen Dowling sent at 5/26/2022  9:35 AM EDT -----  Subject: Referral Request    QUESTIONS   Reason for referral request? Pt is having back issues and pain and would   like to see if he can get an MRI, pls give pt a call as possible. Has the physician seen you for this condition before? No   Preferred Specialist (if applicable)? Do you already have an appointment scheduled? No  Additional Information for Provider?   ---------------------------------------------------------------------------  --------------  CALL BACK INFO  What is the best way for the office to contact you? OK to leave message on   voicemail  Preferred Call Back Phone Number? [phone]  ---------------------------------------------------------------------------  --------------  SCRIPT ANSWERS  Relationship to Patient?  Self

## 2022-05-26 NOTE — TELEPHONE ENCOUNTER
----- Message from Josh Denney sent at 5/26/2022  9:35 AM EDT -----  Subject: Referral Request    QUESTIONS   Reason for referral request? Pt is having back issues and pain and would   like to see if he can get an MRI, pls give pt a call as possible. Has the physician seen you for this condition before? No   Preferred Specialist (if applicable)? Do you already have an appointment scheduled? No  Additional Information for Provider?   ---------------------------------------------------------------------------  --------------  CALL BACK INFO  What is the best way for the office to contact you? OK to leave message on   voicemail  Preferred Call Back Phone Number? [phone]  ---------------------------------------------------------------------------  --------------  SCRIPT ANSWERS  Relationship to Patient?  Self

## 2022-05-27 NOTE — TELEPHONE ENCOUNTER
Pt back pain improved with prednisone and he will f/u with PT. Only if PT is not improving his pain will consider MRI. Case d/w pt.

## 2022-05-31 ENCOUNTER — HOSPITAL ENCOUNTER (EMERGENCY)
Age: 37
Discharge: HOME | End: 2022-06-01
Attending: EMERGENCY MEDICINE
Payer: COMMERCIAL

## 2022-05-31 ENCOUNTER — APPOINTMENT (OUTPATIENT)
Dept: CT IMAGING | Age: 37
End: 2022-05-31
Attending: EMERGENCY MEDICINE
Payer: COMMERCIAL

## 2022-05-31 DIAGNOSIS — R10.31 RIGHT INGUINAL PAIN: Primary | ICD-10-CM

## 2022-05-31 DIAGNOSIS — R07.89 MUSCULOSKELETAL CHEST PAIN: ICD-10-CM

## 2022-05-31 LAB
ALBUMIN SERPL-MCNC: 3.7 G/DL (ref 3.4–5)
ALBUMIN/GLOB SERPL: 1.1 {RATIO} (ref 0.8–1.7)
ALP SERPL-CCNC: 79 U/L (ref 45–117)
ALT SERPL-CCNC: 48 U/L (ref 16–61)
ANION GAP SERPL CALC-SCNC: 3 MMOL/L (ref 3–18)
AST SERPL-CCNC: 25 U/L (ref 10–38)
BASOPHILS # BLD: 0 K/UL (ref 0–0.1)
BASOPHILS NFR BLD: 1 % (ref 0–2)
BILIRUB SERPL-MCNC: 0.3 MG/DL (ref 0.2–1)
BUN SERPL-MCNC: 14 MG/DL (ref 7–18)
BUN/CREAT SERPL: 12 (ref 12–20)
CALCIUM SERPL-MCNC: 8.6 MG/DL (ref 8.5–10.1)
CHLORIDE SERPL-SCNC: 105 MMOL/L (ref 100–111)
CO2 SERPL-SCNC: 32 MMOL/L (ref 21–32)
CREAT SERPL-MCNC: 1.14 MG/DL (ref 0.6–1.3)
DIFFERENTIAL METHOD BLD: ABNORMAL
EOSINOPHIL # BLD: 0.2 K/UL (ref 0–0.4)
EOSINOPHIL NFR BLD: 3 % (ref 0–5)
ERYTHROCYTE [DISTWIDTH] IN BLOOD BY AUTOMATED COUNT: 13.5 % (ref 11.6–14.5)
GLOBULIN SER CALC-MCNC: 3.3 G/DL (ref 2–4)
GLUCOSE SERPL-MCNC: 97 MG/DL (ref 74–99)
HCT VFR BLD AUTO: 38.4 % (ref 36–48)
HGB BLD-MCNC: 12.9 G/DL (ref 13–16)
IMM GRANULOCYTES # BLD AUTO: 0 K/UL (ref 0–0.04)
IMM GRANULOCYTES NFR BLD AUTO: 0 % (ref 0–0.5)
LIPASE SERPL-CCNC: 723 U/L (ref 73–393)
LYMPHOCYTES # BLD: 3.1 K/UL (ref 0.9–3.6)
LYMPHOCYTES NFR BLD: 50 % (ref 21–52)
MCH RBC QN AUTO: 30.6 PG (ref 24–34)
MCHC RBC AUTO-ENTMCNC: 33.6 G/DL (ref 31–37)
MCV RBC AUTO: 91.2 FL (ref 78–100)
MONOCYTES # BLD: 0.5 K/UL (ref 0.05–1.2)
MONOCYTES NFR BLD: 8 % (ref 3–10)
NEUTS SEG # BLD: 2.4 K/UL (ref 1.8–8)
NEUTS SEG NFR BLD: 39 % (ref 40–73)
NRBC # BLD: 0 K/UL (ref 0–0.01)
NRBC BLD-RTO: 0 PER 100 WBC
PLATELET # BLD AUTO: 244 K/UL (ref 135–420)
PMV BLD AUTO: 8.7 FL (ref 9.2–11.8)
POTASSIUM SERPL-SCNC: 3.7 MMOL/L (ref 3.5–5.5)
PROT SERPL-MCNC: 7 G/DL (ref 6.4–8.2)
RBC # BLD AUTO: 4.21 M/UL (ref 4.35–5.65)
SODIUM SERPL-SCNC: 140 MMOL/L (ref 136–145)
WBC # BLD AUTO: 6.2 K/UL (ref 4.6–13.2)

## 2022-05-31 PROCEDURE — 74177 CT ABD & PELVIS W/CONTRAST: CPT

## 2022-05-31 PROCEDURE — 74011250636 HC RX REV CODE- 250/636: Performed by: EMERGENCY MEDICINE

## 2022-05-31 PROCEDURE — 99285 EMERGENCY DEPT VISIT HI MDM: CPT

## 2022-05-31 PROCEDURE — 80053 COMPREHEN METABOLIC PANEL: CPT

## 2022-05-31 PROCEDURE — 85025 COMPLETE CBC W/AUTO DIFF WBC: CPT

## 2022-05-31 PROCEDURE — 83690 ASSAY OF LIPASE: CPT

## 2022-05-31 PROCEDURE — 96374 THER/PROPH/DIAG INJ IV PUSH: CPT

## 2022-05-31 PROCEDURE — 74011000636 HC RX REV CODE- 636: Performed by: EMERGENCY MEDICINE

## 2022-05-31 RX ORDER — KETOROLAC TROMETHAMINE 15 MG/ML
15 INJECTION, SOLUTION INTRAMUSCULAR; INTRAVENOUS ONCE
Status: COMPLETED | OUTPATIENT
Start: 2022-05-31 | End: 2022-05-31

## 2022-05-31 RX ADMIN — KETOROLAC TROMETHAMINE 15 MG: 15 INJECTION, SOLUTION INTRAMUSCULAR; INTRAVENOUS at 23:21

## 2022-05-31 RX ADMIN — IOPAMIDOL 100 ML: 612 INJECTION, SOLUTION INTRAVENOUS at 23:57

## 2022-06-01 VITALS
SYSTOLIC BLOOD PRESSURE: 140 MMHG | HEART RATE: 78 BPM | RESPIRATION RATE: 16 BRPM | WEIGHT: 218 LBS | HEIGHT: 69 IN | BODY MASS INDEX: 32.29 KG/M2 | TEMPERATURE: 98.7 F | DIASTOLIC BLOOD PRESSURE: 93 MMHG | OXYGEN SATURATION: 100 %

## 2022-06-01 RX ORDER — NAPROXEN 500 MG/1
500 TABLET ORAL 2 TIMES DAILY WITH MEALS
Qty: 6 TABLET | Refills: 0 | Status: SHIPPED | OUTPATIENT
Start: 2022-06-01 | End: 2022-06-04

## 2022-06-01 NOTE — ED PROVIDER NOTES
EMERGENCY DEPARTMENT HISTORY AND PHYSICAL EXAM    11:01 PM  Date: 5/31/2022  Patient Name: Robles Rodriguez    History of Presenting Illness       History Provided By:     HPI: Robles Rodriguez is a 31 y.o. male with medical history as below including hernia presents with right lower quadrant and right groin pain that started about a month. Patient states that he has been lifting heavy items and may have pulled a muscle. Pain is worse with movement, moderate severity, no associated symptoms modifying factors. Patient also complains of some right-sided back pain. Patient denies any hernias. Denies any testicular pain. PCP: Crystal Angel MD    Past History     Past Medical History:  Past Medical History:   Diagnosis Date    Anemia     Condyloma acuminatum     Environmental allergies     Groin pain     Obstructive sleep apnea on CPAP     CATARINA (obstructive sleep apnea)     Plantar fasciitis     Sciatica     Sinusitis     Tinea pedis        Past Surgical History:  Past Surgical History:   Procedure Laterality Date    HX OTHER SURGICAL      dental       Family History:  Family History   Problem Relation Age of Onset    Hypertension Mother     Diabetes Mother     Hypertension Father        Social History:  Social History     Tobacco Use    Smoking status: Never Smoker    Smokeless tobacco: Never Used   Substance Use Topics    Alcohol use: No    Drug use: No       Allergies: Allergies   Allergen Reactions    Penicillins Rash, Swelling and Hives    Mold Extracts Rash    Peanut Rash and Swelling       Review of Systems   Review of Systems   Constitutional: Negative for activity change, appetite change and chills. HENT: Negative for congestion, ear discharge, ear pain and sore throat. Eyes: Negative for photophobia and pain. Respiratory: Negative for cough and choking. Cardiovascular: Negative for palpitations and leg swelling.    Gastrointestinal: Positive for abdominal pain. Negative for anal bleeding and rectal pain. Endocrine: Negative for polydipsia and polyuria. Genitourinary: Negative for genital sores, testicular pain and urgency. Musculoskeletal: Negative for arthralgias and myalgias. Neurological: Negative for dizziness, seizures and speech difficulty. Psychiatric/Behavioral: Negative for hallucinations, self-injury and suicidal ideas. Physical Exam     Patient Vitals for the past 12 hrs:   Temp Pulse Resp BP SpO2   05/31/22 2223 98.7 °F (37.1 °C) 84 16 (!) 148/105 98 %       Physical Exam  Vitals and nursing note reviewed. Constitutional:       Appearance: He is well-developed. HENT:      Head: Normocephalic and atraumatic. Eyes:      General:         Right eye: No discharge. Left eye: No discharge. Cardiovascular:      Rate and Rhythm: Normal rate and regular rhythm. Heart sounds: Normal heart sounds. No murmur heard. Pulmonary:      Effort: Pulmonary effort is normal. No respiratory distress. Breath sounds: Normal breath sounds. No stridor. No wheezing or rales. Chest:      Chest wall: No tenderness. Abdominal:      General: Bowel sounds are normal. There is no distension. Palpations: Abdomen is soft. Tenderness: There is no abdominal tenderness. There is no guarding or rebound. Genitourinary:     Testes: Normal.   Musculoskeletal:         General: Normal range of motion. Cervical back: Normal range of motion and neck supple. Comments: R groin pain and tenderness     Skin:     General: Skin is warm and dry. Neurological:      Mental Status: He is alert and oriented to person, place, and time. Diagnostic Study Results     Labs -  No results found for this or any previous visit (from the past 12 hour(s)). Radiologic Studies -   No results found. Medical Decision Making     ED Course: Progress Notes, Reevaluation, and Consults:    11:01 PM Initial assessment performed.  The patients presenting problems have been discussed, and they/their family are in agreement with the care plan formulated and outlined with them. I have encouraged them to ask questions as they arise throughout their visit. Provider Notes (Medical Decision Making):   Patient presents with right lower quadrant abdominal/groin pain  Pain is worse with movement, no palpable hernias, masses  Vitals within normal limits apart from hypertension  No leukocytosis no electrolyte abnormality  CT abdomen pelvis no acute findings  Patient feels better on reassessment  Patient will be discharged with PMD follow-up      Vital Signs-Reviewed the patient's vital signs. Reviewed pt's pulse ox reading. Records Reviewed: old medical records  -I am the first provider for this patient.  -I reviewed the vital signs, available nursing notes, past medical history, past surgical history, family history and social history. Current Facility-Administered Medications   Medication Dose Route Frequency Provider Last Rate Last Admin    ketorolac (TORADOL) injection 15 mg  15 mg IntraVENous Aga Hilario MD         Current Outpatient Medications   Medication Sig Dispense Refill    lisdexamfetamine (VYVANSE) 50 mg cap Take 1 Capsule by mouth daily. Max Daily Amount: 50 mg. 30 Capsule 0    methocarbamoL (ROBAXIN) 750 mg tablet Take 2 Tablets by mouth four (4) times daily. Indications: muscle spasm 60 Tablet 1    predniSONE (STERAPRED DS) 10 mg dose pack See administration instruction per 10mg dose pack 21 Tablet 0    montelukast (SINGULAIR) 10 mg tablet Take 1 Tablet by mouth daily. 90 Tablet 1    fluticasone propionate (Flonase Allergy Relief) 50 mcg/actuation nasal spray 2 Sprays by Both Nostrils route daily as needed for Rhinitis or Allergies. 1 Each 5    lisinopril-hydroCHLOROthiazide (PRINZIDE, ZESTORETIC) 10-12.5 mg per tablet Take 1 Tablet by mouth daily. 90 Tablet 1    OTHER 3 UNSPECIFIED.  Organic Men's Multi vit 3 gummies seferino (Patient not taking: Reported on 9/9/2021)      ibuprofen (MOTRIN) 800 mg tablet Take  by mouth. (Patient not taking: Reported on 9/9/2021)      OTHER Iron 18mg po daily (Patient not taking: Reported on 9/9/2021)      levocetirizine (Xyzal) 5 mg tablet Take  by mouth.  clobetasoL (TEMOVATE) 0.05 % ointment APPLY SPARINGLY TO AFFECTED AREA(S) TWICE DAILY DO NOT USE ON FACE OR GROIN (Patient not taking: Reported on 6/17/2021)      ergocalciferol (ERGOCALCIFEROL) 50,000 unit capsule Take 1 Cap by mouth every seven (7) days. (Patient not taking: Reported on 6/17/2021) 12 Cap 3        Clinical Impression     Clinical Impression: No diagnosis found. Disposition: This note was dictated utilizing voice recognition software which may lead to typographical errors. I apologize in advance if the situation occurs. If questions arise please do not hesitate to contact me or call our department.     Darline Johnson MD  11:01 PM

## 2022-06-01 NOTE — ED NOTES
Right lower back pain extending into right hip and around along thigh to inside of right knee and right groin pain.

## 2022-06-01 NOTE — ED TRIAGE NOTES
Patient states he has had right sided low back pain x one month. He states he started having pain going into right hip and down leg. He states he has had sciatica before but this pain is much worse. He is now having pain in his right groin x one week. He has been taking methocarbamol, tylenol and prednisone. He states he has not been consistent with taking prednisone.

## 2022-06-14 ENCOUNTER — HOSPITAL ENCOUNTER (OUTPATIENT)
Dept: PHYSICAL THERAPY | Age: 37
Discharge: HOME | End: 2022-06-14
Payer: COMMERCIAL

## 2022-06-14 PROCEDURE — 97161 PT EVAL LOW COMPLEX 20 MIN: CPT

## 2022-06-14 PROCEDURE — 97530 THERAPEUTIC ACTIVITIES: CPT

## 2022-06-14 NOTE — THERAPY EVALUATION
PT DAILY TREATMENT NOTE/LUMBAR EVAL     Patient Name: Virgen Warren  Date:2022  : 1985  [x]  Patient  Verified  Payor: Bess Mediate / Plan: Erin Cuevas / Product Type: HMO /    In time: 3:29  Out time: 4:25  Total Treatment Time (min): 56  Visit #: 1 of     Medicare/BCBS Only   Total Timed Codes (min):  38 1:1 Treatment Time:  56     Treatment Area: Other low back pain [M54.59]  Right leg pain [M79.604]  SUBJECTIVE  Pain Level (0-10 scale): /10  []constant []intermittent []improving []worsening []no change since onset    Any medication changes, allergies to medications, adverse drug reactions, diagnosis change, or new procedure performed?: [x] No    [] Yes (see summary sheet for update)  Subjective functional status/changes:     PLOF: ind with all mobility, 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 Julieta Kinsey is a 32 y/o, M pt with CC of Right back, hip, knee pain. He reports occasional numbness with his Right LE but no bowel or bladder problem. Pain started about 2-3 weeks ago after \"multiple incidents of pulling Right back & leg from lifting at work. \" Pt recalled negative findings with CT scan.      Previous Treatment/Compliance:   PMHx/Surgical Hx:   Work Hx:   Living Situation:   Pt Goals: less pain & re-strengthen my muscles  Barriers: []pain []financial []time []transportation []other  Motivation:   Substance use: []Alcohol []Tobacco []other:   FABQ Score: []low []elevate  Cognition: A & O x     Other:    OBJECTIVE/EXAMINATION  Domestic Life:   Activity/Recreational Limitations:   Mobility:   Self Care:       18 min [x]Eval                  []Re-Eval       38 min Therapeutic Activity:  []  See flow sheet :Pt edu within scope of practice on prognosis, POC, pain management/modalities use, answered pt's question on safety/body mechanics, lifting mechanics, HEP review   Rationale: increase ROM, increase strength, improve coordination, improve balance and increase proprioception  to improve the patients ability to perform ADLs/job duties with more ease       With   [] TE   [] TA   [] neuro   [] other: Patient Education: [x] Review HEP    [] Progressed/Changed HEP based on:   [] positioning   [] body mechanics   [] transfers   [] heat/ice application    [] other:      Other Objective/Functional Measures:     Physical Therapy Evaluation - Lumbar Spine ([facility])    SUBJECTIVE  Chief Complaint:    Mechanism of injury:    Symptoms:  Aggravated by:   [] Bending [] Sitting [] Standing [x] Walking   [] Moving [] Cough [] Sneeze [] Valsalva   [] AM  [] PM  Lying:  [] sup   [] pro   [] sidelying   [x] Other:  lifting      Eased by: medication    [] Bending [] Sitting [] Standing [] Walking   [] Moving [] AM  [] PM  Lying: [] sup  [] pro  [] sidelying   [] Other:     General Health:  Red Flags Indicated? [] Yes    [] No  [] Yes [] No Recent weight change (If yes, due to dieting?  [] Yes  [] No)   [] Yes [] No Weakness in legs during walking  [] Yes [] No Unremitting pain at night  [] Yes [] No Abdominal pain or problems  [] Yes [] No Rectal bleeding  [] Yes [] No Feet more cold or painful in cold weather  [] Yes [] No Menstrual irregularities  [] Yes [] No Blood or pain with urination  [] Yes [] No Dysfunction of bowel or bladder  [] Yes [] No Recent illness within past 3 weeks (i.e, cold, flu)  [] Yes [] No Numbness/tingling in buttock/genitalia region    Past History/Treatments:     Diagnostic Tests: [] Lab work [] X-rays    [] CT [] MRI     [] Other:  Results:    Functional Status  Prior level of function:  Present functional limitations:  What position do you sleep in?:    OBJECTIVE  Posture:  Lateral Shift: [] R    [] L     [] +  [] -  Kyphosis: [] Increased [] Decreased   []  WNL  Lordosis:  [] Increased [] Decreased   [] WNL  Pelvic symmetry: [] WNL    [] Other:    Gait:  [] Normal     [] Abnormal:    Active Movements: [] N/A   [] Too acute   [x] Other: WNL except  ROM % AROM % PROM Comments:pain, area   Forward flexion 40-60      Extension 20-30      SB right 20-30 75%  Mod pain   SB left 20-30      Rotation right 5-10      Rotation left 5-10        Repeated Movements   Effects on present pain: produces (MI), abolishes (A), increases (incr), decreases (decr), centralizes (C), peripheral (PH), no effect (NE)   Pre-Test Sx Flexion Repeated Flexion Extension Repeated Extension Repeated SBL Repeated SBR   Sitting          Standing   NE       Lying     NE N/A N/A   Comments:  Side Glide:  Sustained passive positioning test:    Neuro Screen [x] WNL Myotome & Dermatome/Reflexes:  Comments:    Dural Mobility:  SLR Sitting: [] R    [] L    [] +    [] -  @ (degrees):           Supine: [] R    [] L    [] +    [] -  @ (degrees):   Slump Test: [] R    [] L    [] +    [] -  @ (degrees):   Prone Knee Bend: [] R    [] L    [] +    [] -     Palpation  [] Min  [x] Mod  [] Severe    Location: tightness with t/s & l/s paraspinal muscles B,   [x] Min  [] Mod  [] Severe    Location: with lat aspect of Right hip, iliac crest, SI joint and glute med  [] Min  [] Mod  [] Severe    Location:    Strength   L(0-5) R (0-5) N/T   Hip Flexion (L1,2)   []   Knee Extension (L3,4)   []   Ankle Dorsiflexion (L4)   []   Great Toe Extension (L5)   []   Ankle Plantarflexion (S1)   []   Knee Flexion (S1,2)   []   Upper Abdominals   []   Lower Abdominals   []   Paraspinals   []   Back Rotators   []   Gluteus Elias   []   Other   []     Special Tests  Lumbar:  Lumb.  Compression: [] Pos  [] Neg               Lumbar Distraction:   [] Pos  [] Neg    Quadrant:  [] Pos  [] Neg   [] Flex  [] Ext    Sacroilliac:  Gaenslen's: [] R    [] L    [] +    [] -     Compression: [] +    [] -     Gapping:  [] +    [] -     Thigh Thrust: [] R    [] L    [] +    [] -     Leg Length: [] +    [] - Position:    Crests:    ASIS:    PSIS:    Sacral Sulcus:    Mobility: Standing flex:     Sitting flex:     Supine to sit:     Prone knee bend:         Hip: Leonetta Guanako:  [x] R    [x] L    [x] +    [] -     Scour:  [x] R    [] L    [] +    [x] -     Piriformis: [x] R    [x] L    [] +    [] -          Deficits: Gricelda's: [] R    [] L    [] +    [] -     Ned: [x] R    [x] L    [x] +    [] -     Hamstrings 90/90: min tightness    Gastrocsoleus (to neutral): mod tightness B Right: Left:       Global Muscular Weakness:  Abdominals:  Quadratus Lumborum:  Paraspinals: Other:    Other tests/comments:       Pain Level (0-10 scale) post treatment: 2/10    ASSESSMENT/Changes in Function: see POC    Patient will continue to benefit from skilled PT services to modify and progress therapeutic interventions, address functional mobility deficits, address ROM deficits, address strength deficits, analyze and address soft tissue restrictions, analyze and cue movement patterns, analyze and modify body mechanics/ergonomics, assess and modify postural abnormalities, address imbalance/dizziness and instruct in home and community integration to attain remaining goals.      [x]  See Plan of Care  []  See progress note/recertification  []  See Discharge Summary         Progress towards goals / Updated goals:  See POC    PLAN  [x]  Upgrade activities as tolerated     [x]  Continue plan of care  []  Update interventions per flow sheet       []  Discharge due to:_  []  Other:_      Kristine Pavon 6/14/2022  3:08 PM

## 2022-06-14 NOTE — THERAPY EVALUATION
[facility] - Vanesa Schumacher  [address] [facility]  [phone] fax    Plan of Care/ Statement of Necessity for Physical Therapy Services    Patient name: Clotilde Delarosa Start of Care: 2022   Referral source: Zbigniew Amaya MD : 1985    Medical Diagnosis: Other low back pain [M54.59]  Right leg pain [M79.604]  Payor: Renetta Bonds / Plan: Lizbeth Collazo / Product Type: HMO /  Onset Date: 2022    Treatment Diagnosis: Right back, hip pain   Prior Hospitalization: see medical history Provider#: [id number]   Medications: Verified on Patient summary List    Comorbidities: depression, HTN   Prior Level of Function: ind with all mobility, 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 Assessment/ key information: Mr. Colby Bledsoe is a 32 y/o, M pt with CC of Right back, hip, knee pain. He reports occasional numbness with his Right LE but no bowel or bladder problem. Pain started about 2-3 weeks ago after \"multiple incidents of pulling Right back & leg from lifting at work. \" Pt recalled negative findings with CT scan. Pt present with fair posture significant tightness of paraspinal muscles, hip IR, quad & hips flexors. TTP along Right QL, glute med, piriformis, lat aspect of Right hip. WNL with Right patellar mobility but mod pain with testing. WNL with dermatomes and myotomes screening, no directional reference. Pt would benefit from skilled PT to address these deficits above for pain free functional mobility.     Evaluation Complexity History MEDIUM  Complexity : 1-2 comorbidities / personal factors will impact the outcome/ POC ; Examination LOW Complexity : 1-2 Standardized tests and measures addressing body structure, function, activity limitation and / or participation in recreation  ;Presentation LOW Complexity : Stable, uncomplicated  ;Clinical Decision Making MEDIUM Complexity : FOTO score of 26-74  Overall Complexity Rating: LOW   Problem List: pain affecting function, decrease ROM, decrease strength, edema affecting function, impaired gait/ balance, decrease ADL/ functional abilitiies, decrease activity tolerance, decrease flexibility/ joint mobility and decrease transfer abilities   Treatment Plan may include any combination of the following: Therapeutic exercise, Therapeutic activities, Neuromuscular re-education, Physical agent/modality, Gait/balance training, Aquatic therapy, Patient education, Self Care training, Functional mobility training, Home safety training and Stair training  Patient / Family readiness to learn indicated by: asking questions, trying to perform skills and interest  Persons(s) to be included in education: patient (P)  Barriers to Learning/Limitations: None  Patient Goal (s): less pain & re-strengthen my muscles  Patient Self Reported Health Status: fair  Rehabilitation Potential: good      Short term goals: To be accomplished within 1 week  1. Pt will be independent with HEP to maintain progression. Eval status: given and reviewed HEP     Long term goals: To be accomplished within 4 weeks  1. Pt will improve FOTO score by 15 points to 73/100 to show improvement with functional mobility performance. Eval status: 58                2. Pt will report at least 50% improvement with pain & numbness symptoms to improve his QOL. Eval status: 1-10/10 pain, worst with heavy lifting and prolonged walking                3. Pt will report No difficulty with performing usual work & housework to improve his QOL. Eval status: Moderate difficulty     4. Pt will demonstrate appropriate lifting mechanics to improve tolerance & safety with job duties.   Eval status: good understanding after education, will review next visits       Frequency / Duration: Patient to be seen 2-3 times per week for 4 weeks. Patient/  Caregiver education and instruction: Diagnosis, prognosis, self care, activity modification, brace/ splint application and exercises   [x]  Plan of care has been reviewed with KIMO Beltran 6/14/2022 3:09 PM    ________________________________________________________________________    I certify that the above Therapy Services are being furnished while the patient is under my care. I agree with the treatment plan and certify that this therapy is necessary.     [de-identified] Signature:____________Date:_________TIME:________     Pratima Ren MD  ** Signature, Date and Time must be completed for valid certification **    Please sign and return to [facility] - [facility] [employer] [facility]  [address] [place] [address]  [phone] fax

## 2022-06-15 ENCOUNTER — TELEPHONE (OUTPATIENT)
Dept: PHYSICAL THERAPY | Age: 37
End: 2022-06-15

## 2022-06-20 ENCOUNTER — HOSPITAL ENCOUNTER (OUTPATIENT)
Dept: PHYSICAL THERAPY | Age: 37
Discharge: HOME | End: 2022-06-20
Payer: COMMERCIAL

## 2022-06-20 PROCEDURE — 97110 THERAPEUTIC EXERCISES: CPT

## 2022-06-20 NOTE — THERAPY EVALUATION
PT DAILY TREATMENT NOTE    Patient Name: Eneida Gomez  Date:2022  : 1985  [x]  Patient  Verified  Payor: Mila Jang / Plan: Bruna Peoples / Product Type: HMO /    In time: 9:45  Out time: 10:35  Total Treatment Time (min): 50  Visit #: 2 of     Medicare/BCBS Only   Total Timed Codes (min):  40 1:1 Treatment Time:  23     Treatment Area: Other low back pain [M54.59]  Right leg pain [M79.604]     SUBJECTIVE  Pain Level (0-10 scale): 0/10 at rest, up to 8/10 with movement  []constant []intermittent []improving []worsening []no change since onset    Any medication changes, allergies to medications, adverse drug reactions, diagnosis change, or new procedure performed?: [x] No    [] Yes (see summary sheet for update)  Subjective functional status/changes:     Patient reports no pain at rest but pain can go up to 3/54 with certain movements. He retold history of his injury. He works multiple jobs as a  for gym class, a  delivering [employer], as well as helping his cousin move a pool table which pulled a ms and digging a whole to fix a pipe in his yard which pulled a ms. He never had a chance to rest from his injuries. OBJECTIVE    Modality rationale: decrease inflammation and decrease pain to improve the patients ability to perform ADLs with ease.    Min Type Additional Details    [] Estim: []Att   []Unatt  []TENS instruct                 []IFC  []Premod []NMES                       []Other:  []w/US   []w/ice   []w/heat  Position:  Location:    []  Traction: [] Cervical       []Lumbar                       [] Prone          []Supine                       []Intermittent   []Continuous Lbs:  [] before manual  [] after manual    []  Ultrasound: []Continuous   [] Pulsed                           []1MHz   []3MHz Location:  W/cm2:    []  Iontophoresis with dexamethasone         Location: [] Take home patch   [] In clinic   10 [x]  Ice     []  heat  []  Ice massage Position: left sidelying  Location: right hip and psoas region    []  Vasopneumatic Device:  If using vaso (only need to measure limb vaso being performed on)      pre-treatment girth :       post-treatment girth :       measured at (landmark location)  Pressure: [] lo [] med [] hi   Temp: [] lo [] med [] hi   [x] Skin assessment post-treatment:  [x]intact []redness- no adverse reaction       []redness - adverse reaction:     40/23 min Therapeutic Exercise:  []  See flow sheet :    Rationale: increase ROM, increase strength, improve coordination, improve balance and increase proprioception  to improve the patients ability to perform ADLs/job duties with more ease       With   [] TE   [] TA   [] neuro   [] other: Patient Education: [x] Review HEP    [] Progressed/Changed HEP based on:   [] positioning   [] body mechanics   [] transfers   [] heat/ice application    [] other:      Other Objective/Functional Measures:   - right hip and psoas pain with all stretches and therex  - left upslip and left AI corrected with LAD and MET/shotgun, no change in pain    Pain Level (0-10 scale) post treatment: 4/10 at rest    ASSESSMENT/Changes in Function:   Initiated PT POC today per flow sheet, requiring vc and demo 100% of the time for proper form and technique with TE. Educated on performing stretching every day and strengthening on days he does not come to therapy. Patient may benefit from trial of electrical stimulation to decrease pain as he reports ice and heat do not really help.      Patient will continue to benefit from skilled PT services to modify and progress therapeutic interventions, address functional mobility deficits, address ROM deficits, address strength deficits, analyze and address soft tissue restrictions, analyze and cue movement patterns, analyze and modify body mechanics/ergonomics, assess and modify postural abnormalities, address imbalance/dizziness and instruct in home and community integration to attain remaining goals. [x]  See Plan of Care  []  See progress note/recertification  []  See Discharge Summary         Progress towards goals / Updated goals:  Short term goals: To be accomplished within 1 week  1. Pt will be independent with HEP to maintain progression. Eval status: given and reviewed HEP  Current: partial compliance (6/20/22)     Long term goals: To be accomplished within 4 weeks  1. Pt will improve FOTO score by 15 points to 73/100 to show improvement with functional mobility performance. Eval status: 58                2. Pt will report at least 50% improvement with pain & numbness symptoms to improve his QOL. Eval status: 1-10/10 pain, worst with heavy lifting and prolonged walking                3. Pt will report No difficulty with performing usual work & housework to improve his QOL. Eval status: Moderate difficulty     4. Pt will demonstrate appropriate lifting mechanics to improve tolerance & safety with job duties.   Eval status: good understanding after education, will review next visits    PLAN  [x]  Upgrade activities as tolerated     [x]  Continue plan of care  []  Update interventions per flow sheet       []  Discharge due to:_  []  Other:_      Yesica Ewing, KIMO 6/20/2022  10:35 AM

## 2022-06-24 ENCOUNTER — HOSPITAL ENCOUNTER (OUTPATIENT)
Dept: PHYSICAL THERAPY | Age: 37
Discharge: HOME | End: 2022-06-24
Payer: COMMERCIAL

## 2022-06-24 PROCEDURE — 97110 THERAPEUTIC EXERCISES: CPT

## 2022-06-27 ENCOUNTER — HOSPITAL ENCOUNTER (OUTPATIENT)
Dept: PHYSICAL THERAPY | Age: 37
Discharge: HOME | End: 2022-06-27
Payer: COMMERCIAL

## 2022-06-27 PROCEDURE — 97110 THERAPEUTIC EXERCISES: CPT

## 2022-06-27 PROCEDURE — 97140 MANUAL THERAPY 1/> REGIONS: CPT

## 2022-06-27 NOTE — PROGRESS NOTES
PT DAILY TREATMENT NOTE     Patient Name: Derik De Souza  Date:2022  : 1985  [x]  Patient  Verified  Payor: Edna Fontana / Plan: Celine Winkler / Product Type: HMO /    In time: 3:03  Out time: 3:45  Total Treatment Time (min): 41  Visit #: 4 of     Medicare/BCBS Only   Total Timed Codes (min):  41 1:1 Treatment Time:  41       Treatment Area: Other low back pain [M54.59]  Right leg pain [M79.604]    SUBJECTIVE  Pain Level (0-10 scale): 2/10  Any medication changes, allergies to medications, adverse drug reactions, diagnosis change, or new procedure performed?: [x] No    [] Yes (see summary sheet for update)  Subjective functional status/changes:   [] No changes reported  Pt reports ongoing pain that fluctuates with certain movements. He got a stability ball to work on core strengthening. 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 He is frustrated because no imaging was done to try to figure out the source of his pain. He sometimes has it down to the knee then sometimes down the side of the leg and other times his pain is in his glute.      OBJECTIVE    31 min Therapeutic Exercise:  [x] See flow sheet :   Rationale: increase ROM and increase strength to improve the patients ability to increase ease with ADLs    10 min Manual Therapy:  [x] See flow sheet : leg lengthening for right upslip; MET for right AI; shotgun technique    Piriformis muscle pump   Rationale: increase ROM and increase strength to improve the patients ability to increase ease with ADLs    With   [x] TE   [] TA   [] neuro   [] other: Patient Education: [x] Review HEP    [] Progressed/Changed HEP based on:   [] positioning   [] body mechanics   [] transfers   [] heat/ice application    [] other:      Other Objective/Functional Measures:   TTP right multifidi, right piriformis, right ITB and right rectus femoris  In standing he keeps the right hip slightly flexed compared to the left  Positive right adam test  Decreased B hip IR mobility  Educated to add hip flexor stretching at home to start and then we would work on progressive eccentric strengthening for lengthening        Pain Level (0-10 scale) post treatment: 2/10    ASSESSMENT/Changes in Function: Pt continues with increased right hip pain that does not follow a specific pattern given pain reports. He has pectineus and hip flexor tightness with pain upon palpation. He does also have piriformis hypertonicity and l/s hypertonicity on the right. He stands with increased right hip flexion compared to left which could be contributing to the glute pain from altering his push off during gait. Will start with addressing the pain/tightness in the hip flexors and improve neutral hip positioning and then progress to surrounding musculature. Patient will continue to benefit from skilled PT services to modify and progress therapeutic interventions, address functional mobility deficits, address ROM deficits, address strength deficits, analyze and cue movement patterns, analyze and modify body mechanics/ergonomics, assess and modify postural abnormalities, address imbalance/dizziness and instruct in home and community integration to attain remaining goals. [x]  See Plan of Care  [x]  See progress note/recertification  []  See Discharge Summary         Progress towards goals / Updated goals:  Short term goals: To be accomplished within 1 week  1. Pt will be independent with HEP to maintain progression. Eval status: given and reviewed HEP  Current: partial compliance (6/20/22)   Long term goals: To be accomplished within 4 weeks  1. Pt will improve FOTO score by 15 points to 73/100 to show improvement with functional mobility performance. Eval status: 58              2. Pt will report at least 50% improvement with pain & numbness symptoms to improve his QOL.   Eval status: 1-10/10 pain, worst with heavy lifting and prolonged walking              3. Pt will report No difficulty with performing usual work & housework to improve his QOL. Eval status: Moderate difficulty   4. Pt will demonstrate appropriate lifting mechanics to improve tolerance & safety with job duties.   Eval status: good understanding after education, will review next visits    PLAN  [x]  Upgrade activities as tolerated     [x]  Continue plan of care  []  Update interventions per flow sheet       []  Discharge due to:_  []  Other:_      Lindsay Garay PTA 6/27/2022  7:23 AM    Future Appointments   Date Time Provider Tono Evans   6/27/2022  3:00 PM Carmita Contreras, KIMO [facility]   6/29/2022  6:00 PM Ashlyn Tejeda, PTA [facility]   7/1/2022  3:00 PM Carmita Contreras, PTA [facility]   7/7/2022  3:00 PM Chloe Harris, PT [facility]   7/8/2022  3:00 PM Chloe Harris, PT [facility]   7/11/2022  3:45 PM Ashlyn Tejeda, PTA [facility]   7/13/2022  3:45 PM Diogenes Grate [facility]   7/15/2022  3:45 PM Franck Ortiz, PTA [facility]

## 2022-06-29 ENCOUNTER — HOSPITAL ENCOUNTER (OUTPATIENT)
Dept: PHYSICAL THERAPY | Age: 37
Discharge: HOME | End: 2022-06-29
Payer: COMMERCIAL

## 2022-06-29 PROCEDURE — 97140 MANUAL THERAPY 1/> REGIONS: CPT

## 2022-06-29 PROCEDURE — 97110 THERAPEUTIC EXERCISES: CPT

## 2022-06-29 NOTE — PROGRESS NOTES
PT DAILY TREATMENT NOTE     Patient Name: Ale Strong  Date:2022  : 1985  [x]  Patient  Verified  Payor: Rustam Gallardo / Plan: Sharad Oseguera / Product Type: HMO /    In time: 6:14  Out time: 6:56  Total Treatment Time (min): 42  Visit #: 5 of     Medicare/BCBS Only   Total Timed Codes (min):  32 1:1 Treatment Time:  32       Treatment Area: Other low back pain [M54.59]  Right leg pain [M79.604]    SUBJECTIVE  Pain Level (0-10 scale): 2/10 right anterior hip/groin  Any medication changes, allergies to medications, adverse drug reactions, diagnosis change, or new procedure performed?: [x] No    [] Yes (see summary sheet for update)  Subjective functional status/changes:   [] No changes reported  Pt reports he still has pain in the front of his hip, pointing to hip flexor and groin region. OBJECTIVE    Modality rationale: decrease inflammation and decrease pain to improve the patients ability to ambulate and perform ADLs with ease.    Min Type Additional Details    [] Estim: []Att   []Unatt  []TENS instruct                 []IFC  []Premod []NMES                       []Other:  []w/US   []w/ice   []w/heat  Position:  Location:    []  Traction: [] Cervical       []Lumbar                       [] Prone          []Supine                       []Intermittent   []Continuous Lbs:  [] before manual  [] after manual    []  Ultrasound: []Continuous   [] Pulsed                           []1MHz   []3MHz Location:  W/cm2:    []  Iontophoresis with dexamethasone         Location: [] Take home patch   [] In clinic   10 [x]  Ice     []  heat  []  Ice massage Position: left sidelying  Location: right hip    []  Vasopneumatic Device:  If using vaso (only need to measure limb vaso being performed on)      pre-treatment girth :       post-treatment girth :       measured at (landmark location)  Pressure: [] lo [] med [] hi   Temp: [] lo [] med [] hi   [x] Skin assessment post-treatment:  [x]intact []redness- no adverse reaction       []redness - adverse reaction:     12 min Therapeutic Exercise:  [x] See flow sheet :   Rationale: increase ROM and increase strength to improve the patients ability to increase ease with ADLs    20 min Manual Therapy:  [x] See flow sheet : leg lengthening for right upslip; MET for right AI; shotgun technique; STM and TPR to right lumbar ps and STM and MFR to right QL   Rationale: increase ROM and increase strength to improve the patients ability to increase ease with ADLs    With   [x] TE   [] TA   [] neuro   [] other: Patient Education: [x] Review HEP    [] Progressed/Changed HEP based on:   [] positioning   [] body mechanics   [] transfers   [] heat/ice application    [] other:      Other Objective/Functional Measures:   - trigger points in right L3-L5 paraspinals  - ms tension ir right QL  - Right upslip and Right AI  - cramping in left calf with TA with GS    Pain Level (0-10 scale) post treatment: 3/10 at rest, goes up to a 7/10 with movement    ASSESSMENT/Changes in Function:   Patient with fair tolerance to individual and manual hip stretching with increased pain. Patient not receptive to cuing to perform stretches to tolerance not to push into the pain. Continued right upslip and right AI corrected with leg lengthening and MET/shotgun with c/o increased right hip pain after. Cold pack applied at end of session to decrease pain. Patient will continue to benefit from skilled PT services to modify and progress therapeutic interventions, address functional mobility deficits, address ROM deficits, address strength deficits, analyze and cue movement patterns, analyze and modify body mechanics/ergonomics, assess and modify postural abnormalities, address imbalance/dizziness and instruct in home and community integration to attain remaining goals.      [x]  See Plan of Care  [x]  See progress note/recertification  []  See Discharge Summary         Progress towards goals / Updated goals:  Short term goals: To be accomplished within 1 week  1. Pt will be independent with HEP to maintain progression. Eval status: given and reviewed HEP  Current: partial compliance (6/20/22)     Long term goals: To be accomplished within 4 weeks  1. Pt will improve FOTO score by 15 points to 73/100 to show improvement with functional mobility performance. Eval status: 58              2. Pt will report at least 50% improvement with pain & numbness symptoms to improve his QOL. Eval status: 1-10/10 pain, worst with heavy lifting and prolonged walking              3. Pt will report No difficulty with performing usual work & housework to improve his QOL. Eval status: Moderate difficulty   4. Pt will demonstrate appropriate lifting mechanics to improve tolerance & safety with job duties.   Eval status: good understanding after education, will review next visits    PLAN  [x]  Upgrade activities as tolerated     [x]  Continue plan of care  []  Update interventions per flow sheet       []  Discharge due to:_  []  Other:_      Deuce Tejeda PTA 6/29/2022  6:56 PM    Future Appointments   Date Time Provider Tono Evans   6/29/2022  6:00 PM Maty Montana [facility]   7/1/2022  3:00 PM Christine Lagos, PTA [facility]   7/7/2022  3:00 PM Rodriguez Avelar, PT [facility]   7/8/2022  3:00 PM Ralph Estimable [facility]   7/11/2022  3:45 PM Caroline Parkinson, PTA [facility]   7/13/2022  3:45 PM Maty Montana [facility]   7/15/2022  3:45 PM Dane Brown, PTA [facility]

## 2022-07-01 ENCOUNTER — HOSPITAL ENCOUNTER (OUTPATIENT)
Dept: PHYSICAL THERAPY | Age: 37
End: 2022-07-01
Payer: COMMERCIAL

## 2022-07-03 DIAGNOSIS — F90.2 ATTENTION DEFICIT HYPERACTIVITY DISORDER (ADHD), COMBINED TYPE: ICD-10-CM

## 2022-07-05 NOTE — TELEPHONE ENCOUNTER
[facility] last [facility] date for Vyvanse as 5/25/22 for a 30 d/s. Last Visit: 5/25/22 with MD Martinez  Next Appointment: none  Previous Refill Encounter(s): 5/25/22 #30    Requested Prescriptions     Pending Prescriptions Disp Refills    lisdexamfetamine (VYVANSE) 50 mg cap 30 Capsule 0     Sig: Take 1 Capsule by mouth daily. Max Daily Amount: 50 mg.          For [facility] in place:    Recommendation Provided To:    Intervention Detail: New Rx: 1, reason: Patient Preference   Gap Closed?:    Intervention Accepted By:   [facility] Time Spent (min): 5

## 2022-07-07 ENCOUNTER — TELEPHONE (OUTPATIENT)
Dept: PHYSICAL THERAPY | Age: 37
End: 2022-07-07

## 2022-07-08 ENCOUNTER — APPOINTMENT (OUTPATIENT)
Dept: PHYSICAL THERAPY | Age: 37
End: 2022-07-08
Payer: COMMERCIAL

## 2022-07-08 ENCOUNTER — OFFICE VISIT (OUTPATIENT)
Dept: INTERNAL MEDICINE CLINIC | Age: 37
End: 2022-07-08
Payer: COMMERCIAL

## 2022-07-08 VITALS
OXYGEN SATURATION: 97 % | HEART RATE: 84 BPM | HEIGHT: 69 IN | SYSTOLIC BLOOD PRESSURE: 131 MMHG | WEIGHT: 216 LBS | DIASTOLIC BLOOD PRESSURE: 89 MMHG | BODY MASS INDEX: 31.99 KG/M2 | TEMPERATURE: 97.8 F | RESPIRATION RATE: 20 BRPM

## 2022-07-08 DIAGNOSIS — M62.830 MUSCLE SPASM OF BACK: Primary | ICD-10-CM

## 2022-07-08 DIAGNOSIS — M25.561 ACUTE PAIN OF BOTH KNEES: ICD-10-CM

## 2022-07-08 DIAGNOSIS — M25.562 ACUTE PAIN OF BOTH KNEES: ICD-10-CM

## 2022-07-08 DIAGNOSIS — S76.011A HIP STRAIN, RIGHT, INITIAL ENCOUNTER: ICD-10-CM

## 2022-07-08 DIAGNOSIS — M94.0 COSTOCHONDRITIS: ICD-10-CM

## 2022-07-08 DIAGNOSIS — M79.645 BILATERAL THUMB PAIN: ICD-10-CM

## 2022-07-08 DIAGNOSIS — M79.644 BILATERAL THUMB PAIN: ICD-10-CM

## 2022-07-08 PROCEDURE — 99214 OFFICE O/P EST MOD 30 MIN: CPT | Performed by: INTERNAL MEDICINE

## 2022-07-08 RX ORDER — IBUPROFEN 800 MG/1
800 TABLET ORAL
Qty: 90 TABLET | Refills: 0 | Status: SHIPPED | OUTPATIENT
Start: 2022-07-08
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
## 2022-07-08 NOTE — LETTER
NOTIFICATION RETURN TO WORK / SCHOOL    7/8/2022 2:56 PM    Mr. Cyndi Tatum  [address] [place] [id number]      To Whom It May Concern:    Cyndi Tatum is currently under the care of Rory Franco. He will return to work/school on: 8/8/2022    If there are questions or concerns please have the patient contact our office.         Sincerely,      Luis Khalil MD

## 2022-07-08 NOTE — PATIENT INSTRUCTIONS
A Healthy Lifestyle: Care Instructions  Your Care Instructions     A healthy lifestyle can help you feel good, stay at a healthy weight, and have plenty of energy for both work and play. A healthy lifestyle is something you can share with your whole family. A healthy lifestyle also can lower your risk for serious health problems, such as high blood pressure, heart disease, and diabetes. You can follow a few steps listed below to improve your health and the health of your family. Follow-up care is a key part of your treatment and safety. Be sure to make and go to all appointments, and call your doctor if you are having problems. It's also a good idea to know your test results and keep a list of the medicines you take. How can you care for yourself at home? · Do not eat too much sugar, fat, or fast foods. You can still have dessert and treats now and then. The goal is moderation. · Start small to improve your eating habits. Pay attention to portion sizes, drink less juice and soda pop, and eat more fruits and vegetables. ? Eat a healthy amount of food. A 3-ounce serving of meat, for example, is about the size of a deck of cards. Fill the rest of your plate with vegetables and whole grains. ? Limit the amount of soda and sports drinks you have every day. Drink more water when you are thirsty. ? Eat plenty of fruits and vegetables every day. Have an apple or some carrot sticks as an afternoon snack instead of a candy bar. Try to have fruits and/or vegetables at every meal.  · Make exercise part of your daily routine. You may want to start with simple activities, such as walking, bicycling, or slow swimming. Try to be active 30 to 60 minutes every day. You do not need to do all 30 to 60 minutes all at once. For example, you can exercise 3 times a day for 10 or 20 minutes.  Moderate exercise is safe for most people, but it is always a good idea to talk to your doctor before starting an exercise program.  · Keep moving. Sera Narvaez the lawn, work in the garden, or [employer]. Take the stairs instead of the elevator at work. · If you smoke, quit. People who smoke have an increased risk for heart attack, stroke, cancer, and other lung illnesses. Quitting is hard, but there are ways to boost your chance of quitting tobacco for good. ? Use nicotine gum, patches, or lozenges. ? Ask your doctor about stop-smoking programs and medicines. ? Keep trying. In addition to reducing your risk of diseases in the future, you will notice some benefits soon after you stop using tobacco. If you have shortness of breath or asthma symptoms, they will likely get better within a few weeks after you quit. · Limit how much alcohol you drink. Moderate amounts of alcohol (up to 2 drinks a day for men, 1 drink a day for women) are okay. But drinking too much can lead to liver problems, high blood pressure, and other health problems. Family health  If you have a family, there are many things you can do together to improve your health. · Eat meals together as a family as often as possible. · Eat healthy foods. This includes fruits, vegetables, lean meats and dairy, and whole grains. · Include your family in your fitness plan. Most people think of activities such as jogging or tennis as the way to fitness, but there are many ways you and your family can be more active. Anything that makes you breathe hard and gets your heart pumping is exercise. Here are some tips:  ? Walk to do errands or to take your child to school or the bus.  ? Go for a family bike ride after dinner instead of watching TV. Where can you learn more? Go to http://www.gray.com/  Enter E482 in the search box to learn more about \"A Healthy Lifestyle: Care Instructions. \"  Current as of: June 16, 2021               Content Version: 13.2  © 4197-6300 [employer].    Care instructions adapted under license by [employer] (which disclaims liability or warranty for this information). If you have questions about a medical condition or this instruction, always ask your healthcare professional. [address] any warranty or liability for your use of this information.

## 2022-07-11 ENCOUNTER — TELEPHONE (OUTPATIENT)
Dept: PHYSICAL THERAPY | Age: 37
End: 2022-07-11
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
## 2022-07-13 ENCOUNTER — TELEPHONE (OUTPATIENT)
Dept: PHYSICAL THERAPY | Age: 37
End: 2022-07-13

## 2022-07-13 ENCOUNTER — TELEPHONE (OUTPATIENT)
Dept: INTERNAL MEDICINE CLINIC | Age: 37
End: 2022-07-13

## 2022-07-15 ENCOUNTER — APPOINTMENT (OUTPATIENT)
Dept: PHYSICAL THERAPY | Age: 37
End: 2022-07-15
Payer: COMMERCIAL

## 2022-07-20 ENCOUNTER — PATIENT MESSAGE (OUTPATIENT)
Dept: INTERNAL MEDICINE CLINIC | Age: 37
End: 2022-07-20

## 2022-07-20 DIAGNOSIS — R05.9 COUGH: Primary | ICD-10-CM

## 2022-07-20 RX ORDER — AZITHROMYCIN 250 MG/1
TABLET, FILM COATED ORAL
Qty: 6 TABLET | Refills: 0 | Status: SHIPPED | OUTPATIENT
Start: 2022-07-20 | End: 2022-07-25

## 2022-07-20 RX ORDER — CODEINE PHOSPHATE AND GUAIFENESIN 10; 100 MG/5ML; MG/5ML
10 SOLUTION ORAL
Qty: 120 ML | Refills: 0 | Status: SHIPPED | OUTPATIENT
Start: 2022-07-20 | End: 2022-07-27

## 2022-07-20 NOTE — TELEPHONE ENCOUNTER
Chino Chavezrayne 7/20/2022 10:23 AM EDT    Patient thinks he may have a sinus infection. He hs cough and congestion. Schedule is full.  Please advise.      ----- Message -----  From: Marianela Bangura  Sent: 7/20/2022 9:20 AM EDT  To: [facility] Front Office  Subject: Appointment Request     Appointment Request From: Marianela Bangura    With Provider: Sathya Duval MD [[facility]]    Preferred Date Range: 7/20/2022  7/20/2022    Preferred Times: Any Time    Reason for visit: Request an Appointment    Comments:  Cough, congestion, possible sinus infection

## 2022-07-22 NOTE — TELEPHONE ENCOUNTER
Patient is aware medication was sent to the pharmacy, and if symptoms do not improve he will need to be tested for COVID. Patient verbalizes understanding.

## 2022-07-29 ENCOUNTER — HOSPITAL ENCOUNTER (OUTPATIENT)
Dept: PHYSICAL THERAPY | Age: 37
Discharge: HOME | End: 2022-07-29
Payer: COMMERCIAL

## 2022-07-29 PROCEDURE — 97112 NEUROMUSCULAR REEDUCATION: CPT

## 2022-07-29 PROCEDURE — 97530 THERAPEUTIC ACTIVITIES: CPT

## 2022-07-29 PROCEDURE — 97140 MANUAL THERAPY 1/> REGIONS: CPT
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
## 2022-08-02 ENCOUNTER — TELEPHONE (OUTPATIENT)
Dept: PHYSICAL THERAPY | Age: 37
End: 2022-08-02

## 2022-08-03 DIAGNOSIS — F90.2 ATTENTION DEFICIT HYPERACTIVITY DISORDER (ADHD), COMBINED TYPE: ICD-10-CM

## 2022-08-03 NOTE — TELEPHONE ENCOUNTER
[facility] Vyvanse as 7/5/22 for a 30 d/s. Last Visit: 7/8/22 with MD Martinez  Next Appointment: 8/5/22 with MD Martinez  Previous Refill Encounter(s): 7/5/22 #30    Requested Prescriptions     Pending Prescriptions Disp Refills    lisdexamfetamine (VYVANSE) 50 mg cap 30 Capsule 0     Sig: Take 1 Capsule by mouth in the morning. Max Daily Amount: 50 mg. For [facility] in place:   Recommendation Provided To:    Intervention Detail: New Rx: 1, reason: Patient Preference  Gap Closed?:   Intervention Accepted By:   Time Spent (min): 5

## 2022-08-04 ENCOUNTER — HOSPITAL ENCOUNTER (OUTPATIENT)
Dept: PHYSICAL THERAPY | Age: 37
Discharge: HOME | End: 2022-08-04
Payer: COMMERCIAL

## 2022-08-04 PROCEDURE — 97110 THERAPEUTIC EXERCISES: CPT

## 2022-08-04 PROCEDURE — 97140 MANUAL THERAPY 1/> REGIONS: CPT
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
## 2022-08-05 ENCOUNTER — OFFICE VISIT (OUTPATIENT)
Dept: INTERNAL MEDICINE CLINIC | Age: 37
End: 2022-08-05
Payer: COMMERCIAL

## 2022-08-05 VITALS
WEIGHT: 205 LBS | HEART RATE: 86 BPM | OXYGEN SATURATION: 98 % | HEIGHT: 69 IN | TEMPERATURE: 97.8 F | DIASTOLIC BLOOD PRESSURE: 94 MMHG | BODY MASS INDEX: 30.36 KG/M2 | RESPIRATION RATE: 20 BRPM | SYSTOLIC BLOOD PRESSURE: 143 MMHG

## 2022-08-05 DIAGNOSIS — L98.9 SKIN LESIONS: ICD-10-CM

## 2022-08-05 DIAGNOSIS — F33.1 MODERATE EPISODE OF RECURRENT MAJOR DEPRESSIVE DISORDER (HCC): ICD-10-CM

## 2022-08-05 DIAGNOSIS — I10 ESSENTIAL HYPERTENSION: ICD-10-CM

## 2022-08-05 DIAGNOSIS — M62.830 MUSCLE SPASM OF BACK: Primary | ICD-10-CM

## 2022-08-05 DIAGNOSIS — M25.551 RIGHT HIP PAIN: ICD-10-CM

## 2022-08-05 DIAGNOSIS — F90.2 ATTENTION DEFICIT HYPERACTIVITY DISORDER (ADHD), COMBINED TYPE: ICD-10-CM

## 2022-08-05 PROCEDURE — 99214 OFFICE O/P EST MOD 30 MIN: CPT | Performed by: INTERNAL MEDICINE

## 2022-08-05 NOTE — LETTER
NOTIFICATION RETURN TO WORK / SCHOOL    8/5/2022 3:57 PM    Mr. Glai Bob  [address] [address]      To Whom It May Concern:    Gali Bob is currently under the care of Rory Franco. He will return to work/school on: 8/8/22 with restriction of not lifting more than 30 lbs for the next 4 weeks. He can return full duty on 9/5/2022    If there are questions or concerns please have the patient contact our office.         Sincerely,      Chris Mccollum MD

## 2022-08-05 NOTE — PROGRESS NOTES
Patient is in the office today for a 4 week follow up. Patient states he is depressed. 1. \"Have you been to the ER, urgent care clinic since your last visit? Hospitalized since your last visit? \" No    2. \"Have you seen or consulted any other health care providers outside of the [address] [place] [address] since your last visit? \" No     3. For patients aged 39-70: Has the patient had a colonoscopy / FIT/ Cologuard? NA - based on age      If the patient is female:    4. For patients aged 41-77: Has the patient had a mammogram within the past 2 years? NA - based on age or sex      11. For patients aged 21-65: Has the patient had a pap smear?  NA - based on age or sex

## 2022-08-08 ENCOUNTER — TELEPHONE (OUTPATIENT)
Dept: INTERNAL MEDICINE CLINIC | Age: 37
End: 2022-08-08
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
## 2022-08-08 NOTE — TELEPHONE ENCOUNTER
Patient c/o a soreness  on the left side of his chest since last night. Patient states he felt tired and light headed yesterday. Patient denies any palpitations or dyspnea, n/v , or gi upset. Sinus drainage and cough and sinus pressure for the last week. Medication prescribed last week has helped the sinus symptoms. Patient does note additional stress in his life recently. Not sure if the chest pain may be stress related. Patient is also asking about his worker compensation paperwork did not include a diagnosis and needs to be revised in order to be approved.     # [phone]

## 2022-08-08 NOTE — TELEPHONE ENCOUNTER
Paper work in [employer] 7/13 has diagnosis.  Not sure what other paper work he is referring too

## 2022-08-09 ENCOUNTER — HOSPITAL ENCOUNTER (OUTPATIENT)
Dept: PHYSICAL THERAPY | Age: 37
Discharge: HOME | End: 2022-08-09
Payer: COMMERCIAL

## 2022-08-09 PROCEDURE — 97014 ELECTRIC STIMULATION THERAPY: CPT

## 2022-08-09 PROCEDURE — 97110 THERAPEUTIC EXERCISES: CPT

## 2022-08-09 PROCEDURE — 97140 MANUAL THERAPY 1/> REGIONS: CPT

## 2022-08-09 PROCEDURE — 97112 NEUROMUSCULAR REEDUCATION: CPT

## 2022-08-09 PROCEDURE — 97530 THERAPEUTIC ACTIVITIES: CPT
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
## 2022-08-09 NOTE — TELEPHONE ENCOUNTER
Patient wanted to know if he could bring additional forms to office to fax. Patient was advised we are not allowed to fax personal information for patients.

## 2022-08-10 ENCOUNTER — APPOINTMENT (OUTPATIENT)
Dept: GENERAL RADIOLOGY | Age: 37
End: 2022-08-10
Attending: EMERGENCY MEDICINE
Payer: COMMERCIAL

## 2022-08-10 ENCOUNTER — HOSPITAL ENCOUNTER (EMERGENCY)
Age: 37
Discharge: HOME | End: 2022-08-10
Attending: EMERGENCY MEDICINE
Payer: COMMERCIAL

## 2022-08-10 VITALS
HEIGHT: 69 IN | SYSTOLIC BLOOD PRESSURE: 135 MMHG | WEIGHT: 200 LBS | TEMPERATURE: 98.2 F | OXYGEN SATURATION: 100 % | HEART RATE: 71 BPM | DIASTOLIC BLOOD PRESSURE: 91 MMHG | BODY MASS INDEX: 29.62 KG/M2 | RESPIRATION RATE: 15 BRPM

## 2022-08-10 DIAGNOSIS — R07.9 CHEST PAIN, UNSPECIFIED TYPE: Primary | ICD-10-CM

## 2022-08-10 LAB
ALBUMIN SERPL-MCNC: 3.9 G/DL (ref 3.4–5)
ALBUMIN/GLOB SERPL: 1.1 {RATIO} (ref 0.8–1.7)
ALP SERPL-CCNC: 81 U/L (ref 45–117)
ALT SERPL-CCNC: 34 U/L (ref 16–61)
ANION GAP SERPL CALC-SCNC: 3 MMOL/L (ref 3–18)
AST SERPL-CCNC: 24 U/L (ref 10–38)
ATRIAL RATE: 71 BPM
BASOPHILS # BLD: 0 K/UL (ref 0–0.1)
BASOPHILS NFR BLD: 1 % (ref 0–2)
BILIRUB SERPL-MCNC: 0.2 MG/DL (ref 0.2–1)
BUN SERPL-MCNC: 17 MG/DL (ref 7–18)
BUN/CREAT SERPL: 15 (ref 12–20)
CALCIUM SERPL-MCNC: 9.2 MG/DL (ref 8.5–10.1)
CALCULATED P AXIS, ECG09: 26 DEGREES
CALCULATED R AXIS, ECG10: 59 DEGREES
CALCULATED T AXIS, ECG11: 33 DEGREES
CHLORIDE SERPL-SCNC: 104 MMOL/L (ref 100–111)
CO2 SERPL-SCNC: 32 MMOL/L (ref 21–32)
CREAT SERPL-MCNC: 1.14 MG/DL (ref 0.6–1.3)
DIAGNOSIS, 93000: NORMAL
DIFFERENTIAL METHOD BLD: ABNORMAL
EOSINOPHIL # BLD: 0.2 K/UL (ref 0–0.4)
EOSINOPHIL NFR BLD: 4 % (ref 0–5)
ERYTHROCYTE [DISTWIDTH] IN BLOOD BY AUTOMATED COUNT: 12.7 % (ref 11.6–14.5)
GLOBULIN SER CALC-MCNC: 3.4 G/DL (ref 2–4)
GLUCOSE SERPL-MCNC: 104 MG/DL (ref 74–99)
HCT VFR BLD AUTO: 38.3 % (ref 36–48)
HGB BLD-MCNC: 12.8 G/DL (ref 13–16)
IMM GRANULOCYTES # BLD AUTO: 0 K/UL (ref 0–0.04)
IMM GRANULOCYTES NFR BLD AUTO: 0 % (ref 0–0.5)
LYMPHOCYTES # BLD: 2.5 K/UL (ref 0.9–3.6)
LYMPHOCYTES NFR BLD: 50 % (ref 21–52)
MCH RBC QN AUTO: 30.6 PG (ref 24–34)
MCHC RBC AUTO-ENTMCNC: 33.4 G/DL (ref 31–37)
MCV RBC AUTO: 91.6 FL (ref 78–100)
MONOCYTES # BLD: 0.4 K/UL (ref 0.05–1.2)
MONOCYTES NFR BLD: 7 % (ref 3–10)
NEUTS SEG # BLD: 1.9 K/UL (ref 1.8–8)
NEUTS SEG NFR BLD: 38 % (ref 40–73)
NRBC # BLD: 0 K/UL (ref 0–0.01)
NRBC BLD-RTO: 0 PER 100 WBC
P-R INTERVAL, ECG05: 182 MS
PLATELET # BLD AUTO: 214 K/UL (ref 135–420)
PMV BLD AUTO: 9.1 FL (ref 9.2–11.8)
POTASSIUM SERPL-SCNC: 4.4 MMOL/L (ref 3.5–5.5)
PROT SERPL-MCNC: 7.3 G/DL (ref 6.4–8.2)
Q-T INTERVAL, ECG07: 376 MS
QRS DURATION, ECG06: 106 MS
QTC CALCULATION (BEZET), ECG08: 408 MS
RBC # BLD AUTO: 4.18 M/UL (ref 4.35–5.65)
SODIUM SERPL-SCNC: 139 MMOL/L (ref 136–145)
TROPONIN-HIGH SENSITIVITY: 4 NG/L (ref 0–78)
TROPONIN-HIGH SENSITIVITY: 5 NG/L (ref 0–78)
VENTRICULAR RATE, ECG03: 71 BPM
WBC # BLD AUTO: 5 K/UL (ref 4.6–13.2)

## 2022-08-10 PROCEDURE — 74011250637 HC RX REV CODE- 250/637: Performed by: EMERGENCY MEDICINE

## 2022-08-10 PROCEDURE — 84484 ASSAY OF TROPONIN QUANT: CPT

## 2022-08-10 PROCEDURE — 71045 X-RAY EXAM CHEST 1 VIEW: CPT

## 2022-08-10 PROCEDURE — 80053 COMPREHEN METABOLIC PANEL: CPT

## 2022-08-10 PROCEDURE — 99285 EMERGENCY DEPT VISIT HI MDM: CPT

## 2022-08-10 PROCEDURE — 85025 COMPLETE CBC W/AUTO DIFF WBC: CPT

## 2022-08-10 PROCEDURE — 93005 ELECTROCARDIOGRAM TRACING: CPT

## 2022-08-10 RX ORDER — ALPRAZOLAM 0.25 MG/1
0.5 TABLET ORAL
Status: COMPLETED | OUTPATIENT
Start: 2022-08-10 | End: 2022-08-10

## 2022-08-10 RX ORDER — LEVOFLOXACIN 500 MG/1
TABLET, FILM COATED ORAL
COMMUNITY
Start: 2022-08-04 | End: 2022-09-02 | Stop reason: ALTCHOICE

## 2022-08-10 RX ORDER — FAMOTIDINE 40 MG/1
TABLET, FILM COATED ORAL
COMMUNITY
Start: 2022-08-04

## 2022-08-10 RX ADMIN — ALPRAZOLAM 0.5 MG: 0.25 TABLET ORAL at 03:59
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
## 2022-08-10 NOTE — ED TRIAGE NOTES
A&O male with c/o HTN and chest pain. Chest pain began yesterday around [id number]. BP was high at physical therapy appointment. Reports not taking BP meds for a few weeks due to medication change and not having new medication yet. Denies SOB.

## 2022-08-11 ENCOUNTER — HOSPITAL ENCOUNTER (OUTPATIENT)
Dept: PHYSICAL THERAPY | Age: 37
End: 2022-08-11
Payer: COMMERCIAL

## 2022-08-12 ENCOUNTER — HOSPITAL ENCOUNTER (OUTPATIENT)
Dept: PHYSICAL THERAPY | Age: 37
Discharge: HOME | End: 2022-08-12
Payer: COMMERCIAL

## 2022-08-12 PROCEDURE — 97112 NEUROMUSCULAR REEDUCATION: CPT

## 2022-08-12 PROCEDURE — 97110 THERAPEUTIC EXERCISES: CPT
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
## 2022-08-15 ENCOUNTER — HOSPITAL ENCOUNTER (OUTPATIENT)
Dept: PHYSICAL THERAPY | Age: 37
Discharge: HOME | End: 2022-08-15
Payer: COMMERCIAL

## 2022-08-15 PROCEDURE — 97530 THERAPEUTIC ACTIVITIES: CPT

## 2022-08-15 PROCEDURE — 97110 THERAPEUTIC EXERCISES: CPT

## 2022-08-15 PROCEDURE — 97014 ELECTRIC STIMULATION THERAPY: CPT

## 2022-08-15 NOTE — TELEPHONE ENCOUNTER
Patient picked up form. I also gave him a copy of the chart note from 7/8/22 and the chest xray result. He is asking for Jennifer Manzanares to call him. Stating the dx of knee pain and back spasms are not sufficient. They told him they will not accept that. They need know what is causing the knee pain and back spams.
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
## 2022-08-16 ENCOUNTER — TELEPHONE (OUTPATIENT)
Dept: INTERNAL MEDICINE CLINIC | Age: 37
End: 2022-08-16

## 2022-08-22 ENCOUNTER — TELEPHONE (OUTPATIENT)
Dept: INTERNAL MEDICINE CLINIC | Age: 37
End: 2022-08-22
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
## 2022-08-23 ENCOUNTER — HOSPITAL ENCOUNTER (OUTPATIENT)
Dept: PHYSICAL THERAPY | Age: 37
End: 2022-08-23
Payer: COMMERCIAL

## 2022-08-25 ENCOUNTER — HOSPITAL ENCOUNTER (OUTPATIENT)
Dept: PHYSICAL THERAPY | Age: 37
Discharge: HOME | End: 2022-08-25
Payer: COMMERCIAL

## 2022-08-25 PROCEDURE — 97140 MANUAL THERAPY 1/> REGIONS: CPT

## 2022-08-25 PROCEDURE — 97110 THERAPEUTIC EXERCISES: CPT

## 2022-08-25 NOTE — PROGRESS NOTES
PT DAILY TREATMENT NOTE     Patient Name: Ale Strong  Date:2022  : 1985  [x]  Patient  Verified  Payor: Rustam Gallardo / Plan: Sharad Oseguera / Product Type: HMO /    In time: 5:15  Out time: 6:15  Total Treatment Time (min): 60  Visit #: 5 of     Medicare/BCBS Only   Total Timed Codes (min):  50 1:1 Treatment Time:  50       Treatment Area: Other low back pain [M54.59]  Right leg pain [M79.604]  Left leg pain [M79.605]  Right hip pain [M25.551]    SUBJECTIVE  Pain Level (0-10 scale):  4/10  Any medication changes, allergies to medications, adverse drug reactions, diagnosis change, or new procedure performed?: [x] No    [] Yes (see summary sheet for update)  Subjective functional status/changes:   [] No changes reported  Pt. Reports he is doing a little better overall but continues to have a lot of pain in right hip and back. He also reports having a lot of sensitivity over right knee where is scar is.      OBJECTIVE    Modality rationale: decrease pain and increase tissue extensibility to improve the patients ability to perform ADLs   Min Type Additional Details    [] Estim:  []Unatt       []IFC  []Premod                        []Other:  []w/ice   []w/heat  Position:  Location:    [] Estim: []Att    []TENS instruct  []NMES                    []Other:  []w/US   []w/ice   []w/heat  Position:  Location:    []  Traction: [] Cervical       []Lumbar                       [] Prone          []Supine                       []Intermittent   []Continuous Lbs:  [] before manual  [] after manual    []  Ultrasound: []Continuous   [] Pulsed                           []1MHz   []3MHz W/cm2:  Location:    []  Iontophoresis with dexamethasone         Location: [] Take home patch   [] In clinic   10 []  Ice     [x]  heat  []  Ice massage  []  Laser   []  Anodyne Position: seated  Location: low back, right hip, left upper back    []  Laser with stim  []  Other:  Position:  Location:    [] Vasopneumatic Device    []  Right     []  Left  Pre-treatment girth:  Post-treatment girth:  Measured at (location):  Pressure:       [] lo [] med [] hi   Temperature: [] lo [] med [] hi   [x] Skin assessment post-treatment:  [x]intact []redness- no adverse reaction    []redness - adverse reaction:     40 min Therapeutic Exercise:  [x] See flow sheet :   Rationale: increase ROM and increase strength to improve the patients ability to perform ADLs    10 min Manual Therapy:  right hip distraction, inf hip mobs, post hip mobs   The manual therapy interventions were performed at a separate and distinct time from the therapeutic activities interventions. Rationale: decrease pain, increase ROM, and increase tissue extensibility to ambulate          With   [x] TE   [] TA   [] neuro   [] other: Patient Education: [x] Review HEP    [] Progressed/Changed HEP based on:   [] positioning   [] body mechanics   [] transfers   [] heat/ice application    [] other:      Other Objective/Functional Measures:    FOTO: 47 points  % improvement in symptoms: 25%  Box lift: pain in right hip   30 second sit to stand test: 4x  Positive hip flex/add/IR test  Pain with score but no clicking noted  No change in hip pain with lifting following manual  Attempted to perform TRX squats but was limited by hip pain    Pain Level (0-10 scale) post treatment:  4-5/10    ASSESSMENT/Changes in Function:      []  See Plan of Care  [x]  See progress note/recertification  []  See Discharge Summary         Progress towards goals / Updated goals:  See progress note    PLAN  []  Upgrade activities as tolerated     [x]  Continue plan of care  []  Update interventions per flow sheet       []  Discharge due to:_  []  Other:_      Stella Caslilas PT 8/25/2022  4:47 PM    Future Appointments   Date Time Provider Tono Evans   8/25/2022  5:15 PM Pedro Arnold PT [facility]   9/2/2022  4:00 PM Elkin Martinez MD [facility] BS AMB

## 2022-08-25 NOTE — PROGRESS NOTES
[facility] [employer]  [place]  [address] [place] [address]  [phone] fax    Physical Therapy Progress Note  Patient name: Viry Maurice Start of Care: 2022   Referral source: Isaac Cervantes MD : 1985                Medical Diagnosis: Other low back pain [M54.59]  Right leg pain [M79.604]  Payor: BLUE CROSS / Plan: Hotreader Setter / Product Type: HMO /  Onset Date: 2022                Treatment Diagnosis: Right back, hip pain   Prior Hospitalization: see medical history Provider#: [id number]   Medications: Verified on Patient summary List    Comorbidities: depression, HTN   Prior Level of Function: ind with all mobility, heavy lifting and 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 Is making limited progress towards goals. FOTO score decreased to 47 points but he does report a 25% improvement in symptoms since [facility]. He is most limited by right hip pain, chest pain, and thoracic pain. He can perform a 15# box lift but has excessive pain in right hip causing him to excessively lift with his back. 30 second sit to stand test was limited to 4x. His right hip pain is anterior and in groin. Positive hip flex/add/IR test and pain with scour test but no clicking was noted. He may benefit from further imaging or referral to orthopedic doctor if his right hip pain does not improve. Skilled PT is medically necessary in order to improve LE strength and core stability in order to increase ease of ambulation and return to work duties.      Updated Goals: to be achieved in 4 weeks:  Goal: Pt will improve FOTO score by 15 points to 73/100 to show improvement with functional mobility performance. Status at evaluation/last progress note: 47 points    2. Goal: Pt will report at least 50% improvement with pain & numbness symptoms to improve his QOL. Status at evaluation/last progress note: 25%    3. Goal: Patient will perform 25# box lift with good form and no increase in pain in order to increase ease of working. Status at evaluation/last progress note: right hip pain with 15# box lift. 4.   Goal: Patient will improve 30 second sit to stand test to 8x in order to increase ease of transfers at home. Status at evaluation/last progress note: 4x    ASSESSMENT/RECOMMENDATIONS:  [x]Continue therapy per initial plan/protocol at a frequency of  2 x per week for 4 weeks  []Continue therapy with the following recommended changes:_____________________      _____________________________________________________________________  []Discontinue therapy progressing towards or have reached established goals  []Discontinue therapy due to lack of appreciable progress towards goals  []Discontinue therapy due to lack of attendance or compliance  []Await Physician's recommendations/decisions regarding therapy  []Other:________________________________________________________________    Thank you for this referral.   Vasiliy Ann, PT 8/25/2022 7:00 PM    NOTE TO PHYSICIAN:  PLEASE COMPLETE THE ORDERS BELOW AND   FAX TO [facility]: [phone]  If you are unable to process this request in 24 hours please contact our office: [phone]    I have read the above report and request that my patient continue as recommended. I have read the above report and request that my patient continue therapy with the following changes/special instructions:____________________________________  I have read the above report and request that my patient be discharged from therapy.     Stephan Roy signature: ______________________________Date: ______Time:______     Lianet Murillo MD

## 2022-08-29 ENCOUNTER — HOSPITAL ENCOUNTER (OUTPATIENT)
Dept: PHYSICAL THERAPY | Age: 37
Discharge: HOME | End: 2022-08-29
Payer: COMMERCIAL

## 2022-08-29 PROCEDURE — 97140 MANUAL THERAPY 1/> REGIONS: CPT

## 2022-08-29 PROCEDURE — 97110 THERAPEUTIC EXERCISES: CPT

## 2022-08-29 NOTE — PROGRESS NOTES
PT DAILY TREATMENT NOTE     Patient Name: Junior Munroe  Date:2022  : 1985  [x]  Patient  Verified  Payor: Yee Rubio / Plan: Chon Pedraza / Product Type: HMO /    In time: 5:20  Out time: 6:04  Total Treatment Time (min): 44  Visit #: 1 of 8    Medicare/BCBS Only   Total Timed Codes (min):  44 1:1 Treatment Time:  44       Treatment Area: Other low back pain [M54.59]  Right leg pain [M79.604]  Left leg pain [M79.605]  Right hip pain [M25.551]    SUBJECTIVE  Pain Level (0-10 scale): 4/10  Any medication changes, allergies to medications, adverse drug reactions, diagnosis change, or new procedure performed?: [x] No    [] Yes (see summary sheet for update)  Subjective functional status/changes:   [] No changes reported  Pt. Reports he has most pain in left upper back and right hip. He reports he was able to work his  job using knee braces. OBJECTIVE    29 min Therapeutic Exercise:  [x] See flow sheet :   Rationale: increase ROM and increase strength to improve the patients ability to increase ease of ADLs    15 min Manual Therapy: trigger point release and STM to left thoracic paraspinals and right glute med   The manual therapy interventions were performed at a separate and distinct time from the therapeutic activities interventions. Rationale: decrease pain, increase ROM, and increase tissue extensibility to increase ease of ADLs          With   [x] TE   [] TA   [] neuro   [] other: Patient Education: [x] Review HEP    [] Progressed/Changed HEP based on:   [] positioning   [] body mechanics   [] transfers   [] heat/ice application    [] other:      Other Objective/Functional Measures:   Pt. Continues to require cues to avoid performing exercises until he has pain  He has multiple trigger points along left thoracic paraspinals and right glute med that reduced slightly with manual  Pt.  Sit to stand transfers and lifting continue to be limited by right hip pain     Pain Level (0-10 scale) post treatment: 4/10    ASSESSMENT/Changes in Function:  pt. Is making limited progress towards goals. He continues to have pain in hip with sit to stand transfers and lifting. He has good trunk mobility during rotation exercises but pain increases as his repetitions increase. He continues to report compliance with his HEP. Patient will continue to benefit from skilled PT services to modify and progress therapeutic interventions, address functional mobility deficits, address ROM deficits, address strength deficits, analyze and address soft tissue restrictions, analyze and cue movement patterns, analyze and modify body mechanics/ergonomics, assess and modify postural abnormalities, and address imbalance/dizziness to attain remaining goals. Progress towards goals / Updated goals:  Goal: Pt will improve FOTO score by 15 points to 73/100 to show improvement with functional mobility performance. Status at evaluation/last progress note: 47 points     2. Goal: Pt will report at least 50% improvement with pain & numbness symptoms to improve his QOL. Status at evaluation/last progress note: 25%     3. Goal: Patient will perform 25# box lift with good form and no increase in pain in order to increase ease of working. Status at evaluation/last progress note: right hip pain with 15# box lift. 4.   Goal: Patient will improve 30 second sit to stand test to 8x in order to increase ease of transfers at home.    Status at evaluation/last progress note: 4x    PLAN  []  Upgrade activities as tolerated     [x]  Continue plan of care  []  Update interventions per flow sheet       []  Discharge due to:_  []  Other:_      Melissa Sun, PT 8/29/2022  5:37 PM    Future Appointments   Date Time Provider Tono Evans   9/2/2022  4:00 PM Adore Martinez MD [facility] BS AMB

## 2022-09-02 ENCOUNTER — VIRTUAL VISIT (OUTPATIENT)
Dept: INTERNAL MEDICINE CLINIC | Age: 37
End: 2022-09-02
Payer: COMMERCIAL

## 2022-09-02 DIAGNOSIS — Z18.81: ICD-10-CM

## 2022-09-02 DIAGNOSIS — J01.90 ACUTE NON-RECURRENT SINUSITIS, UNSPECIFIED LOCATION: ICD-10-CM

## 2022-09-02 DIAGNOSIS — S40.859A: ICD-10-CM

## 2022-09-02 DIAGNOSIS — S29.012D UPPER BACK STRAIN, SUBSEQUENT ENCOUNTER: ICD-10-CM

## 2022-09-02 DIAGNOSIS — L98.9 SKIN LESIONS: ICD-10-CM

## 2022-09-02 DIAGNOSIS — M25.551 RIGHT HIP PAIN: Primary | ICD-10-CM

## 2022-09-02 DIAGNOSIS — I10 ESSENTIAL HYPERTENSION: ICD-10-CM

## 2022-09-02 PROCEDURE — 99214 OFFICE O/P EST MOD 30 MIN: CPT | Performed by: INTERNAL MEDICINE

## 2022-09-02 RX ORDER — AZITHROMYCIN 250 MG/1
TABLET, FILM COATED ORAL
Qty: 6 TABLET | Refills: 0 | Status: SHIPPED | OUTPATIENT
Start: 2022-09-02 | End: 2022-09-07

## 2022-09-02 RX ORDER — VALSARTAN 160 MG/1
160 TABLET ORAL DAILY
Qty: 30 TABLET | Refills: 2 | Status: SHIPPED | OUTPATIENT
Start: 2022-09-02
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
## 2022-09-07 ENCOUNTER — TELEPHONE (OUTPATIENT)
Dept: INTERNAL MEDICINE CLINIC | Age: 37
End: 2022-09-07

## 2022-09-07 NOTE — TELEPHONE ENCOUNTER
Pt calling this afternoon stating his job with the [address] [place] [address] location Mr Terri Reddy does not have documentation for return to work full duty. Padmini Garcia can you please call him and see what is needed. Per pt he has given him the letter Dr Tavon Najera wrote returning him to full duty 09/05/2022. He is unsure if Mr Terri Reddy misplaced the letter or if something else is needed.     Mr Terri Reddy may be reached at [phone]

## 2022-09-08 NOTE — TELEPHONE ENCOUNTER
Left message for Mr. Cho Cece to call the office. Also sent My chart message to patient that he can print his letter from my chart.

## 2022-09-09 NOTE — TELEPHONE ENCOUNTER
Spoke with  Ashlyn Jose he states he needs a letter to return to work. Patient is aware letter is still needed for employer.

## 2022-09-12 ENCOUNTER — TELEPHONE (OUTPATIENT)
Dept: INTERNAL MEDICINE CLINIC | Age: 37
End: 2022-09-12

## 2022-09-12 NOTE — TELEPHONE ENCOUNTER
----- Message from Andria Eugene MD sent at 9/10/2022 12:32 PM EDT -----  Needs OV in 4 weeks for BP check

## 2022-11-06 ENCOUNTER — HOSPITAL ENCOUNTER (EMERGENCY)
Age: 37
Discharge: HOME | End: 2022-11-06
Attending: EMERGENCY MEDICINE
Payer: MEDICAID

## 2022-11-06 VITALS
HEART RATE: 73 BPM | TEMPERATURE: 98.3 F | RESPIRATION RATE: 18 BRPM | WEIGHT: 200 LBS | BODY MASS INDEX: 28.63 KG/M2 | HEIGHT: 70 IN | DIASTOLIC BLOOD PRESSURE: 100 MMHG | OXYGEN SATURATION: 99 % | SYSTOLIC BLOOD PRESSURE: 151 MMHG

## 2022-11-06 DIAGNOSIS — Z20.822 CLOSE EXPOSURE TO COVID-19 VIRUS: Primary | ICD-10-CM

## 2022-11-06 LAB — SARS-COV-2, COV2: NORMAL

## 2022-11-06 PROCEDURE — 99282 EMERGENCY DEPT VISIT SF MDM: CPT

## 2022-11-06 PROCEDURE — U0003 INFECTIOUS AGENT DETECTION BY NUCLEIC ACID (DNA OR RNA); SEVERE ACUTE RESPIRATORY SYNDROME CORONAVIRUS 2 (SARS-COV-2) (CORONAVIRUS DISEASE [COVID-19]), AMPLIFIED PROBE TECHNIQUE, MAKING USE OF HIGH THROUGHPUT TECHNOLOGIES AS DESCRIBED BY CMS-2020-01-R: HCPCS

## 2022-11-06 NOTE — LETTER
[address] [place] [address] [facility]  [address] [address] [phone]    Work/School Note    Date: 11/6/2022     To Whom It May concern:    Magdiel Marroquin was evaluated by the following provider(s):  Attending Provider: Connie Gonzales [address] [place] [address] virus is suspected. Per the CDC guidelines we recommend home isolation until the following conditions are all met:    1. At least five days have passed since symptoms first appeared and/or had a close exposure,   2. After home isolation for five days, wearing a mask around others for the next five days,  3. At least 24 have passed since last fever without the use of fever-reducing medications and  4.  Symptoms (eg cough, shortness of breath) have improved      Sincerely,          Ashok Moe MD

## 2022-11-06 NOTE — ED PROVIDER NOTES
Headache    S: 13year-old male who presents to ER with complaint having a headache. Patient mother had COVID this week and he was post her. He came developed vague muscle aches started this afternoon. He also went to get a sepsis stimulant from 7 he took tonight and after sexual stimulant he developed vague headache. No nausea vomiting shortness of breath fever chills. Past Medical History:   Diagnosis Date    Anemia     Condyloma acuminatum     Environmental allergies     Groin pain     Obstructive sleep apnea on CPAP     CATARINA (obstructive sleep apnea)     Plantar fasciitis     Sciatica     Sinusitis     Tinea pedis        Past Surgical History:   Procedure Laterality Date    HX OTHER SURGICAL      dental         Family History:   Problem Relation Age of Onset    Hypertension Mother     Diabetes Mother     Hypertension Father        Social History     Socioeconomic History    Marital status:      Spouse name: Not on file    Number of children: Not on file    Years of education: Not on file    Highest education level: Not on file   Occupational History    Not on file   Tobacco Use    Smoking status: Never    Smokeless tobacco: Never   Substance and Sexual Activity    Alcohol use: No    Drug use: Not Currently    Sexual activity: Not on file   Other Topics Concern    Not on file   Social History Narrative    Not on file     Social Determinants of Health     Financial Resource Strain: Not on file   Food Insecurity: Not on file   Transportation Needs: Not on file   Physical Activity: Not on file   Stress: Not on file   Social Connections: Not on file   Intimate Partner Violence: Not on file   Housing Stability: Not on file         ALLERGIES: Penicillins, Food [peanut], and Mold extracts    Review of Systems   Constitutional: Negative. HENT: Negative. Eyes: Negative. Respiratory: Negative. Cardiovascular: Negative. Gastrointestinal: Negative. Endocrine: Negative.     Genitourinary: Negative. Musculoskeletal:  Positive for arthralgias. Skin: Negative. Allergic/Immunologic: Negative. Neurological:  Positive for headaches. Hematological: Negative. Psychiatric/Behavioral: Negative. All other systems reviewed and are negative. Vitals:    11/06/22 0419   BP: (!) 151/100   Pulse: 73   Resp: 18   Temp: 98.3 °F (36.8 °C)   SpO2: 99%   Weight: 90.7 kg (200 lb)   Height: 5' 10\" (1.778 m)            Physical Exam  Vitals and nursing note reviewed. Constitutional:       General: He is not in acute distress. Appearance: He is well-developed. HENT:      Head: Normocephalic. Eyes:      Conjunctiva/sclera: Conjunctivae normal.      Pupils: Pupils are equal, round, and reactive to light. Cardiovascular:      Rate and Rhythm: Normal rate and regular rhythm. Heart sounds: Normal heart sounds. No murmur heard. Pulmonary:      Effort: Pulmonary effort is normal. No respiratory distress. Breath sounds: Normal breath sounds. No wheezing or rales. Chest:      Chest wall: No tenderness. Abdominal:      General: Bowel sounds are normal. There is no distension. Palpations: Abdomen is soft. Tenderness: There is no abdominal tenderness. There is no rebound. Musculoskeletal:         General: No tenderness. Normal range of motion. Cervical back: Normal range of motion and neck supple. Skin:     General: Skin is warm and dry. Findings: No rash. Neurological:      Mental Status: He is alert and oriented to person, place, and time. Cranial Nerves: No cranial nerve deficit. Motor: No abnormal muscle tone. Coordination: Coordination normal.   Psychiatric:         Behavior: Behavior normal.         Thought Content: Thought content normal.         Judgment: Judgment normal.        MDM         Procedures    Diagnosis: Flu, COVID-19, viral syndrome, anxiety    Hospital course: Patient had a COVID test and flu test drawn.     Disposition: Patient discharged home. He is babatunde follow-up results of is COVID test on MY chart. If his  COVID test is positive he is toself quarantine for 5 days. He is to treat with  with Motrin and Tylenol for flulike symptoms. Patient follow-up with PCP in 2 to 3 days.

## 2022-11-06 NOTE — Clinical Note
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 Per the CDC guidelines we recommend home isolation until the following conditions are all met:    1. At least five days have passed since symptoms first appeared and/or had a close exposure,   2. After home isolation for five days, wearing a mask around others for the next five days,  3. At least 24 have passed since last fever without the use of fever-reducing medications and  4.  Symptoms (eg cough, shortness of breath) have improved      Sincerely,          Keisha Marie RN

## 2022-11-06 NOTE — Clinical Note
[address] [facility] EMERGENCY DEPT  [address] [address]  [phone]    Work/School Note    Date: 11/6/2022    To Whom It May concern:    Benjamin Gomez was seen and treated today in the emergency room by the following provider(s):  Attending Provider: Elizabeth Parra MD.      Benjamin Gomez is excused from work/school on 11/6/2022 through 11/8/2022. He is medically clear to return to work/school on 11/9/2022.          Sincerely,          Antione Marie RN

## 2022-11-06 NOTE — LETTER
[address] [facility] EMERGENCY DEPT  [address] [address] [phone]    Work/School Note    Date: 11/6/2022    To Whom It May concern:    Jaja Carrero was seen and treated today in the emergency room by the following provider(s):  Attending Provider: Chloe Pham MD.      Jaja Carrero is excused from work/school on 11/6/2022 through 11/8/2022. He is medically clear to return to work/school on 11/9/2022.          Sincerely,          Wendy Wilde MD

## 2022-11-07 LAB — SARS-COV-2, NAA: NOT DETECTED

## 2022-11-09 DIAGNOSIS — F90.2 ATTENTION DEFICIT HYPERACTIVITY DISORDER (ADHD), COMBINED TYPE: ICD-10-CM

## 2022-11-09 DIAGNOSIS — M94.0 COSTOCHONDRITIS: ICD-10-CM

## 2022-11-09 RX ORDER — MONTELUKAST SODIUM 10 MG/1
10 TABLET ORAL DAILY
Qty: 90 TABLET | Refills: 1 | Status: SHIPPED | OUTPATIENT
Start: 2022-11-09

## 2022-11-09 RX ORDER — IBUPROFEN 800 MG/1
800 TABLET ORAL
Qty: 90 TABLET | Refills: 0 | Status: SHIPPED | OUTPATIENT
Start: 2022-11-09

## 2022-11-14 ENCOUNTER — TELEPHONE (OUTPATIENT)
Dept: INTERNAL MEDICINE CLINIC | Age: 37
End: 2022-11-14

## 2022-11-14 DIAGNOSIS — F90.2 ATTENTION DEFICIT HYPERACTIVITY DISORDER (ADHD), COMBINED TYPE: Primary | ICD-10-CM

## 2022-11-15 ENCOUNTER — PATIENT MESSAGE (OUTPATIENT)
Dept: INTERNAL MEDICINE CLINIC | Age: 37
End: 2022-11-15

## 2022-11-15 DIAGNOSIS — F90.2 ATTENTION DEFICIT HYPERACTIVITY DISORDER (ADHD), COMBINED TYPE: ICD-10-CM

## 2022-11-15 NOTE — TELEPHONE ENCOUNTER
Pt returned call and given the message sent to his my chart.    He will get uds done this coming Saturday at [facility]

## 2022-11-17 ENCOUNTER — HOSPITAL ENCOUNTER (OUTPATIENT)
Dept: LAB | Age: 37
Discharge: HOME | End: 2022-11-17

## 2022-11-17 LAB — SENTARA SPECIMEN COL,SENBCF: NORMAL

## 2022-11-17 PROCEDURE — 99001 SPECIMEN HANDLING PT-LAB: CPT

## 2022-11-19 LAB
AMPHETAMINES, URINE: NEGATIVE NG/ML
BARBITUATES  (DRUG PANEL 11), 10870: NEGATIVE NG/ML
BENZODIAZEPINES  (DRUG PANEL 11), 10871: NEGATIVE NG/ML
CANNABINOID URINE: NEGATIVE NG/ML
COCAINE (METABOLITE), 796668: NEGATIVE NG/ML
CREATININE URINE,3297223: 123.3 MG/DL (ref 20–300)
FENTANYL  (DRUG PANEL 11), 10954: NEGATIVE PG/ML
METHADONE  (DRUG PANEL 11), 10952: NEGATIVE NG/ML
OPIATES  (DRUG PANEL 11), 10874: NEGATIVE NG/ML
OXYCODONE/OXYMORPHONE   (DRUG PANEL 11), 10950: NEGATIVE NG/ML
PH UR: 6.1 [PH] (ref 4.5–8.9)
PHENCYCLIDINE URINE: NEGATIVE NG/ML
PROPOXYPHENE URINE: NEGATIVE NG/ML

## 2022-11-29 DIAGNOSIS — F90.2 ATTENTION DEFICIT HYPERACTIVITY DISORDER (ADHD), COMBINED TYPE: ICD-10-CM

## 2022-11-29 DIAGNOSIS — J30.89 ENVIRONMENTAL AND SEASONAL ALLERGIES: ICD-10-CM

## 2022-11-29 RX ORDER — FLUTICASONE PROPIONATE 50 MCG
2 SPRAY, SUSPENSION (ML) NASAL
Qty: 1 EACH | Refills: 5 | Status: SHIPPED | OUTPATIENT
Start: 2022-11-29

## 2022-11-30 ENCOUNTER — TRANSCRIBE ORDER (OUTPATIENT)
Dept: SCHEDULING | Age: 37
End: 2022-11-30

## 2022-11-30 ENCOUNTER — TELEPHONE (OUTPATIENT)
Dept: INTERNAL MEDICINE CLINIC | Age: 37
End: 2022-11-30

## 2022-11-30 DIAGNOSIS — S39.012D STRAIN OF MUSCLE, FASCIA AND TENDON OF LOWER BACK, SUBSEQUENT ENCOUNTER: Primary | ICD-10-CM

## 2022-11-30 NOTE — TELEPHONE ENCOUNTER
----- Message from Bruce Rodney sent at 11/30/2022 12:32 PM EST -----  Subject: Message to Provider    QUESTIONS  Information for Provider? Patient would like a call regarding his Vyvanse   Rx. Is is approved? He has been drug tested. Not seeing Dr. Pat Nino until   12/28 but would like a call about this asap.  ---------------------------------------------------------------------------  --------------  [id number]  [phone]; OK to leave message on voicemail  ---------------------------------------------------------------------------  --------------  SCRIPT ANSWERS  Relationship to Patient?  Self

## 2022-12-01 DIAGNOSIS — F90.2 ATTENTION DEFICIT HYPERACTIVITY DISORDER (ADHD), COMBINED TYPE: ICD-10-CM

## 2022-12-01 NOTE — TELEPHONE ENCOUNTER
Patient is aware medication was approved. Patient asked for a refill on V[facility]vanse. [facility]22. The last fill date for Vyvanse was 8/15/2022 for a 30 d/s qty 30      Last UDS: 11/17/2022    CSA Last signed: Not on file        PCP: Kat Cervantes MD    Last appt: 9/2/2022  Future Appointments   Date Time Provider Tono Pantojaisti   12/28/2022  8:40 AM Kat Cervantes MD [facility] BS AMB       Requested Prescriptions     Pending Prescriptions Disp Refills    lisdexamfetamine (VYVANSE) 50 mg cap 30 Capsule 0     Sig: Take 1 Capsule by mouth daily. Max Daily Amount: 50 mg.

## 2022-12-28 ENCOUNTER — OFFICE VISIT (OUTPATIENT)
Dept: INTERNAL MEDICINE CLINIC | Age: 37
End: 2022-12-28
Payer: MEDICAID

## 2022-12-28 VITALS
HEIGHT: 70 IN | OXYGEN SATURATION: 99 % | HEART RATE: 74 BPM | TEMPERATURE: 98.9 F | WEIGHT: 223 LBS | RESPIRATION RATE: 16 BRPM | DIASTOLIC BLOOD PRESSURE: 102 MMHG | SYSTOLIC BLOOD PRESSURE: 166 MMHG | BODY MASS INDEX: 31.92 KG/M2

## 2022-12-28 DIAGNOSIS — F90.2 ATTENTION DEFICIT HYPERACTIVITY DISORDER (ADHD), COMBINED TYPE: ICD-10-CM

## 2022-12-28 DIAGNOSIS — I10 ESSENTIAL HYPERTENSION: Primary | ICD-10-CM

## 2022-12-28 DIAGNOSIS — M54.9 UPPER BACK PAIN: ICD-10-CM

## 2022-12-28 PROCEDURE — 99214 OFFICE O/P EST MOD 30 MIN: CPT | Performed by: INTERNAL MEDICINE

## 2022-12-28 PROCEDURE — 3078F DIAST BP <80 MM HG: CPT | Performed by: INTERNAL MEDICINE

## 2022-12-28 PROCEDURE — 3074F SYST BP LT 130 MM HG: CPT | Performed by: INTERNAL MEDICINE

## 2022-12-28 RX ORDER — VALSARTAN 160 MG/1
160 TABLET ORAL DAILY
Qty: 90 TABLET | Refills: 1 | Status: SHIPPED | OUTPATIENT
Start: 2022-12-28

## 2022-12-28 NOTE — PROGRESS NOTES
Patient is in the office today for a follow up. Patient states since his winter break he has not been taking Vyvanse, singular, blood pressure medication and flonase. Patient states he has not been having episodes of depression. Patient states he has a discolored toenail. 1. \"Have you been to the ER, urgent care clinic since your last visit? Hospitalized since your last visit? \" No    2. \"Have you seen or consulted any other health care providers outside of the [address] [place] [address] since your last visit? \"  Yes, Dr. Sharron garibay      3. For patients aged 39-70: Has the patient had a colonoscopy / FIT/ Cologuard? NA - based on age      If the patient is female:    4. For patients aged 41-77: Has the patient had a mammogram within the past 2 years? NA - based on age or sex      11. For patients aged 21-65: Has the patient had a pap smear?  NA - based on age or sex
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
## 2023-01-07 ENCOUNTER — HOSPITAL ENCOUNTER (OUTPATIENT)
Age: 38
End: 2023-01-07
Attending: ORTHOPAEDIC SURGERY
Payer: OTHER GOVERNMENT

## 2023-01-07 DIAGNOSIS — S39.012D STRAIN OF MUSCLE, FASCIA AND TENDON OF LOWER BACK, SUBSEQUENT ENCOUNTER: ICD-10-CM

## 2023-01-07 PROCEDURE — 72148 MRI LUMBAR SPINE W/O DYE: CPT

## 2023-01-25 DIAGNOSIS — F90.2 ATTENTION DEFICIT HYPERACTIVITY DISORDER (ADHD), COMBINED TYPE: ICD-10-CM

## 2023-01-25 NOTE — TELEPHONE ENCOUNTER
[facility]/25/2023    The last fill date for Vyvanse was 12/1/2022 for a 30 d/s qty 30      Last UDS: 11/17/2022    CSA Last signed: not on file        PCP: Aston Pantoja MD    Last appt: 12/28/2022  Future Appointments   Date Time Provider Tono Evans   3/28/2023  3:20 PM Aston Pantoja MD [facility] BS AMB       Requested Prescriptions     Pending Prescriptions Disp Refills    lisdexamfetamine (VYVANSE) 50 mg cap 30 Capsule 0     Sig: Take 1 Capsule by mouth daily. Max Daily Amount: 50 mg.

## 2023-02-08 ENCOUNTER — TELEPHONE (OUTPATIENT)
Dept: INTERNAL MEDICINE CLINIC | Age: 38
End: 2023-02-08

## 2023-02-08 ENCOUNTER — HOSPITAL ENCOUNTER (EMERGENCY)
Age: 38
Discharge: HOME | End: 2023-02-08
Attending: EMERGENCY MEDICINE
Payer: MEDICAID

## 2023-02-08 VITALS
TEMPERATURE: 98.1 F | DIASTOLIC BLOOD PRESSURE: 96 MMHG | RESPIRATION RATE: 18 BRPM | SYSTOLIC BLOOD PRESSURE: 143 MMHG | OXYGEN SATURATION: 100 % | HEART RATE: 87 BPM

## 2023-02-08 DIAGNOSIS — R53.1 WEAKNESS: Primary | ICD-10-CM

## 2023-02-08 LAB
ALBUMIN SERPL-MCNC: 4.1 G/DL (ref 3.4–5)
ALBUMIN/GLOB SERPL: 1.1 (ref 0.8–1.7)
ALP SERPL-CCNC: 75 U/L (ref 45–117)
ALT SERPL-CCNC: 37 U/L (ref 16–61)
ANION GAP SERPL CALC-SCNC: 6 MMOL/L (ref 3–18)
AST SERPL-CCNC: 26 U/L (ref 10–38)
BASOPHILS # BLD: 0 K/UL (ref 0–0.1)
BASOPHILS NFR BLD: 1 % (ref 0–2)
BILIRUB SERPL-MCNC: 0.3 MG/DL (ref 0.2–1)
BUN SERPL-MCNC: 13 MG/DL (ref 7–18)
BUN/CREAT SERPL: 10 (ref 12–20)
CALCIUM SERPL-MCNC: 9.6 MG/DL (ref 8.5–10.1)
CHLORIDE SERPL-SCNC: 105 MMOL/L (ref 100–111)
CO2 SERPL-SCNC: 30 MMOL/L (ref 21–32)
CREAT SERPL-MCNC: 1.35 MG/DL (ref 0.6–1.3)
DIFFERENTIAL METHOD BLD: ABNORMAL
EOSINOPHIL # BLD: 0.1 K/UL (ref 0–0.4)
EOSINOPHIL NFR BLD: 2 % (ref 0–5)
ERYTHROCYTE [DISTWIDTH] IN BLOOD BY AUTOMATED COUNT: 13 % (ref 11.6–14.5)
GLOBULIN SER CALC-MCNC: 3.7 G/DL (ref 2–4)
GLUCOSE SERPL-MCNC: 91 MG/DL (ref 74–99)
HCT VFR BLD AUTO: 42.7 % (ref 36–48)
HGB BLD-MCNC: 14.4 G/DL (ref 13–16)
IMM GRANULOCYTES # BLD AUTO: 0 K/UL (ref 0–0.04)
IMM GRANULOCYTES NFR BLD AUTO: 0 % (ref 0–0.5)
LYMPHOCYTES # BLD: 2.6 K/UL (ref 0.9–3.6)
LYMPHOCYTES NFR BLD: 56 % (ref 21–52)
MCH RBC QN AUTO: 30.9 PG (ref 24–34)
MCHC RBC AUTO-ENTMCNC: 33.7 G/DL (ref 31–37)
MCV RBC AUTO: 91.6 FL (ref 78–100)
MONOCYTES # BLD: 0.4 K/UL (ref 0.05–1.2)
MONOCYTES NFR BLD: 9 % (ref 3–10)
NEUTS SEG # BLD: 1.4 K/UL (ref 1.8–8)
NEUTS SEG NFR BLD: 32 % (ref 40–73)
NRBC # BLD: 0 K/UL (ref 0–0.01)
NRBC BLD-RTO: 0 PER 100 WBC
PLATELET # BLD AUTO: 236 K/UL (ref 135–420)
PMV BLD AUTO: 8.8 FL (ref 9.2–11.8)
POTASSIUM SERPL-SCNC: 4.1 MMOL/L (ref 3.5–5.5)
PROT SERPL-MCNC: 7.8 G/DL (ref 6.4–8.2)
RBC # BLD AUTO: 4.66 M/UL (ref 4.35–5.65)
SODIUM SERPL-SCNC: 141 MMOL/L (ref 136–145)
TROPONIN I SERPL HS-MCNC: 3 NG/L (ref 0–78)
WBC # BLD AUTO: 4.5 K/UL (ref 4.6–13.2)

## 2023-02-08 PROCEDURE — 96374 THER/PROPH/DIAG INJ IV PUSH: CPT

## 2023-02-08 PROCEDURE — 84484 ASSAY OF TROPONIN QUANT: CPT

## 2023-02-08 PROCEDURE — 74011250636 HC RX REV CODE- 250/636: Performed by: EMERGENCY MEDICINE

## 2023-02-08 PROCEDURE — 96361 HYDRATE IV INFUSION ADD-ON: CPT

## 2023-02-08 PROCEDURE — 80053 COMPREHEN METABOLIC PANEL: CPT

## 2023-02-08 PROCEDURE — 85025 COMPLETE CBC W/AUTO DIFF WBC: CPT

## 2023-02-08 PROCEDURE — 99284 EMERGENCY DEPT VISIT MOD MDM: CPT

## 2023-02-08 RX ORDER — KETOROLAC TROMETHAMINE 15 MG/ML
15 INJECTION, SOLUTION INTRAMUSCULAR; INTRAVENOUS
Status: COMPLETED | OUTPATIENT
Start: 2023-02-08 | End: 2023-02-08

## 2023-02-08 RX ADMIN — SODIUM CHLORIDE 1000 ML: 900 INJECTION, SOLUTION INTRAVENOUS at 12:30

## 2023-02-08 RX ADMIN — KETOROLAC TROMETHAMINE 15 MG: 15 INJECTION, SOLUTION INTRAMUSCULAR; INTRAVENOUS at 13:50

## 2023-02-08 NOTE — Clinical Note
[address] [place] [address] [facility]  [address] [address] [phone]    Work/School Note    Date: 2/8/2023    To Whom It May concern:      Kenji Rodriguez was seen and treated today in the emergency room by the following provider(s):  No providers found. Kenji Rodriguez is excused from work/school on 02/08/23. He is clear to return to work/school on 02/09/23.         Sincerely,          Misa Corrales MD

## 2023-02-08 NOTE — TELEPHONE ENCOUNTER
Patient called call transferred from answering service for triage of the symptoms below. Advised patient of  recommendations below. Patient verbalized understanding. No further questions at this time.

## 2023-02-08 NOTE — TELEPHONE ENCOUNTER
Answering service called 8:44 AM 2/8/23 with patient having chest pains, headaches and very weak. He was seen at [facility] on 2/6/23. If he is not feeling better or has not been tested for COVID he should go back to ER to be revaluated.

## 2023-02-08 NOTE — ED TRIAGE NOTES
Patient is ambulatory into triage for c/o body aches, headaches, abd pain, and back pain x 3 days. Patient was seen at [facility] for dizziness and cp on 6th. Blood work, x ray, ekg all negative. NAD noted.

## 2023-02-08 NOTE — ED NOTES
I have reviewed discharge instructions with the patient. The patient verbalized understanding. Patient armband removed and shredded.  List of therapists given to pt per his request

## 2023-02-08 NOTE — ED PROVIDER NOTES
31-year-old male with past medical history of hypertension presents emergency department multiple medical complaints. He has chest pain congestion weakness and fatigue. Patient went to another facility recently and was worked up and got a chest x-ray EKG blood work which was all normal discharge home. He said IV fluids made him feel better. Patient feels that the blood pressure medication he was on last week is the reason he feels this way. He called Dr. Mike Lugo office today and was told to go to the emergency department for his symptoms. Patient has no other complaints.        Past Medical History:   Diagnosis Date    Anemia     Condyloma acuminatum     Environmental allergies     Groin pain     Obstructive sleep apnea on CPAP     CATARINA (obstructive sleep apnea)     Plantar fasciitis     Sciatica     Sinusitis     Tinea pedis        Past Surgical History:   Procedure Laterality Date    HX OTHER SURGICAL      dental         Family History:   Problem Relation Age of Onset    Hypertension Mother     Diabetes Mother     Hypertension Father        Social History     Socioeconomic History    Marital status:      Spouse name: Not on file    Number of children: Not on file    Years of education: Not on file    Highest education level: Not on file   Occupational History    Not on file   Tobacco Use    Smoking status: Never    Smokeless tobacco: Never   Substance and Sexual Activity    Alcohol use: No    Drug use: Not Currently    Sexual activity: Not on file   Other Topics Concern    Not on file   Social History Narrative    Not on file     Social Determinants of Health     Financial Resource Strain: Not on file   Food Insecurity: Not on file   Transportation Needs: Not on file   Physical Activity: Not on file   Stress: Not on file   Social Connections: Not on file   Intimate Partner Violence: Not on file   Housing Stability: Not on file         ALLERGIES: Penicillins, Food [peanut], and Mold extracts    Review of Systems   Constitutional:  Positive for fatigue. Respiratory:  Positive for chest tightness. Cardiovascular:  Positive for chest pain. Neurological:  Positive for weakness. All other systems reviewed and are negative. Vitals:    02/08/23 1046 02/08/23 1048   BP: (!) 143/96    Pulse: 87    Resp: 18    Temp:  98.1 °F (36.7 °C)   SpO2: 100%             Physical Exam  Vitals and nursing note reviewed. Constitutional:       General: He is not in acute distress. Appearance: He is well-developed. He is not diaphoretic. HENT:      Head: Normocephalic and atraumatic. Eyes:      General: No scleral icterus. Right eye: No discharge. Left eye: No discharge. Conjunctiva/sclera: Conjunctivae normal.      Pupils: Pupils are equal, round, and reactive to light. Neck:      Vascular: No JVD. Cardiovascular:      Rate and Rhythm: Normal rate and regular rhythm. Pulmonary:      Effort: Pulmonary effort is normal. No respiratory distress. Breath sounds: Normal breath sounds. No wheezing. Abdominal:      Palpations: Abdomen is soft. There is no mass. Tenderness: There is no abdominal tenderness. There is no right CVA tenderness, left CVA tenderness, guarding or rebound. Negative signs include Perez's sign, Rovsing's sign, McBurney's sign and obturator sign. Hernia: No hernia is present. Musculoskeletal:         General: Normal range of motion. Cervical back: Normal range of motion and neck supple. Skin:     General: Skin is warm and dry. Capillary Refill: Capillary refill takes less than 2 seconds. Neurological:      Mental Status: He is alert and oriented to person, place, and time. Medical Decision Making  45-year-old well-appearing male with history of hypertension and ADHD presents emergency department with weakness. I gave him IV fluids and Toradol he feels better. I do not think this is ACS. Do not think it is a viral syndrome.   Vital signs are normal except for slightly elevated blood pressure. Will discharge home and follow-up with Dr. Vicki Beltran. DDX  weakness, viral syndrome, drug reaction. Amount and/or Complexity of Data Reviewed  Labs: ordered. Risk  Prescription drug management.            Procedures

## 2023-02-09 NOTE — TELEPHONE ENCOUNTER
Patient called back and states he went to the ED yesterday and the results of the testing they did all came back inconclusive and they told him to follow up with his pcp as soon as possible. No appts available, please advise. Patient can be reached at [phone].  Thank you

## 2023-02-10 ENCOUNTER — OFFICE VISIT (OUTPATIENT)
Dept: INTERNAL MEDICINE CLINIC | Age: 38
End: 2023-02-10
Payer: MEDICAID

## 2023-02-10 VITALS
HEIGHT: 70 IN | BODY MASS INDEX: 30.64 KG/M2 | OXYGEN SATURATION: 99 % | RESPIRATION RATE: 20 BRPM | TEMPERATURE: 98.3 F | SYSTOLIC BLOOD PRESSURE: 132 MMHG | HEART RATE: 84 BPM | WEIGHT: 214 LBS | DIASTOLIC BLOOD PRESSURE: 89 MMHG

## 2023-02-10 DIAGNOSIS — I10 ESSENTIAL HYPERTENSION: Primary | ICD-10-CM

## 2023-02-10 DIAGNOSIS — F41.9 ANXIETY: ICD-10-CM

## 2023-02-10 DIAGNOSIS — F33.1 MODERATE EPISODE OF RECURRENT MAJOR DEPRESSIVE DISORDER (HCC): ICD-10-CM

## 2023-02-10 PROCEDURE — 3078F DIAST BP <80 MM HG: CPT | Performed by: INTERNAL MEDICINE

## 2023-02-10 PROCEDURE — 3074F SYST BP LT 130 MM HG: CPT | Performed by: INTERNAL MEDICINE

## 2023-02-10 PROCEDURE — 99213 OFFICE O/P EST LOW 20 MIN: CPT | Performed by: INTERNAL MEDICINE

## 2023-02-10 NOTE — PROGRESS NOTES
Patient is in the office today for a ED follow up. Patient states he stopped Valsartan because of chest pain, abd pain, headache, and Jittery, and felt fatigued. 1. \"Have you been to the ER, urgent care clinic since your last visit? Hospitalized since your last visit? \"  Yes, [facility] and [address] [place] [address] ED    2. \"Have you seen or consulted any other health care providers outside of the [address] [place] [address] since your last visit? \" No     3. For patients aged 39-70: Has the patient had a colonoscopy / FIT/ Cologuard? NA - based on age      If the patient is female:    4. For patients aged 41-77: Has the patient had a mammogram within the past 2 years? NA - based on age or sex      11. For patients aged 21-65: Has the patient had a pap smear?  NA - based on age or sex

## 2023-02-10 NOTE — LETTER
NOTIFICATION RETURN TO WORK / SCHOOL    2/10/2023 8:27 AM    Mr. Josette Virk  [address] [id number]      To Whom It May Concern:    Josette Virk is currently under the care of Rory Franco. He will be out from 2/9/2023    He will return to work/school on: 2/13/2023    If there are questions or concerns please have the patient contact our office.         Sincerely,      Carrie Nielson MD

## 2023-02-16 NOTE — PROGRESS NOTES
Bisi Bergman is a 30 y.o.  male and presents with ED Follow-up [facility] ED [facility] ED), Hypertension ( ), and Anxiety      SUBJECTIVE:    Pt has h/o HTN but was not taking his Diovan until about 1 week ago when he decided to take it for the first time. He began to feel chest pain and dizziness and went to ER 2/8/23 where he had a negative evaluation. He comes in today for f/up and is doing better but is under a lot of personal stressors. He is going to try ad f/up with a therapist for the stressors. For his HTN he will try to monitor his BP and exercise and lose weight in order to control it since he does not tolerate medications well. He continues on Vyvanse for his ADD with good results. Respiratory ROS: negative for - shortness of breath  Cardiovascular ROS: negative for - chest pain    Current Outpatient Medications   Medication Sig    lisdexamfetamine (VYVANSE) 50 mg cap Take 1 Capsule by mouth daily. Max Daily Amount: 50 mg.    montelukast (SINGULAIR) 10 mg tablet Take 1 Tablet by mouth daily. ibuprofen (MOTRIN) 800 mg tablet Take 1 Tablet by mouth every eight (8) hours as needed for Pain. fluticasone propionate (Flonase Allergy Relief) 50 mcg/actuation nasal spray 2 Sprays by Both Nostrils route daily as needed for Rhinitis or Allergies. (Patient not taking: Reported on 2/10/2023)    OTHER 3 UNSPECIFIED. Organic Men's Multi vit 3 gummies seferino (Patient not taking: No sig reported)    levocetirizine (XYZAL) 5 mg tablet Take  by mouth. (Patient not taking: Reported on 2/10/2023)     No current facility-administered medications for this visit.          OBJECTIVE:  oriented to person, place, and time and anxious  Visit Vitals  /89 (BP 1 Location: Right arm, BP Patient Position: Sitting, BP Cuff Size: Adult)   Pulse 84   Temp 98.3 °F (36.8 °C) (Temporal)   Resp 20   Ht 5' 10\" (1.778 m)   Wt 214 lb (97.1 kg)   SpO2 99%   BMI 30.71 kg/m²      well developed and well nourished  Chest - clear to auscultation, no wheezes, rales or rhonchi, symmetric air entry  Heart - normal rate, regular rhythm, normal S1, S2, no murmurs, rubs, clicks or gallops  Extremities - peripheral pulses normal, no pedal edema, no clubbing or cyanosis            Assessment/Plan      ICD-10-CM ICD-9-CM    1. Essential hypertension  I10 401.9 Borderline controlled. Pt to monitor BP and stay off medication for now and try to control it with diet and exercise. 2. Moderate episode of recurrent major depressive disorder (HCC)  F33.1 296.32 Pt to f/up with therapist to help with coping skills. 3. Anxiety  F41.9 300.00 Pt to f/up with therapist to help with coping skills. Follow-up and Dispositions    Return if symptoms worsen or fail to improve. Reviewed plan of care. Patient has provided input and agrees with goals.

## 2023-02-22 ENCOUNTER — PREP FOR PROCEDURE (OUTPATIENT)
Dept: DENTISTRY | Age: 38
End: 2023-02-22

## 2023-03-14 ENCOUNTER — TELEPHONE (OUTPATIENT)
Age: 38
End: 2023-03-14

## 2023-03-14 DIAGNOSIS — F90.2 ATTENTION-DEFICIT HYPERACTIVITY DISORDER, COMBINED TYPE: Primary | ICD-10-CM

## 2023-04-26 ENCOUNTER — TELEPHONE (OUTPATIENT)
Age: 38
End: 2023-04-26

## 2023-04-26 DIAGNOSIS — F90.2 ATTENTION-DEFICIT HYPERACTIVITY DISORDER, COMBINED TYPE: ICD-10-CM

## 2023-04-26 NOTE — TELEPHONE ENCOUNTER
Patient is requesting refill be sent to [facility].     lisdexamfetamine (VYVANSE) 50 MG capsule 30 capsule 0 3/14/2023 4/13/2023    Sig - Route: Take 1 capsule by mouth daily for 30 days. Max Daily Amount: 50 mg - Oral    Sent to pharmacy as: Lisdexamfetamine Dimesylate 50 MG Oral Capsule (VYVANSE)    Earliest Fill Date: 3/14/2023    E-Prescribing Status: Receipt confirmed by pharmacy (3/14/2023  8:46 PM EDT)      levocetirizine (XYZAL) 5 mg tablet        Sig - Route:  Take  by mouth. - Oral    Patient not taking: Reported on 2/10/2023        Class: Historical Med

## 2023-04-27 RX ORDER — LEVOCETIRIZINE DIHYDROCHLORIDE 5 MG/1
5 TABLET, FILM COATED ORAL NIGHTLY
Qty: 30 TABLET | Refills: 3 | Status: SHIPPED | OUTPATIENT
Start: 2023-04-27

## 2023-05-01 ENCOUNTER — OFFICE VISIT (OUTPATIENT)
Age: 38
End: 2023-05-01
Payer: COMMERCIAL

## 2023-05-01 VITALS
RESPIRATION RATE: 20 BRPM | TEMPERATURE: 98.7 F | OXYGEN SATURATION: 97 % | WEIGHT: 210 LBS | DIASTOLIC BLOOD PRESSURE: 111 MMHG | HEART RATE: 93 BPM | BODY MASS INDEX: 30.06 KG/M2 | SYSTOLIC BLOOD PRESSURE: 164 MMHG | HEIGHT: 70 IN

## 2023-05-01 DIAGNOSIS — M79.672 LEFT FOOT PAIN: ICD-10-CM

## 2023-05-01 DIAGNOSIS — M54.9 UPPER BACK PAIN: ICD-10-CM

## 2023-05-01 DIAGNOSIS — F90.2 ATTENTION-DEFICIT HYPERACTIVITY DISORDER, COMBINED TYPE: ICD-10-CM

## 2023-05-01 DIAGNOSIS — S46.812D STRAIN OF LEFT TRAPEZIUS MUSCLE, SUBSEQUENT ENCOUNTER: ICD-10-CM

## 2023-05-01 DIAGNOSIS — I10 ESSENTIAL (PRIMARY) HYPERTENSION: Primary | ICD-10-CM

## 2023-05-01 PROCEDURE — 3074F SYST BP LT 130 MM HG: CPT | Performed by: INTERNAL MEDICINE

## 2023-05-01 PROCEDURE — 99211 OFF/OP EST MAY X REQ PHY/QHP: CPT | Performed by: INTERNAL MEDICINE

## 2023-05-01 PROCEDURE — 99214 OFFICE O/P EST MOD 30 MIN: CPT | Performed by: INTERNAL MEDICINE

## 2023-05-01 PROCEDURE — 3078F DIAST BP <80 MM HG: CPT | Performed by: INTERNAL MEDICINE

## 2023-05-01 RX ORDER — AMLODIPINE BESYLATE 5 MG/1
5 TABLET ORAL DAILY
Qty: 30 TABLET | Refills: 3 | Status: SHIPPED | OUTPATIENT
Start: 2023-05-01

## 2023-05-01 SDOH — ECONOMIC STABILITY: INCOME INSECURITY: HOW HARD IS IT FOR YOU TO PAY FOR THE VERY BASICS LIKE FOOD, HOUSING, MEDICAL CARE, AND HEATING?: NOT HARD AT ALL

## 2023-05-01 SDOH — ECONOMIC STABILITY: HOUSING INSECURITY
IN THE LAST 12 MONTHS, WAS THERE A TIME WHEN YOU DID NOT HAVE A STEADY PLACE TO SLEEP OR SLEPT IN A SHELTER (INCLUDING NOW)?: NO

## 2023-05-01 SDOH — ECONOMIC STABILITY: FOOD INSECURITY: WITHIN THE PAST 12 MONTHS, YOU WORRIED THAT YOUR FOOD WOULD RUN OUT BEFORE YOU GOT MONEY TO BUY MORE.: NEVER TRUE

## 2023-05-01 SDOH — ECONOMIC STABILITY: FOOD INSECURITY: WITHIN THE PAST 12 MONTHS, THE FOOD YOU BOUGHT JUST DIDN'T LAST AND YOU DIDN'T HAVE MONEY TO GET MORE.: NEVER TRUE

## 2023-05-01 ASSESSMENT — PATIENT HEALTH QUESTIONNAIRE - PHQ9
4. FEELING TIRED OR HAVING LITTLE ENERGY: 1
2. FEELING DOWN, DEPRESSED OR HOPELESS: 1
9. THOUGHTS THAT YOU WOULD BE BETTER OFF DEAD, OR OF HURTING YOURSELF: 0
SUM OF ALL RESPONSES TO PHQ QUESTIONS 1-9: 3
SUM OF ALL RESPONSES TO PHQ QUESTIONS 1-9: 3
7. TROUBLE CONCENTRATING ON THINGS, SUCH AS READING THE NEWSPAPER OR WATCHING TELEVISION: 0
6. FEELING BAD ABOUT YOURSELF - OR THAT YOU ARE A FAILURE OR HAVE LET YOURSELF OR YOUR FAMILY DOWN: 0
8. MOVING OR SPEAKING SO SLOWLY THAT OTHER PEOPLE COULD HAVE NOTICED. OR THE OPPOSITE, BEING SO FIGETY OR RESTLESS THAT YOU HAVE BEEN MOVING AROUND A LOT MORE THAN USUAL: 0
SUM OF ALL RESPONSES TO PHQ QUESTIONS 1-9: 3
SUM OF ALL RESPONSES TO PHQ9 QUESTIONS 1 & 2: 1
5. POOR APPETITE OR OVEREATING: 0
10. IF YOU CHECKED OFF ANY PROBLEMS, HOW DIFFICULT HAVE THESE PROBLEMS MADE IT FOR YOU TO DO YOUR WORK, TAKE CARE OF THINGS AT HOME, OR GET ALONG WITH OTHER PEOPLE: 0
3. TROUBLE FALLING OR STAYING ASLEEP: 1
1. LITTLE INTEREST OR PLEASURE IN DOING THINGS: 0
SUM OF ALL RESPONSES TO PHQ QUESTIONS 1-9: 3
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
## 2023-06-09 ENCOUNTER — TELEPHONE (OUTPATIENT)
Age: 38
End: 2023-06-09

## 2023-06-09 DIAGNOSIS — F90.2 ATTENTION-DEFICIT HYPERACTIVITY DISORDER, COMBINED TYPE: ICD-10-CM

## 2023-07-18 ENCOUNTER — TELEPHONE (OUTPATIENT)
Age: 38
End: 2023-07-18

## 2023-07-18 DIAGNOSIS — F90.2 ATTENTION-DEFICIT HYPERACTIVITY DISORDER, COMBINED TYPE: ICD-10-CM

## 2023-07-18 NOTE — TELEPHONE ENCOUNTER
Please refill and send to [facility]/PHARMACY #[id number]- [place], [place] - [facility] [phone]    lisdexamfetamine (VYVANSE) 50 MG capsule

## 2023-08-29 ENCOUNTER — OFFICE VISIT (OUTPATIENT)
Age: 38
End: 2023-08-29
Payer: COMMERCIAL

## 2023-08-29 VITALS
SYSTOLIC BLOOD PRESSURE: 157 MMHG | BODY MASS INDEX: 29.49 KG/M2 | HEART RATE: 92 BPM | WEIGHT: 206 LBS | TEMPERATURE: 97.8 F | RESPIRATION RATE: 18 BRPM | OXYGEN SATURATION: 99 % | HEIGHT: 70 IN | DIASTOLIC BLOOD PRESSURE: 94 MMHG

## 2023-08-29 DIAGNOSIS — I10 PRIMARY HYPERTENSION: Primary | ICD-10-CM

## 2023-08-29 DIAGNOSIS — F41.9 ANXIETY: ICD-10-CM

## 2023-08-29 DIAGNOSIS — G47.33 OBSTRUCTIVE SLEEP APNEA SYNDROME: ICD-10-CM

## 2023-08-29 DIAGNOSIS — F90.2 ATTENTION-DEFICIT HYPERACTIVITY DISORDER, COMBINED TYPE: ICD-10-CM

## 2023-08-29 PROCEDURE — 3077F SYST BP >= 140 MM HG: CPT | Performed by: INTERNAL MEDICINE

## 2023-08-29 PROCEDURE — 3080F DIAST BP >= 90 MM HG: CPT | Performed by: INTERNAL MEDICINE

## 2023-08-29 PROCEDURE — 99211 OFF/OP EST MAY X REQ PHY/QHP: CPT

## 2023-08-29 PROCEDURE — 99214 OFFICE O/P EST MOD 30 MIN: CPT | Performed by: INTERNAL MEDICINE

## 2023-08-29 RX ORDER — LEVOCETIRIZINE DIHYDROCHLORIDE 5 MG/1
5 TABLET, FILM COATED ORAL NIGHTLY
Qty: 30 TABLET | Refills: 3 | Status: SHIPPED | OUTPATIENT
Start: 2023-08-29

## 2023-08-29 RX ORDER — DOCOSANOL 100 MG/G
CREAM TOPICAL
Qty: 2 G | Refills: 5 | COMMUNITY
Start: 2023-08-29 | End: 2023-08-29 | Stop reason: SDUPTHER

## 2023-08-29 RX ORDER — DOCOSANOL 100 MG/G
CREAM TOPICAL
Qty: 2 G | Refills: 5 | Status: SHIPPED | OUTPATIENT
Start: 2023-08-29

## 2023-08-29 RX ORDER — DOCOSANOL 100 MG/G
CREAM TOPICAL
COMMUNITY
Start: 2023-08-02 | End: 2023-08-29 | Stop reason: SDUPTHER

## 2023-08-29 RX ORDER — MONTELUKAST SODIUM 10 MG/1
10 TABLET ORAL DAILY
Qty: 30 TABLET | Refills: 5 | Status: SHIPPED | OUTPATIENT
Start: 2023-08-29

## 2023-08-29 SDOH — ECONOMIC STABILITY: FOOD INSECURITY: WITHIN THE PAST 12 MONTHS, THE FOOD YOU BOUGHT JUST DIDN'T LAST AND YOU DIDN'T HAVE MONEY TO GET MORE.: NEVER TRUE

## 2023-08-29 SDOH — ECONOMIC STABILITY: INCOME INSECURITY: HOW HARD IS IT FOR YOU TO PAY FOR THE VERY BASICS LIKE FOOD, HOUSING, MEDICAL CARE, AND HEATING?: NOT HARD AT ALL

## 2023-08-29 SDOH — ECONOMIC STABILITY: FOOD INSECURITY: WITHIN THE PAST 12 MONTHS, YOU WORRIED THAT YOUR FOOD WOULD RUN OUT BEFORE YOU GOT MONEY TO BUY MORE.: NEVER TRUE

## 2023-08-29 ASSESSMENT — PATIENT HEALTH QUESTIONNAIRE - PHQ9
2. FEELING DOWN, DEPRESSED OR HOPELESS: 0
3. TROUBLE FALLING OR STAYING ASLEEP: 0
4. FEELING TIRED OR HAVING LITTLE ENERGY: 0
6. FEELING BAD ABOUT YOURSELF - OR THAT YOU ARE A FAILURE OR HAVE LET YOURSELF OR YOUR FAMILY DOWN: 0
10. IF YOU CHECKED OFF ANY PROBLEMS, HOW DIFFICULT HAVE THESE PROBLEMS MADE IT FOR YOU TO DO YOUR WORK, TAKE CARE OF THINGS AT HOME, OR GET ALONG WITH OTHER PEOPLE: 0
SUM OF ALL RESPONSES TO PHQ QUESTIONS 1-9: 0
9. THOUGHTS THAT YOU WOULD BE BETTER OFF DEAD, OR OF HURTING YOURSELF: 0
1. LITTLE INTEREST OR PLEASURE IN DOING THINGS: 0
5. POOR APPETITE OR OVEREATING: 0
SUM OF ALL RESPONSES TO PHQ QUESTIONS 1-9: 0
7. TROUBLE CONCENTRATING ON THINGS, SUCH AS READING THE NEWSPAPER OR WATCHING TELEVISION: 0
SUM OF ALL RESPONSES TO PHQ QUESTIONS 1-9: 0
SUM OF ALL RESPONSES TO PHQ9 QUESTIONS 1 & 2: 0
8. MOVING OR SPEAKING SO SLOWLY THAT OTHER PEOPLE COULD HAVE NOTICED. OR THE OPPOSITE, BEING SO FIGETY OR RESTLESS THAT YOU HAVE BEEN MOVING AROUND A LOT MORE THAN USUAL: 0
SUM OF ALL RESPONSES TO PHQ QUESTIONS 1-9: 0

## 2023-08-29 ASSESSMENT — ANXIETY QUESTIONNAIRES
1. FEELING NERVOUS, ANXIOUS, OR ON EDGE: 0
2. NOT BEING ABLE TO STOP OR CONTROL WORRYING: 0
4. TROUBLE RELAXING: 0
7. FEELING AFRAID AS IF SOMETHING AWFUL MIGHT HAPPEN: 0
3. WORRYING TOO MUCH ABOUT DIFFERENT THINGS: 0
6. BECOMING EASILY ANNOYED OR IRRITABLE: 0
GAD7 TOTAL SCORE: 0
5. BEING SO RESTLESS THAT IT IS HARD TO SIT STILL: 0
IF YOU CHECKED OFF ANY PROBLEMS ON THIS QUESTIONNAIRE, HOW DIFFICULT HAVE THESE PROBLEMS MADE IT FOR YOU TO DO YOUR WORK, TAKE CARE OF THINGS AT HOME, OR GET ALONG WITH OTHER PEOPLE: NOT DIFFICULT AT ALL

## 2023-09-03 ENCOUNTER — APPOINTMENT (OUTPATIENT)
Facility: HOSPITAL | Age: 38
End: 2023-09-03
Payer: COMMERCIAL

## 2023-09-03 ENCOUNTER — HOSPITAL ENCOUNTER (EMERGENCY)
Facility: HOSPITAL | Age: 38
Discharge: HOME | End: 2023-09-03
Attending: STUDENT IN AN ORGANIZED HEALTH CARE EDUCATION/TRAINING PROGRAM
Payer: COMMERCIAL

## 2023-09-03 VITALS
OXYGEN SATURATION: 90 % | DIASTOLIC BLOOD PRESSURE: 86 MMHG | HEIGHT: 70 IN | TEMPERATURE: 98.2 F | WEIGHT: 195 LBS | SYSTOLIC BLOOD PRESSURE: 150 MMHG | BODY MASS INDEX: 27.92 KG/M2 | HEART RATE: 82 BPM

## 2023-09-03 DIAGNOSIS — I10 HYPERTENSION, UNSPECIFIED TYPE: Primary | ICD-10-CM

## 2023-09-03 DIAGNOSIS — F41.1 ANXIETY STATE: ICD-10-CM

## 2023-09-03 LAB
ALBUMIN SERPL-MCNC: 4.4 G/DL (ref 3.4–5)
ALBUMIN/GLOB SERPL: 1.2 (ref 0.8–1.7)
ALP SERPL-CCNC: 73 U/L (ref 45–117)
ALT SERPL-CCNC: 65 U/L (ref 16–61)
ANION GAP SERPL CALC-SCNC: 6 MMOL/L (ref 3–18)
AST SERPL-CCNC: 32 U/L (ref 10–38)
BASOPHILS # BLD: 0 K/UL (ref 0–0.1)
BASOPHILS NFR BLD: 1 % (ref 0–2)
BILIRUB SERPL-MCNC: 0.6 MG/DL (ref 0.2–1)
BUN SERPL-MCNC: 10 MG/DL (ref 7–18)
BUN/CREAT SERPL: 8 (ref 12–20)
CALCIUM SERPL-MCNC: 9.5 MG/DL (ref 8.5–10.1)
CHLORIDE SERPL-SCNC: 101 MMOL/L (ref 100–111)
CO2 SERPL-SCNC: 31 MMOL/L (ref 21–32)
CREAT SERPL-MCNC: 1.28 MG/DL (ref 0.6–1.3)
DIFFERENTIAL METHOD BLD: ABNORMAL
EOSINOPHIL # BLD: 0 K/UL (ref 0–0.4)
EOSINOPHIL NFR BLD: 1 % (ref 0–5)
ERYTHROCYTE [DISTWIDTH] IN BLOOD BY AUTOMATED COUNT: 13.2 % (ref 11.6–14.5)
GLOBULIN SER CALC-MCNC: 3.7 G/DL (ref 2–4)
GLUCOSE SERPL-MCNC: 105 MG/DL (ref 74–99)
HCT VFR BLD AUTO: 42.7 % (ref 36–48)
HGB BLD-MCNC: 14.7 G/DL (ref 13–16)
IMM GRANULOCYTES # BLD AUTO: 0 K/UL (ref 0–0.04)
IMM GRANULOCYTES NFR BLD AUTO: 0 % (ref 0–0.5)
LYMPHOCYTES # BLD: 2.2 K/UL (ref 0.9–3.6)
LYMPHOCYTES NFR BLD: 36 % (ref 21–52)
MAGNESIUM SERPL-MCNC: 1.8 MG/DL (ref 1.6–2.6)
MCH RBC QN AUTO: 31.2 PG (ref 24–34)
MCHC RBC AUTO-ENTMCNC: 34.4 G/DL (ref 31–37)
MCV RBC AUTO: 90.7 FL (ref 78–100)
MONOCYTES # BLD: 0.5 K/UL (ref 0.05–1.2)
MONOCYTES NFR BLD: 8 % (ref 3–10)
NEUTS SEG # BLD: 3.3 K/UL (ref 1.8–8)
NEUTS SEG NFR BLD: 54 % (ref 40–73)
NRBC # BLD: 0 K/UL (ref 0–0.01)
NRBC BLD-RTO: 0 PER 100 WBC
PLATELET # BLD AUTO: 307 K/UL (ref 135–420)
PMV BLD AUTO: 9.1 FL (ref 9.2–11.8)
POTASSIUM SERPL-SCNC: 3.9 MMOL/L (ref 3.5–5.5)
PROT SERPL-MCNC: 8.1 G/DL (ref 6.4–8.2)
RBC # BLD AUTO: 4.71 M/UL (ref 4.35–5.65)
SODIUM SERPL-SCNC: 138 MMOL/L (ref 136–145)
TROPONIN I SERPL HS-MCNC: 7 NG/L (ref 0–78)
TROPONIN I SERPL HS-MCNC: 8 NG/L (ref 0–78)
TSH SERPL DL<=0.05 MIU/L-ACNC: 2.12 UIU/ML (ref 0.36–3.74)
WBC # BLD AUTO: 6.1 K/UL (ref 4.6–13.2)

## 2023-09-03 PROCEDURE — 96361 HYDRATE IV INFUSION ADD-ON: CPT

## 2023-09-03 PROCEDURE — 2580000003 HC RX 258: Performed by: STUDENT IN AN ORGANIZED HEALTH CARE EDUCATION/TRAINING PROGRAM

## 2023-09-03 PROCEDURE — 96374 THER/PROPH/DIAG INJ IV PUSH: CPT

## 2023-09-03 PROCEDURE — 99285 EMERGENCY DEPT VISIT HI MDM: CPT

## 2023-09-03 PROCEDURE — 80053 COMPREHEN METABOLIC PANEL: CPT

## 2023-09-03 PROCEDURE — 6370000000 HC RX 637 (ALT 250 FOR IP): Performed by: STUDENT IN AN ORGANIZED HEALTH CARE EDUCATION/TRAINING PROGRAM

## 2023-09-03 PROCEDURE — 83735 ASSAY OF MAGNESIUM: CPT

## 2023-09-03 PROCEDURE — 84443 ASSAY THYROID STIM HORMONE: CPT

## 2023-09-03 PROCEDURE — 6360000002 HC RX W HCPCS: Performed by: STUDENT IN AN ORGANIZED HEALTH CARE EDUCATION/TRAINING PROGRAM

## 2023-09-03 PROCEDURE — 93005 ELECTROCARDIOGRAM TRACING: CPT | Performed by: STUDENT IN AN ORGANIZED HEALTH CARE EDUCATION/TRAINING PROGRAM

## 2023-09-03 PROCEDURE — 85025 COMPLETE CBC W/AUTO DIFF WBC: CPT

## 2023-09-03 PROCEDURE — 84484 ASSAY OF TROPONIN QUANT: CPT

## 2023-09-03 PROCEDURE — 71046 X-RAY EXAM CHEST 2 VIEWS: CPT

## 2023-09-03 RX ORDER — AMLODIPINE BESYLATE 5 MG/1
5 TABLET ORAL DAILY
Qty: 30 TABLET | Refills: 1 | Status: SHIPPED | OUTPATIENT
Start: 2023-09-03

## 2023-09-03 RX ORDER — AMLODIPINE BESYLATE 10 MG/1
10 TABLET ORAL DAILY
Status: DISCONTINUED | OUTPATIENT
Start: 2023-09-04 | End: 2023-09-03

## 2023-09-03 RX ORDER — AMLODIPINE BESYLATE 10 MG/1
10 TABLET ORAL
Status: COMPLETED | OUTPATIENT
Start: 2023-09-03 | End: 2023-09-03

## 2023-09-03 RX ORDER — 0.9 % SODIUM CHLORIDE 0.9 %
1000 INTRAVENOUS SOLUTION INTRAVENOUS ONCE
Status: COMPLETED | OUTPATIENT
Start: 2023-09-03 | End: 2023-09-03

## 2023-09-03 RX ORDER — ONDANSETRON 2 MG/ML
4 INJECTION INTRAMUSCULAR; INTRAVENOUS ONCE
Status: COMPLETED | OUTPATIENT
Start: 2023-09-03 | End: 2023-09-03

## 2023-09-03 RX ORDER — ACETAMINOPHEN 500 MG
1000 TABLET ORAL
Status: COMPLETED | OUTPATIENT
Start: 2023-09-03 | End: 2023-09-03

## 2023-09-03 RX ADMIN — ONDANSETRON 4 MG: 2 INJECTION INTRAMUSCULAR; INTRAVENOUS at 22:06

## 2023-09-03 RX ADMIN — SODIUM CHLORIDE 1000 ML: 9 INJECTION, SOLUTION INTRAVENOUS at 22:07

## 2023-09-03 RX ADMIN — ACETAMINOPHEN 1000 MG: 500 TABLET ORAL at 22:06

## 2023-09-03 RX ADMIN — AMLODIPINE BESYLATE 10 MG: 10 TABLET ORAL at 22:06

## 2023-09-03 ASSESSMENT — PAIN - FUNCTIONAL ASSESSMENT: PAIN_FUNCTIONAL_ASSESSMENT: NONE - DENIES PAIN

## 2023-09-04 LAB
EKG ATRIAL RATE: 83 BPM
EKG DIAGNOSIS: NORMAL
EKG P AXIS: 41 DEGREES
EKG P-R INTERVAL: 160 MS
EKG Q-T INTERVAL: 364 MS
EKG QRS DURATION: 108 MS
EKG QTC CALCULATION (BAZETT): 427 MS
EKG R AXIS: 63 DEGREES
EKG T AXIS: 42 DEGREES
EKG VENTRICULAR RATE: 83 BPM

## 2023-09-04 PROCEDURE — 93010 ELECTROCARDIOGRAM REPORT: CPT | Performed by: INTERNAL MEDICINE

## 2023-09-04 NOTE — ED NOTES
Received report from [facility], [facility] assuming care.       Vero Burton RN  09/03/23 5616

## 2023-09-04 NOTE — ED TRIAGE NOTES
Patient c/o high blood pressure readings today with some nausea and SOB. States under a significant amount of stress lately.

## 2023-09-04 NOTE — ED PROVIDER NOTES
were made to edit the dictations but occasionally words are mis-transcribed.)    Mounika Hardin MD (electronically signed)  Attending Emergency Physician           Mounika Hardin MD  09/03/23 1650

## 2023-09-14 ENCOUNTER — TELEPHONE (OUTPATIENT)
Age: 38
End: 2023-09-14

## 2023-09-14 ENCOUNTER — OFFICE VISIT (OUTPATIENT)
Age: 38
End: 2023-09-14
Payer: COMMERCIAL

## 2023-09-14 VITALS
RESPIRATION RATE: 20 BRPM | BODY MASS INDEX: 29.27 KG/M2 | SYSTOLIC BLOOD PRESSURE: 141 MMHG | HEART RATE: 81 BPM | WEIGHT: 204 LBS | OXYGEN SATURATION: 99 % | DIASTOLIC BLOOD PRESSURE: 87 MMHG | TEMPERATURE: 98.4 F

## 2023-09-14 DIAGNOSIS — R94.31 ABNORMAL EKG: Primary | ICD-10-CM

## 2023-09-14 DIAGNOSIS — S16.1XXD STRAIN OF NECK MUSCLE, SUBSEQUENT ENCOUNTER: ICD-10-CM

## 2023-09-14 DIAGNOSIS — I10 PRIMARY HYPERTENSION: Primary | ICD-10-CM

## 2023-09-14 DIAGNOSIS — S29.012D UPPER BACK STRAIN, SUBSEQUENT ENCOUNTER: ICD-10-CM

## 2023-09-14 DIAGNOSIS — I51.7 LVH (LEFT VENTRICULAR HYPERTROPHY): ICD-10-CM

## 2023-09-14 DIAGNOSIS — F90.2 ATTENTION-DEFICIT HYPERACTIVITY DISORDER, COMBINED TYPE: ICD-10-CM

## 2023-09-14 PROCEDURE — 3079F DIAST BP 80-89 MM HG: CPT | Performed by: INTERNAL MEDICINE

## 2023-09-14 PROCEDURE — 3077F SYST BP >= 140 MM HG: CPT | Performed by: INTERNAL MEDICINE

## 2023-09-14 PROCEDURE — 99214 OFFICE O/P EST MOD 30 MIN: CPT | Performed by: INTERNAL MEDICINE

## 2023-09-14 NOTE — TELEPHONE ENCOUNTER
Patient would like to be referred to a cardiologist.  He states he meant to talk to Dr. Alonzo Rodriguez about this at his appointment. Patient states he ws seen recently at the ED for his heart and they told him it is reccommended to see a cardiologist.    Please advise.
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
## 2023-11-30 ENCOUNTER — HOSPITAL ENCOUNTER (EMERGENCY)
Facility: HOSPITAL | Age: 38
Discharge: HOME | End: 2023-11-30
Attending: EMERGENCY MEDICINE
Payer: MEDICAID

## 2023-11-30 ENCOUNTER — TELEPHONE (OUTPATIENT)
Age: 38
End: 2023-11-30

## 2023-11-30 VITALS
BODY MASS INDEX: 31.92 KG/M2 | RESPIRATION RATE: 16 BRPM | HEART RATE: 93 BPM | DIASTOLIC BLOOD PRESSURE: 96 MMHG | HEIGHT: 70 IN | WEIGHT: 223 LBS | SYSTOLIC BLOOD PRESSURE: 149 MMHG | TEMPERATURE: 98.2 F | OXYGEN SATURATION: 99 %

## 2023-11-30 DIAGNOSIS — R03.0 ELEVATED BLOOD PRESSURE READING: ICD-10-CM

## 2023-11-30 DIAGNOSIS — R79.89 ELEVATED SERUM CREATININE: ICD-10-CM

## 2023-11-30 DIAGNOSIS — R07.89 ATYPICAL CHEST PAIN: Primary | ICD-10-CM

## 2023-11-30 LAB
ANION GAP SERPL CALC-SCNC: 5 MMOL/L (ref 3–18)
BASOPHILS # BLD: 0 K/UL (ref 0–0.1)
BASOPHILS NFR BLD: 1 % (ref 0–2)
BUN SERPL-MCNC: 14 MG/DL (ref 7–18)
BUN/CREAT SERPL: 10 (ref 12–20)
CALCIUM SERPL-MCNC: 9.3 MG/DL (ref 8.5–10.1)
CHLORIDE SERPL-SCNC: 101 MMOL/L (ref 100–111)
CO2 SERPL-SCNC: 32 MMOL/L (ref 21–32)
CREAT SERPL-MCNC: 1.42 MG/DL (ref 0.6–1.3)
DIFFERENTIAL METHOD BLD: ABNORMAL
EOSINOPHIL # BLD: 0.1 K/UL (ref 0–0.4)
EOSINOPHIL NFR BLD: 2 % (ref 0–5)
ERYTHROCYTE [DISTWIDTH] IN BLOOD BY AUTOMATED COUNT: 12.9 % (ref 11.6–14.5)
GLUCOSE BLD STRIP.AUTO-MCNC: 181 MG/DL (ref 70–110)
GLUCOSE SERPL-MCNC: 67 MG/DL (ref 74–99)
HCT VFR BLD AUTO: 44.7 % (ref 36–48)
HGB BLD-MCNC: 15.3 G/DL (ref 13–16)
IMM GRANULOCYTES # BLD AUTO: 0 K/UL (ref 0–0.04)
IMM GRANULOCYTES NFR BLD AUTO: 0 % (ref 0–0.5)
LYMPHOCYTES # BLD: 1.8 K/UL (ref 0.9–3.6)
LYMPHOCYTES NFR BLD: 43 % (ref 21–52)
MCH RBC QN AUTO: 31.4 PG (ref 24–34)
MCHC RBC AUTO-ENTMCNC: 34.2 G/DL (ref 31–37)
MCV RBC AUTO: 91.6 FL (ref 78–100)
MONOCYTES # BLD: 0.4 K/UL (ref 0.05–1.2)
MONOCYTES NFR BLD: 9 % (ref 3–10)
NEUTS SEG # BLD: 1.9 K/UL (ref 1.8–8)
NEUTS SEG NFR BLD: 46 % (ref 40–73)
NRBC # BLD: 0 K/UL (ref 0–0.01)
NRBC BLD-RTO: 0 PER 100 WBC
PLATELET # BLD AUTO: 205 K/UL (ref 135–420)
PMV BLD AUTO: 8.8 FL (ref 9.2–11.8)
POTASSIUM SERPL-SCNC: 4.2 MMOL/L (ref 3.5–5.5)
RBC # BLD AUTO: 4.88 M/UL (ref 4.35–5.65)
SODIUM SERPL-SCNC: 138 MMOL/L (ref 136–145)
TROPONIN I SERPL HS-MCNC: 6 NG/L (ref 0–78)
WBC # BLD AUTO: 4.1 K/UL (ref 4.6–13.2)

## 2023-11-30 PROCEDURE — 85025 COMPLETE CBC W/AUTO DIFF WBC: CPT

## 2023-11-30 PROCEDURE — 84484 ASSAY OF TROPONIN QUANT: CPT

## 2023-11-30 PROCEDURE — 80048 BASIC METABOLIC PNL TOTAL CA: CPT

## 2023-11-30 PROCEDURE — 93005 ELECTROCARDIOGRAM TRACING: CPT | Performed by: EMERGENCY MEDICINE

## 2023-11-30 PROCEDURE — 82962 GLUCOSE BLOOD TEST: CPT

## 2023-11-30 PROCEDURE — 99284 EMERGENCY DEPT VISIT MOD MDM: CPT

## 2023-11-30 ASSESSMENT — ENCOUNTER SYMPTOMS
ABDOMINAL PAIN: 0
NAUSEA: 0
SHORTNESS OF BREATH: 0
COUGH: 0
CHEST TIGHTNESS: 0
VOMITING: 0
DIARRHEA: 0

## 2023-11-30 ASSESSMENT — PAIN - FUNCTIONAL ASSESSMENT: PAIN_FUNCTIONAL_ASSESSMENT: NONE - DENIES PAIN

## 2023-11-30 ASSESSMENT — HEART SCORE: ECG: 0

## 2023-11-30 ASSESSMENT — LIFESTYLE VARIABLES
HOW MANY STANDARD DRINKS CONTAINING ALCOHOL DO YOU HAVE ON A TYPICAL DAY: PATIENT DOES NOT DRINK
HOW OFTEN DO YOU HAVE A DRINK CONTAINING ALCOHOL: NEVER

## 2023-11-30 NOTE — ED TRIAGE NOTES
Patient c/o numbness to left arm and leg x1 month. Patient reports having hypertension. 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

## 2023-11-30 NOTE — TELEPHONE ENCOUNTER
----- Message from Chyna Macdonald sent at 11/30/2023 11:01 AM EST -----  Subject: Message to Provider    QUESTIONS  Information for Provider? high blood pressure, numbness left arm and leg,   lightheaded, patient stated while holding on the line he was going to go   to the Emergency room. ---------------------------------------------------------------------------  --------------  Kandy MART  [phone]; OK to leave message on voicemail  ---------------------------------------------------------------------------  --------------  SCRIPT ANSWERS  Relationship to Patient?  Self

## 2023-11-30 NOTE — ED NOTES
Discharge instructions reviewed with patient. Patient verbalized understanding. Patient advised to follow up as directed on discharge instructions. Patient denies questions, needs or concerns at this time. Patient verbalized understanding. No s/sx of distress noted.       Dash diet explained by this RN and Gabriela Newman RN  11/30/23 8633

## 2023-11-30 NOTE — ED PROVIDER NOTES
EMERGENCY DEPARTMENT HISTORY AND PHYSICAL EXAM        Date: 11/30/2023  Patient Name: Satish De La Paz    History of Presenting Illness     Chief Complaint   Patient presents with    Numbness    Hypertension       History Provided By: History obtained from patient    HPI: Satish De La Paz, 41 y.o. male presents to the ED with cc of intermittent chest pain for 30 days    Patient states that he has had episodes of chest pain for the last 30 days. Onset is not related to physical activity, today chest pain began when he was sitting. Location of pain varies from lateral left pectoralis to substernal.  Duration of pain is typically a few minutes. It may go away and returned within a few minutes. There is no radiating of the pain. He has not tried any treatments at home. Severity is 3 out of 10. Reports associated symptom of numbness in his left arm and left leg sometimes. Patient states that he has discontinued Vyvanse 1 month ago. He endorses symptoms of hypervigilance, suddenly awaking with any sound at nighttime with the need to investigate the sound. He states he is under the care of mental health for this, for which there is also a component of posttraumatic stress disorder. Patient endorses other relational stress as well with his wife leaving him recently. Endorses work stress with pending promotion and increase in responsibility. Pmh: Major depressive disorder, shiftwork sleep disorder, obesity,    No nausea, vomiting, diarrhea, fever, chills, shortness of breath, leg swelling    There are no other complaints, changes, or physical findings at this time. Records Reviewed: Primary care note 9/14/2023 blood pressure treated with amlodipine 5 mg, 141/87 in office    PCP: Betzy Rios MD    No current facility-administered medications on file prior to encounter.      Current Outpatient Medications on File Prior to Encounter   Medication Sig Dispense Refill    lisdexamfetamine

## 2023-12-01 ENCOUNTER — OFFICE VISIT (OUTPATIENT)
Age: 38
End: 2023-12-01
Payer: MEDICAID

## 2023-12-01 VITALS
WEIGHT: 228 LBS | BODY MASS INDEX: 32.64 KG/M2 | TEMPERATURE: 98.1 F | HEART RATE: 86 BPM | DIASTOLIC BLOOD PRESSURE: 96 MMHG | HEIGHT: 70 IN | SYSTOLIC BLOOD PRESSURE: 138 MMHG | RESPIRATION RATE: 20 BRPM | OXYGEN SATURATION: 98 %

## 2023-12-01 DIAGNOSIS — F90.2 ATTENTION-DEFICIT HYPERACTIVITY DISORDER, COMBINED TYPE: ICD-10-CM

## 2023-12-01 DIAGNOSIS — I10 PRIMARY HYPERTENSION: Primary | ICD-10-CM

## 2023-12-01 DIAGNOSIS — I10 PRIMARY HYPERTENSION: ICD-10-CM

## 2023-12-01 DIAGNOSIS — R79.89 AZOTEMIA: ICD-10-CM

## 2023-12-01 LAB
EKG ATRIAL RATE: 84 BPM
EKG DIAGNOSIS: NORMAL
EKG P AXIS: 50 DEGREES
EKG P-R INTERVAL: 164 MS
EKG Q-T INTERVAL: 352 MS
EKG QRS DURATION: 108 MS
EKG QTC CALCULATION (BAZETT): 415 MS
EKG R AXIS: 51 DEGREES
EKG T AXIS: 27 DEGREES
EKG VENTRICULAR RATE: 84 BPM

## 2023-12-01 PROCEDURE — 3075F SYST BP GE 130 - 139MM HG: CPT | Performed by: INTERNAL MEDICINE

## 2023-12-01 PROCEDURE — 93010 ELECTROCARDIOGRAM REPORT: CPT | Performed by: INTERNAL MEDICINE

## 2023-12-01 PROCEDURE — 3079F DIAST BP 80-89 MM HG: CPT | Performed by: INTERNAL MEDICINE

## 2023-12-01 PROCEDURE — 99213 OFFICE O/P EST LOW 20 MIN: CPT | Performed by: INTERNAL MEDICINE

## 2023-12-01 RX ORDER — AMLODIPINE BESYLATE 10 MG/1
10 TABLET ORAL DAILY
Qty: 30 TABLET | Refills: 5 | Status: SHIPPED | OUTPATIENT
Start: 2023-12-01

## 2023-12-01 RX ORDER — LISDEXAMFETAMINE DIMESYLATE CAPSULES 50 MG/1
50 CAPSULE ORAL DAILY
Qty: 30 CAPSULE | Refills: 0 | Status: SHIPPED | OUTPATIENT
Start: 2023-12-01 | End: 2023-12-31

## 2023-12-01 NOTE — PROGRESS NOTES
Tanisha Boles presents today for   Chief Complaint   Patient presents with    ED Follow-up      ER      Patient states he was told in the ER his Creatinine was elevated. 1. \"Have you been to the ER, urgent care clinic since your last visit? Hospitalized since your last visit? \" Yes, ER.    2. \"Have you seen or consulted any other health care providers outside of the [address] [place] [address] since your last visit? \" No     3. For patients aged 43-73: Has the patient had a colonoscopy / FIT/ Cologuard? NA - based on age      If the patient is female:    4. For patients aged 43-66: Has the patient had a mammogram within the past 2 years? NA - based on age or sex      11. For patients aged 21-65: Has the patient had a pap smear?  NA - based on age or sex

## 2023-12-04 ENCOUNTER — HOSPITAL ENCOUNTER (OUTPATIENT)
Facility: HOSPITAL | Age: 38
Discharge: HOME | End: 2023-12-07

## 2023-12-04 LAB — SENTARA SPECIMEN COLLECTION: NORMAL

## 2023-12-06 ENCOUNTER — TELEPHONE (OUTPATIENT)
Age: 38
End: 2023-12-06

## 2023-12-06 LAB
A/G RATIO: 1.6 RATIO (ref 1.1–2.6)
ALBUMIN SERPL-MCNC: 4.8 G/DL (ref 3.5–5)
ALP BLD-CCNC: 73 U/L (ref 25–115)
ALT SERPL-CCNC: 31 U/L (ref 5–40)
ANION GAP SERPL CALCULATED.3IONS-SCNC: 4 MMOL/L (ref 3–15)
AST SERPL-CCNC: 25 U/L (ref 10–37)
BILIRUB SERPL-MCNC: 0.3 MG/DL (ref 0.2–1.2)
BUN BLDV-MCNC: 13 MG/DL (ref 6–22)
CALCIUM SERPL-MCNC: 10 MG/DL (ref 8.4–10.5)
CHLORIDE BLD-SCNC: 98 MMOL/L (ref 98–110)
CO2: 35 MMOL/L (ref 20–32)
CREAT SERPL-MCNC: 1.1 MG/DL (ref 0.5–1.2)
GLOBULIN: 3 G/DL (ref 2–4)
GLOMERULAR FILTRATION RATE: >60 ML/MIN/1.73 SQ.M.
GLUCOSE: 101 MG/DL (ref 70–99)
POTASSIUM SERPL-SCNC: 4.2 MMOL/L (ref 3.5–5.5)
SODIUM BLD-SCNC: 137 MMOL/L (ref 133–145)
TOTAL PROTEIN: 7.8 G/DL (ref 6.4–8.3)

## 2023-12-06 NOTE — TELEPHONE ENCOUNTER
----- Message from Portia Ward MD sent at 12/6/2023  7:57 AM EST -----  Please notify patient that their kidney function is back to normal. Continue to stay well hydrated

## 2024-02-21 ENCOUNTER — NURSE ONLY (OUTPATIENT)
Age: 39
End: 2024-02-21

## 2024-02-21 DIAGNOSIS — I10 PRIMARY HYPERTENSION: Primary | ICD-10-CM

## 2024-03-08 ENCOUNTER — APPOINTMENT (OUTPATIENT)
Facility: HOSPITAL | Age: 39
End: 2024-03-08
Payer: MEDICAID

## 2024-03-08 ENCOUNTER — HOSPITAL ENCOUNTER (EMERGENCY)
Facility: HOSPITAL | Age: 39
Discharge: HOME | End: 2024-03-08
Attending: EMERGENCY MEDICINE
Payer: MEDICAID

## 2024-03-08 VITALS
HEART RATE: 62 BPM | RESPIRATION RATE: 17 BRPM | HEIGHT: 70 IN | TEMPERATURE: 97.8 F | OXYGEN SATURATION: 100 % | WEIGHT: 242.8 LBS | SYSTOLIC BLOOD PRESSURE: 154 MMHG | DIASTOLIC BLOOD PRESSURE: 90 MMHG | BODY MASS INDEX: 34.76 KG/M2

## 2024-03-08 DIAGNOSIS — B34.9 ACUTE VIRAL SYNDROME: Primary | ICD-10-CM

## 2024-03-08 LAB
ALBUMIN SERPL-MCNC: 4 G/DL (ref 3.4–5)
ALBUMIN/GLOB SERPL: 1.2 (ref 0.8–1.7)
ALP SERPL-CCNC: 83 U/L (ref 45–117)
ALT SERPL-CCNC: 36 U/L (ref 16–61)
ANION GAP SERPL CALC-SCNC: 6 MMOL/L (ref 3–18)
AST SERPL-CCNC: 19 U/L (ref 10–38)
BASOPHILS # BLD: 0 K/UL (ref 0–0.1)
BASOPHILS NFR BLD: 1 % (ref 0–2)
BILIRUB SERPL-MCNC: 0.2 MG/DL (ref 0.2–1)
BUN SERPL-MCNC: 11 MG/DL (ref 7–18)
BUN/CREAT SERPL: 9 (ref 12–20)
CALCIUM SERPL-MCNC: 9.2 MG/DL (ref 8.5–10.1)
CHLORIDE SERPL-SCNC: 107 MMOL/L (ref 100–111)
CO2 SERPL-SCNC: 28 MMOL/L (ref 21–32)
CREAT SERPL-MCNC: 1.27 MG/DL (ref 0.6–1.3)
DIFFERENTIAL METHOD BLD: ABNORMAL
EKG ATRIAL RATE: 68 BPM
EKG DIAGNOSIS: NORMAL
EKG P AXIS: 24 DEGREES
EKG P-R INTERVAL: 178 MS
EKG Q-T INTERVAL: 378 MS
EKG QRS DURATION: 108 MS
EKG QTC CALCULATION (BAZETT): 401 MS
EKG R AXIS: 58 DEGREES
EKG T AXIS: 26 DEGREES
EKG VENTRICULAR RATE: 68 BPM
EOSINOPHIL # BLD: 0.2 K/UL (ref 0–0.4)
EOSINOPHIL NFR BLD: 3 % (ref 0–5)
ERYTHROCYTE [DISTWIDTH] IN BLOOD BY AUTOMATED COUNT: 13.2 % (ref 11.6–14.5)
FLUAV AG NPH QL IA: NEGATIVE
FLUBV AG NOSE QL IA: NEGATIVE
GLOBULIN SER CALC-MCNC: 3.3 G/DL (ref 2–4)
GLUCOSE SERPL-MCNC: 121 MG/DL (ref 74–99)
HCT VFR BLD AUTO: 39.1 % (ref 36–48)
HGB BLD-MCNC: 13.4 G/DL (ref 13–16)
IMM GRANULOCYTES # BLD AUTO: 0 K/UL (ref 0–0.04)
IMM GRANULOCYTES NFR BLD AUTO: 0 % (ref 0–0.5)
LYMPHOCYTES # BLD: 2.9 K/UL (ref 0.9–3.6)
LYMPHOCYTES NFR BLD: 56 % (ref 21–52)
MCH RBC QN AUTO: 31.5 PG (ref 24–34)
MCHC RBC AUTO-ENTMCNC: 34.3 G/DL (ref 31–37)
MCV RBC AUTO: 92 FL (ref 78–100)
MONOCYTES # BLD: 0.4 K/UL (ref 0.05–1.2)
MONOCYTES NFR BLD: 8 % (ref 3–10)
NEUTS SEG # BLD: 1.7 K/UL (ref 1.8–8)
NEUTS SEG NFR BLD: 32 % (ref 40–73)
NRBC # BLD: 0 K/UL (ref 0–0.01)
NRBC BLD-RTO: 0 PER 100 WBC
PLATELET # BLD AUTO: 224 K/UL (ref 135–420)
PMV BLD AUTO: 8.6 FL (ref 9.2–11.8)
POTASSIUM SERPL-SCNC: 4.1 MMOL/L (ref 3.5–5.5)
PROT SERPL-MCNC: 7.3 G/DL (ref 6.4–8.2)
RBC # BLD AUTO: 4.25 M/UL (ref 4.35–5.65)
SARS-COV-2 RDRP RESP QL NAA+PROBE: NOT DETECTED
SODIUM SERPL-SCNC: 141 MMOL/L (ref 136–145)
SOURCE: NORMAL
TROPONIN I SERPL HS-MCNC: 4 NG/L (ref 0–78)
WBC # BLD AUTO: 5.2 K/UL (ref 4.6–13.2)

## 2024-03-08 PROCEDURE — 99285 EMERGENCY DEPT VISIT HI MDM: CPT

## 2024-03-08 PROCEDURE — 93005 ELECTROCARDIOGRAM TRACING: CPT | Performed by: EMERGENCY MEDICINE

## 2024-03-08 PROCEDURE — 96375 TX/PRO/DX INJ NEW DRUG ADDON: CPT

## 2024-03-08 PROCEDURE — 2580000003 HC RX 258: Performed by: EMERGENCY MEDICINE

## 2024-03-08 PROCEDURE — 96374 THER/PROPH/DIAG INJ IV PUSH: CPT

## 2024-03-08 PROCEDURE — 85025 COMPLETE CBC W/AUTO DIFF WBC: CPT

## 2024-03-08 PROCEDURE — 84484 ASSAY OF TROPONIN QUANT: CPT

## 2024-03-08 PROCEDURE — 71045 X-RAY EXAM CHEST 1 VIEW: CPT

## 2024-03-08 PROCEDURE — 6360000002 HC RX W HCPCS: Performed by: EMERGENCY MEDICINE

## 2024-03-08 PROCEDURE — 87635 SARS-COV-2 COVID-19 AMP PRB: CPT

## 2024-03-08 PROCEDURE — 87804 INFLUENZA ASSAY W/OPTIC: CPT

## 2024-03-08 PROCEDURE — 93010 ELECTROCARDIOGRAM REPORT: CPT | Performed by: INTERNAL MEDICINE

## 2024-03-08 PROCEDURE — 80053 COMPREHEN METABOLIC PANEL: CPT

## 2024-03-08 RX ORDER — IBUPROFEN 600 MG/1
600 TABLET ORAL 3 TIMES DAILY PRN
Qty: 30 TABLET | Refills: 0 | Status: SHIPPED | OUTPATIENT
Start: 2024-03-08

## 2024-03-08 RX ORDER — ONDANSETRON 2 MG/ML
4 INJECTION INTRAMUSCULAR; INTRAVENOUS
Status: COMPLETED | OUTPATIENT
Start: 2024-03-08 | End: 2024-03-08

## 2024-03-08 RX ORDER — KETOROLAC TROMETHAMINE 15 MG/ML
15 INJECTION, SOLUTION INTRAMUSCULAR; INTRAVENOUS
Status: COMPLETED | OUTPATIENT
Start: 2024-03-08 | End: 2024-03-08

## 2024-03-08 RX ORDER — 0.9 % SODIUM CHLORIDE 0.9 %
1000 INTRAVENOUS SOLUTION INTRAVENOUS ONCE
Status: COMPLETED | OUTPATIENT
Start: 2024-03-08 | End: 2024-03-08

## 2024-03-08 RX ADMIN — SODIUM CHLORIDE 1000 ML: 9 INJECTION, SOLUTION INTRAVENOUS at 03:40

## 2024-03-08 RX ADMIN — KETOROLAC TROMETHAMINE 15 MG: 15 INJECTION, SOLUTION INTRAMUSCULAR; INTRAVENOUS at 03:46

## 2024-03-08 RX ADMIN — ONDANSETRON 4 MG: 2 INJECTION INTRAMUSCULAR; INTRAVENOUS at 03:47

## 2024-03-08 ASSESSMENT — ENCOUNTER SYMPTOMS
SORE THROAT: 0
SHORTNESS OF BREATH: 0
TROUBLE SWALLOWING: 0
NAUSEA: 0

## 2024-03-08 ASSESSMENT — PAIN SCALES - GENERAL: PAINLEVEL_OUTOF10: 4

## 2024-03-08 ASSESSMENT — PAIN - FUNCTIONAL ASSESSMENT: PAIN_FUNCTIONAL_ASSESSMENT: 0-10

## 2024-03-08 ASSESSMENT — PAIN DESCRIPTION - LOCATION: LOCATION: CHEST

## 2024-03-08 ASSESSMENT — PAIN DESCRIPTION - DESCRIPTORS: DESCRIPTORS: ACHING

## 2024-03-08 ASSESSMENT — PAIN DESCRIPTION - FREQUENCY: FREQUENCY: INTERMITTENT

## 2024-03-08 NOTE — ED PROVIDER NOTES
`[facility] EMERGENCY DEPT  eMERGENCY dEPARTMENT eNCOUnter      Pt Name: Rodri Mercado  MRN: [id number]  Birthdate 1985 of evaluation: 3/8/2024  Provider:Giovanni Whitley MD    CHIEF COMPLAINT         HPI    Rodri Mercado is a 38 y.o. male  c/o having generalize muscle aches and pains, fever, chills, chest pain x 2 to 3 days.    ROS  Review of Systems   HENT:  Negative for sore throat and trouble swallowing.    Respiratory:  Negative for shortness of breath.    Cardiovascular:  Negative for chest pain.   Gastrointestinal:  Negative for nausea.   Musculoskeletal:  Positive for myalgias.   All other systems reviewed and are negative.      Except as noted above the remainder of the review of systems was reviewed and negative.       PAST MEDICAL HISTORY     Past Medical History:   Diagnosis Date    Anemia     Condyloma acuminatum     Environmental allergies     Groin pain     Hypertension     Obstructive sleep apnea on CPAP     BRADY (obstructive sleep apnea)     Plantar fasciitis     Sciatica     Sinusitis     Tinea pedis          SURGICAL HISTORY       Past Surgical History:   Procedure Laterality Date    OTHER SURGICAL HISTORY      dental         CURRENTMEDICATIONS       Previous Medications    AMLODIPINE (NORVASC) 10 MG TABLET    Take 1 tablet by mouth daily    DOCOSANOL (ABREVA) 10 % CREA CREAM    APPLY TO AFFECTED AREA 5 TIMES PER DAY UNTIL HEALED.    FLUTICASONE (FLONASE) 50 MCG/ACT NASAL SPRAY    2 sprays by Nasal route daily as needed    IBUPROFEN (ADVIL;MOTRIN) 800 MG TABLET    Take 1 tablet by mouth every 8 hours as needed    LEVOCETIRIZINE (XYZAL) 5 MG TABLET    Take 1 tablet by mouth nightly    LISDEXAMFETAMINE (VYVANSE) 50 MG CAPSULE    Take 1 capsule by mouth daily for 30 days. Max Daily Amount: 50 mg    MONTELUKAST (SINGULAIR) 10 MG TABLET    Take 1 tablet by mouth daily       ALLERGIES     Penicillins, Molds & smuts, and Peanut oil    FAMILY HISTORY       Family History   Problem

## 2024-03-08 NOTE — ED NOTES
Discharge teaching provided to patient regarding treatment received,medications prescribed, and proper follow-up care. Patient verbalized understanding directions and follow up care. Patient left ambulatory with discharge paperwork in hand.

## 2024-03-08 NOTE — ED TRIAGE NOTES
Patient presents with c/o cough, sore throat, nausea, and diarrhea x 3 days. States today he developed chest pain. Has been seen for these symptoms and swabbed Covid and flu negative

## 2024-03-20 ENCOUNTER — HOSPITAL ENCOUNTER (EMERGENCY)
Facility: HOSPITAL | Age: 39
Discharge: HOME | End: 2024-03-21
Attending: EMERGENCY MEDICINE
Payer: MEDICAID

## 2024-03-20 VITALS
HEIGHT: 70 IN | SYSTOLIC BLOOD PRESSURE: 136 MMHG | BODY MASS INDEX: 34.36 KG/M2 | TEMPERATURE: 99 F | RESPIRATION RATE: 18 BRPM | HEART RATE: 95 BPM | DIASTOLIC BLOOD PRESSURE: 83 MMHG | OXYGEN SATURATION: 99 % | WEIGHT: 240 LBS

## 2024-03-20 DIAGNOSIS — B34.9 ACUTE VIRAL SYNDROME: Primary | ICD-10-CM

## 2024-03-20 PROCEDURE — 96374 THER/PROPH/DIAG INJ IV PUSH: CPT

## 2024-03-20 PROCEDURE — 99284 EMERGENCY DEPT VISIT MOD MDM: CPT

## 2024-03-20 PROCEDURE — 87804 INFLUENZA ASSAY W/OPTIC: CPT

## 2024-03-20 PROCEDURE — 87635 SARS-COV-2 COVID-19 AMP PRB: CPT

## 2024-03-20 PROCEDURE — 96361 HYDRATE IV INFUSION ADD-ON: CPT

## 2024-03-20 RX ORDER — 0.9 % SODIUM CHLORIDE 0.9 %
1000 INTRAVENOUS SOLUTION INTRAVENOUS ONCE
Status: COMPLETED | OUTPATIENT
Start: 2024-03-21 | End: 2024-03-21

## 2024-03-20 RX ORDER — ONDANSETRON 2 MG/ML
4 INJECTION INTRAMUSCULAR; INTRAVENOUS
Status: COMPLETED | OUTPATIENT
Start: 2024-03-21 | End: 2024-03-21

## 2024-03-20 ASSESSMENT — PAIN SCALES - GENERAL: PAINLEVEL_OUTOF10: 0

## 2024-03-20 ASSESSMENT — ENCOUNTER SYMPTOMS
DIARRHEA: 1
RESPIRATORY NEGATIVE: 1
VOMITING: 1
NAUSEA: 1

## 2024-03-20 ASSESSMENT — PAIN - FUNCTIONAL ASSESSMENT: PAIN_FUNCTIONAL_ASSESSMENT: NONE - DENIES PAIN

## 2024-03-21 LAB
FLUAV AG NPH QL IA: NEGATIVE
FLUBV AG NOSE QL IA: NEGATIVE
SARS-COV-2 RDRP RESP QL NAA+PROBE: NOT DETECTED
SOURCE: NORMAL

## 2024-03-21 PROCEDURE — 6370000000 HC RX 637 (ALT 250 FOR IP): Performed by: EMERGENCY MEDICINE

## 2024-03-21 PROCEDURE — 96374 THER/PROPH/DIAG INJ IV PUSH: CPT

## 2024-03-21 PROCEDURE — 96361 HYDRATE IV INFUSION ADD-ON: CPT

## 2024-03-21 PROCEDURE — 2580000003 HC RX 258: Performed by: EMERGENCY MEDICINE

## 2024-03-21 PROCEDURE — 6360000002 HC RX W HCPCS: Performed by: EMERGENCY MEDICINE

## 2024-03-21 RX ORDER — IBUPROFEN 400 MG/1
800 TABLET ORAL
Status: COMPLETED | OUTPATIENT
Start: 2024-03-21 | End: 2024-03-21

## 2024-03-21 RX ORDER — ONDANSETRON 4 MG/1
4 TABLET, FILM COATED ORAL 3 TIMES DAILY PRN
Qty: 10 TABLET | Refills: 0 | Status: SHIPPED | OUTPATIENT
Start: 2024-03-21

## 2024-03-21 RX ORDER — IBUPROFEN 600 MG/1
600 TABLET ORAL 3 TIMES DAILY PRN
Qty: 30 TABLET | Refills: 0 | Status: SHIPPED | OUTPATIENT
Start: 2024-03-21

## 2024-03-21 RX ADMIN — SODIUM CHLORIDE 1000 ML: 9 INJECTION, SOLUTION INTRAVENOUS at 00:00

## 2024-03-21 RX ADMIN — IBUPROFEN 800 MG: 400 TABLET, FILM COATED ORAL at 01:17

## 2024-03-21 RX ADMIN — ONDANSETRON 4 MG: 2 INJECTION INTRAMUSCULAR; INTRAVENOUS at 00:07

## 2024-03-21 ASSESSMENT — PAIN DESCRIPTION - LOCATION: LOCATION: GENERALIZED

## 2024-03-21 ASSESSMENT — PAIN SCALES - GENERAL: PAINLEVEL_OUTOF10: 4

## 2024-03-21 NOTE — ED NOTES
Patient discharged at this time. This RN reviewed discharge instructions at this time with the patient.  patient verbalized understanding and does not have any questions. Pt ambulatory upon discharge, & in stable condition. Pt armband removed & shredded. Patient I.V was discharged.

## 2024-03-21 NOTE — ED NOTES
Administered medication and educated patient on side effects. Patient allergies verified. All questions and concerns answered. Pt instructed to use call bell at the bedside if needed. This nurse will continues to monitor pt at this time. Side rails up.

## 2024-03-21 NOTE — ED PROVIDER NOTES
Pulse: 95   Resp: 18   Temp: 99 °F (37.2 °C)   TempSrc: Oral   SpO2: 99%   Weight: 108.9 kg (240 lb)   Height: 1.778 m (5' 10\")       MDM  Differential diagnosis: Viral syndrome, URI, flu COVID upper respiratory syndrome    No orders to display         11:49 PM  Upon re-evaluation the patient's symptoms have improved. Pt has non-toxic appearance and condition is stable for discharge. Patient was informed of tests & results, instructed to f/u with PCP and return to the ED upon worsening of symptoms. All questions and concerns were addressed.       Procedures    FINAL IMPRESSION      Viral syndrome      DISPOSITION/PLAN       DISPOSITION  D/C home    DISCHARGE MEDICATIONS:    Motrin    Zofran       PATIENT REFERRED TO:    PCP in 3 days.  Return to ER prn.    (Please note that portions of this note were completed with a voice recognitionprogram.  Efforts were made to edit the dictations but occasionally words are mis-transcribed.)    Giovanni Whitley MD(electronically signed)  Attending Emergency Physician          Giovanni Whitley MD  03/22/24 0793

## 2024-03-21 NOTE — ED TRIAGE NOTES
A&O male with c/o n/v/d, body aches and chills that began yesterday evening. Reports slight improvement in symptoms today. Needs work note due to being sent home from work.

## 2024-09-28 ENCOUNTER — HOSPITAL ENCOUNTER (EMERGENCY)
Facility: HOSPITAL | Age: 39
Discharge: HOME | End: 2024-09-28
Attending: EMERGENCY MEDICINE
Payer: MEDICAID

## 2024-09-28 VITALS
WEIGHT: 250 LBS | HEART RATE: 78 BPM | OXYGEN SATURATION: 99 % | DIASTOLIC BLOOD PRESSURE: 86 MMHG | RESPIRATION RATE: 18 BRPM | SYSTOLIC BLOOD PRESSURE: 147 MMHG | HEIGHT: 70 IN | BODY MASS INDEX: 35.79 KG/M2 | TEMPERATURE: 97 F

## 2024-09-28 DIAGNOSIS — R10.13 ABDOMINAL PAIN, EPIGASTRIC: Primary | ICD-10-CM

## 2024-09-28 DIAGNOSIS — R03.0 ELEVATED BLOOD PRESSURE READING: ICD-10-CM

## 2024-09-28 LAB
ALBUMIN SERPL-MCNC: 3.7 G/DL (ref 3.4–5)
ALBUMIN/GLOB SERPL: 0.9 (ref 0.8–1.7)
ALP SERPL-CCNC: 73 U/L (ref 45–117)
ALT SERPL-CCNC: 34 U/L (ref 16–61)
ANION GAP SERPL CALC-SCNC: 6 MMOL/L (ref 3–18)
AST SERPL-CCNC: 22 U/L (ref 10–38)
BASOPHILS # BLD: 0 K/UL (ref 0–0.1)
BASOPHILS NFR BLD: 1 % (ref 0–2)
BILIRUB SERPL-MCNC: 0.2 MG/DL (ref 0.2–1)
BUN SERPL-MCNC: 11 MG/DL (ref 7–18)
BUN/CREAT SERPL: 8 (ref 12–20)
CALCIUM SERPL-MCNC: 8.8 MG/DL (ref 8.5–10.1)
CHLORIDE SERPL-SCNC: 105 MMOL/L (ref 100–111)
CO2 SERPL-SCNC: 28 MMOL/L (ref 21–32)
CREAT SERPL-MCNC: 1.32 MG/DL (ref 0.6–1.3)
DIFFERENTIAL METHOD BLD: ABNORMAL
EOSINOPHIL # BLD: 0.1 K/UL (ref 0–0.4)
EOSINOPHIL NFR BLD: 2 % (ref 0–5)
ERYTHROCYTE [DISTWIDTH] IN BLOOD BY AUTOMATED COUNT: 12.9 % (ref 11.6–14.5)
GLOBULIN SER CALC-MCNC: 3.9 G/DL (ref 2–4)
GLUCOSE SERPL-MCNC: 114 MG/DL (ref 74–99)
HCT VFR BLD AUTO: 40.3 % (ref 36–48)
HGB BLD-MCNC: 13.7 G/DL (ref 13–16)
IMM GRANULOCYTES # BLD AUTO: 0 K/UL (ref 0–0.04)
IMM GRANULOCYTES NFR BLD AUTO: 0 % (ref 0–0.5)
LIPASE SERPL-CCNC: 40 U/L (ref 13–75)
LYMPHOCYTES # BLD: 2 K/UL (ref 0.9–3.6)
LYMPHOCYTES NFR BLD: 47 % (ref 21–52)
MCH RBC QN AUTO: 30.4 PG (ref 24–34)
MCHC RBC AUTO-ENTMCNC: 34 G/DL (ref 31–37)
MCV RBC AUTO: 89.6 FL (ref 78–100)
MONOCYTES # BLD: 0.4 K/UL (ref 0.05–1.2)
MONOCYTES NFR BLD: 9 % (ref 3–10)
NEUTS SEG # BLD: 1.7 K/UL (ref 1.8–8)
NEUTS SEG NFR BLD: 42 % (ref 40–73)
NRBC # BLD: 0 K/UL (ref 0–0.01)
NRBC BLD-RTO: 0 PER 100 WBC
PLATELET # BLD AUTO: 214 K/UL (ref 135–420)
PMV BLD AUTO: 9 FL (ref 9.2–11.8)
POTASSIUM SERPL-SCNC: 4.1 MMOL/L (ref 3.5–5.5)
PROT SERPL-MCNC: 7.6 G/DL (ref 6.4–8.2)
RBC # BLD AUTO: 4.5 M/UL (ref 4.35–5.65)
SODIUM SERPL-SCNC: 139 MMOL/L (ref 136–145)
WBC # BLD AUTO: 4.2 K/UL (ref 4.6–13.2)

## 2024-09-28 PROCEDURE — 96374 THER/PROPH/DIAG INJ IV PUSH: CPT

## 2024-09-28 PROCEDURE — 99284 EMERGENCY DEPT VISIT MOD MDM: CPT

## 2024-09-28 PROCEDURE — 80053 COMPREHEN METABOLIC PANEL: CPT

## 2024-09-28 PROCEDURE — 85025 COMPLETE CBC W/AUTO DIFF WBC: CPT

## 2024-09-28 PROCEDURE — 6360000002 HC RX W HCPCS: Performed by: EMERGENCY MEDICINE

## 2024-09-28 PROCEDURE — 83690 ASSAY OF LIPASE: CPT

## 2024-09-28 PROCEDURE — 2500000003 HC RX 250 WO HCPCS: Performed by: EMERGENCY MEDICINE

## 2024-09-28 PROCEDURE — 2580000003 HC RX 258: Performed by: EMERGENCY MEDICINE

## 2024-09-28 PROCEDURE — 6370000000 HC RX 637 (ALT 250 FOR IP): Performed by: EMERGENCY MEDICINE

## 2024-09-28 PROCEDURE — 96375 TX/PRO/DX INJ NEW DRUG ADDON: CPT

## 2024-09-28 RX ORDER — AMLODIPINE BESYLATE 5 MG/1
5 TABLET ORAL
Status: COMPLETED | OUTPATIENT
Start: 2024-09-28 | End: 2024-09-28

## 2024-09-28 RX ORDER — 0.9 % SODIUM CHLORIDE 0.9 %
1000 INTRAVENOUS SOLUTION INTRAVENOUS ONCE
Status: COMPLETED | OUTPATIENT
Start: 2024-09-28 | End: 2024-09-28

## 2024-09-28 RX ORDER — ONDANSETRON 2 MG/ML
4 INJECTION INTRAMUSCULAR; INTRAVENOUS
Status: COMPLETED | OUTPATIENT
Start: 2024-09-28 | End: 2024-09-28

## 2024-09-28 RX ORDER — ONDANSETRON 8 MG/1
8 TABLET, ORALLY DISINTEGRATING ORAL EVERY 8 HOURS PRN
Qty: 10 TABLET | Refills: 0 | Status: SHIPPED | OUTPATIENT
Start: 2024-09-28

## 2024-09-28 RX ORDER — AMLODIPINE BESYLATE 5 MG/1
5 TABLET ORAL DAILY
Qty: 14 TABLET | Refills: 0 | Status: SHIPPED | OUTPATIENT
Start: 2024-09-28

## 2024-09-28 RX ORDER — FAMOTIDINE 20 MG/1
20 TABLET, FILM COATED ORAL 2 TIMES DAILY
Qty: 6 TABLET | Refills: 0 | Status: SHIPPED | OUTPATIENT
Start: 2024-09-28 | End: 2024-10-01

## 2024-09-28 RX ORDER — BUPROPION HYDROCHLORIDE 150 MG/1
1 TABLET ORAL EVERY MORNING
COMMUNITY
Start: 2024-08-23 | End: 2024-11-21

## 2024-09-28 RX ADMIN — AMLODIPINE BESYLATE 5 MG: 5 TABLET ORAL at 08:17

## 2024-09-28 RX ADMIN — ONDANSETRON 4 MG: 2 INJECTION, SOLUTION INTRAMUSCULAR; INTRAVENOUS at 07:34

## 2024-09-28 RX ADMIN — FAMOTIDINE 20 MG: 10 INJECTION, SOLUTION INTRAVENOUS at 07:34

## 2024-09-28 RX ADMIN — SODIUM CHLORIDE 1000 ML: 9 INJECTION, SOLUTION INTRAVENOUS at 07:32

## 2024-09-28 RX ADMIN — ALUMINUM HYDROXIDE, MAGNESIUM HYDROXIDE, AND SIMETHICONE 40 ML: 1200; 120; 1200 SUSPENSION ORAL at 07:34

## 2024-09-28 ASSESSMENT — ENCOUNTER SYMPTOMS
SORE THROAT: 1
EYES NEGATIVE: 1
ABDOMINAL DISTENTION: 1
CHEST TIGHTNESS: 0
NAUSEA: 1
ABDOMINAL PAIN: 0

## 2024-09-28 ASSESSMENT — PAIN SCALES - GENERAL: PAINLEVEL_OUTOF10: 4

## 2024-09-28 ASSESSMENT — PAIN - FUNCTIONAL ASSESSMENT: PAIN_FUNCTIONAL_ASSESSMENT: 0-10

## 2024-09-28 NOTE — ED NOTES
Pt to ED reports left sided headache, abdominal pain, nausea onset last night ,pt also reports having feeling of foreign body/ food bolus in throat after eating chicken from [employer] that has been in fridge for a few days. Pt arrived ambulatory , difficulty breathing, able to speak in full sentences, maintaining saliva, NAD respiratory distress noted.  Pt denies vomiting, CP, SOB. Pt placed and SPO2 and BP cuff . Pt also endorses buttock/muscular pain  x several weeks. Pt denies injury  pt reports BM this am, noted to be  normal

## 2024-09-28 NOTE — DISCHARGE INSTRUCTIONS
Keep yourself well-hydrated, have given you a 2-week supply of your blood pressure medication to discuss with your primary doctor, take nausea medication as needed, and take the Pepcid for the next 3 days.  Please return if you are at all worsened or concerned.

## 2024-09-28 NOTE — ED TRIAGE NOTES
A&O male with c/o HA and fatigue that began last night. Has globulus sensation in throat and nausea after eating some chicken last night. Also c/o muscular pain in upper buttocks for 3-4 weeks.

## 2024-09-28 NOTE — ED PROVIDER NOTES
alert and oriented to person, place, and time.      Comments: Symmetric strength, speech clear, no focal findings   Psychiatric:         Mood and Affect: Mood normal.         Behavior: Behavior normal.      Comments: Good insight to his care           Diagnostic Study Results     Labs -  No results found for this or any previous visit (from the past 12 hour(s)).    Radiologic Studies -   No orders to display         Medical Decision Making   I am the first provider for this patient.    I reviewed the vital signs, available nursing notes, past medical history, past surgical history, family history and social history.    Vital Signs-Reviewed the patient's vital signs.        ED Course: Progress Notes, Reevaluation, and Consults:    Provider Notes (Medical Decision Making):     MDM  Number of Diagnoses or Management Options  Abdominal pain, epigastric  Elevated blood pressure reading  Diagnosis management comments: Patient is a 39-year-old male with a history of hypertension, sleep apnea, depression, the presents emergency department with some sore throat, headache, epigastric pain, not feeling well since eating some old chicken and the further.  Patient said did not taste right and was feeling bad since and could go to work this morning.  Patient has a soft abdomen except for some mild epigastric tenderness, complains of pain with swallowing his throat is without erythema states tolerating liquids well she do not suspect esophageal and food impaction.  Likely has food related illness likely from the old chicken and will start IV fluids, follow his abdominal labs, hydrate, supportive care with Zofran, Pepcid, GI cocktail, and then reevaluate.  Patient's blood pressure is elevated however has not been taking his amlodipine 10 mg and has a plan to see the PCP on Monday.         Patient is feeling much better, tolerating p.o., headache is resolved, and the patient was given a 5 mg dose of Norvasc.  Patient was on Norvasc  me or call our department.       Denny Brown MD  09/29/24 [id number]

## 2025-07-28 ENCOUNTER — HOSPITAL ENCOUNTER (EMERGENCY)
Age: 40
Discharge: HOME | End: 2025-07-28
Payer: COMMERCIAL

## 2025-07-28 ENCOUNTER — APPOINTMENT (OUTPATIENT)
Age: 40
End: 2025-07-28
Payer: COMMERCIAL

## 2025-07-28 VITALS
OXYGEN SATURATION: 100 % | RESPIRATION RATE: 18 BRPM | BODY MASS INDEX: 41.47 KG/M2 | DIASTOLIC BLOOD PRESSURE: 96 MMHG | SYSTOLIC BLOOD PRESSURE: 162 MMHG | HEART RATE: 97 BPM | TEMPERATURE: 100.2 F | WEIGHT: 280 LBS | HEIGHT: 69 IN

## 2025-07-28 DIAGNOSIS — S93.401A SPRAIN OF RIGHT ANKLE, UNSPECIFIED LIGAMENT, INITIAL ENCOUNTER: Primary | ICD-10-CM

## 2025-07-28 DIAGNOSIS — M25.571 ACUTE RIGHT ANKLE PAIN: ICD-10-CM

## 2025-07-28 DIAGNOSIS — R52 BODY ACHES: ICD-10-CM

## 2025-07-28 DIAGNOSIS — R50.9 FEVER, UNSPECIFIED FEVER CAUSE: ICD-10-CM

## 2025-07-28 DIAGNOSIS — J02.0 STREP PHARYNGITIS: ICD-10-CM

## 2025-07-28 LAB
FLUAV RNA SPEC QL NAA+PROBE: NOT DETECTED
FLUBV RNA SPEC QL NAA+PROBE: NOT DETECTED
S PYO DNA THROAT QL NAA+PROBE: NOT DETECTED
SARS-COV-2 RNA RESP QL NAA+PROBE: NOT DETECTED
SOURCE: NORMAL

## 2025-07-28 PROCEDURE — 99284 EMERGENCY DEPT VISIT MOD MDM: CPT

## 2025-07-28 PROCEDURE — 6370000000 HC RX 637 (ALT 250 FOR IP): Performed by: EMERGENCY MEDICINE

## 2025-07-28 PROCEDURE — 87636 SARSCOV2 & INF A&B AMP PRB: CPT

## 2025-07-28 PROCEDURE — 6360000002 HC RX W HCPCS

## 2025-07-28 PROCEDURE — 73630 X-RAY EXAM OF FOOT: CPT

## 2025-07-28 PROCEDURE — 96374 THER/PROPH/DIAG INJ IV PUSH: CPT

## 2025-07-28 PROCEDURE — 29515 APPLICATION SHORT LEG SPLINT: CPT

## 2025-07-28 PROCEDURE — 73610 X-RAY EXAM OF ANKLE: CPT

## 2025-07-28 PROCEDURE — 87651 STREP A DNA AMP PROBE: CPT

## 2025-07-28 RX ORDER — ACETAMINOPHEN 500 MG
1000 TABLET ORAL
Status: COMPLETED | OUTPATIENT
Start: 2025-07-28 | End: 2025-07-28

## 2025-07-28 RX ORDER — KETOROLAC TROMETHAMINE 15 MG/ML
15 INJECTION, SOLUTION INTRAMUSCULAR; INTRAVENOUS
Status: COMPLETED | OUTPATIENT
Start: 2025-07-28 | End: 2025-07-28

## 2025-07-28 RX ORDER — CLINDAMYCIN HYDROCHLORIDE 300 MG/1
300 CAPSULE ORAL 3 TIMES DAILY
Qty: 30 CAPSULE | Refills: 0 | Status: SHIPPED | OUTPATIENT
Start: 2025-07-28 | End: 2025-08-07

## 2025-07-28 RX ORDER — IBUPROFEN 600 MG/1
600 TABLET, FILM COATED ORAL EVERY 6 HOURS PRN
Qty: 20 TABLET | Refills: 0 | Status: SHIPPED | OUTPATIENT
Start: 2025-07-28 | End: 2025-08-02

## 2025-07-28 RX ADMIN — ACETAMINOPHEN 1000 MG: 500 TABLET ORAL at 12:50

## 2025-07-28 RX ADMIN — KETOROLAC TROMETHAMINE 15 MG: 15 INJECTION, SOLUTION INTRAMUSCULAR; INTRAVENOUS at 14:20

## 2025-07-28 ASSESSMENT — ENCOUNTER SYMPTOMS
EYES NEGATIVE: 1
SORE THROAT: 1
GASTROINTESTINAL NEGATIVE: 1
RESPIRATORY NEGATIVE: 1

## 2025-07-28 ASSESSMENT — PAIN SCALES - GENERAL: PAINLEVEL_OUTOF10: 7

## 2025-07-28 ASSESSMENT — PAIN - FUNCTIONAL ASSESSMENT: PAIN_FUNCTIONAL_ASSESSMENT: 0-10

## 2025-07-28 NOTE — ED TRIAGE NOTES
Pt c/o sore throat, aching behind eyes, since the weekend. Hurting in joints and shoulders for months. Twisted his right ankle some time ago and injured his right 2nd toe. Today he has been having chills and feels weak. Decided to come in today to be seen

## 2025-07-28 NOTE — DISCHARGE INSTRUCTIONS
You presented to the emergency department for evaluation of strep pharyngitis, ankle sprain.  We discussed all of diagnostic studies.  Recommend outpatient follow-up with PCP, orthopedic surgery for repeat evaluation.  We also discussed rest ice elevation compression of ankle.  She was planning a brace today.  I have also initiated antibiotic therapy for your strep pharyngitis.  Take Tylenol Motrin every 6 hours as needed.  May alternate between the medications or take them as recommended on previous bottle.  Return tomorrow for fever free for 24 hours on antibiotic for 24 hours

## 2025-07-28 NOTE — ED PROVIDER NOTES
[facility] EMERGENCY DEPARTMENT  EMERGENCY DEPARTMENT ENCOUNTER        Pt Name: Rodri Mercado  MRN: [id number]  Birthdate 1985  Date of evaluation: 7/28/2025  Provider: Katherine Lai PA-C  PCP: No primary care provider on file.  Note Started: 2:11 PM EDT 7/28/25      JULIÁN. I have evaluated this patient.        CHIEF COMPLAINT       Chief Complaint   Patient presents with    Foot Pain       HISTORY OF PRESENT ILLNESS: 1 or more Elements     History From: Patient             Chief Complaint: Sore throat, body aches, right ankle pain     Rodri Mercado is a 39 y.o. male who presents to the emergency department complaints of generalized bodyaches, sore throat for the past 1 day.  He states he has a fever last night.  He also states 1 week ago he rolled his right ankle while carrying his daughter down the stairs he had an inversion ankle injury.  He endorses pain and swelling that is worse with ambulation.  He states that he has not any nausea vomiting diarrhea.    Nursing Notes were all reviewed and agreed with or any disagreements were addressed in the HPI.    REVIEW OF SYSTEMS :      Review of Systems   Constitutional: Negative.    HENT:  Positive for congestion and sore throat.    Eyes: Negative.    Respiratory: Negative.     Cardiovascular: Negative.    Gastrointestinal: Negative.    Endocrine: Negative.    Genitourinary: Negative.    Musculoskeletal:  Positive for arthralgias.   Neurological: Negative.    Psychiatric/Behavioral: Negative.         Positives and Pertinent negatives as per HPI.     SURGICAL HISTORY     Past Surgical History:   Procedure Laterality Date    OTHER SURGICAL HISTORY      dental       CURRENTMEDICATIONS       Discharge Medication List as of 7/28/2025  2:17 PM        CONTINUE these medications which have NOT CHANGED    Details   buPROPion (WELLBUTRIN XL) 150 MG extended release tablet Take 1 tablet by mouth every morningHistorical Med

## 2025-07-28 NOTE — ED NOTES
Aircast  to right ankle applied to right ankle.      Pt d/c home with instructions and follow up care.